# Patient Record
Sex: FEMALE | Race: WHITE | NOT HISPANIC OR LATINO | Employment: OTHER | URBAN - METROPOLITAN AREA
[De-identification: names, ages, dates, MRNs, and addresses within clinical notes are randomized per-mention and may not be internally consistent; named-entity substitution may affect disease eponyms.]

---

## 2022-05-19 ENCOUNTER — OFFICE VISIT (OUTPATIENT)
Dept: ONCOLOGY | Facility: CLINIC | Age: 82
End: 2022-05-19
Payer: MEDICARE

## 2022-05-19 ENCOUNTER — OFFICE VISIT (OUTPATIENT)
Dept: ONCOLOGY | Facility: CLINIC | Age: 82
End: 2022-05-19

## 2022-05-19 VITALS
HEIGHT: 66 IN | DIASTOLIC BLOOD PRESSURE: 63 MMHG | HEART RATE: 80 BPM | HEART RATE: 80 BPM | TEMPERATURE: 99 F | TEMPERATURE: 99 F | SYSTOLIC BLOOD PRESSURE: 135 MMHG | RESPIRATION RATE: 18 BRPM | TEMPERATURE: 99 F | DIASTOLIC BLOOD PRESSURE: 63 MMHG | DIASTOLIC BLOOD PRESSURE: 63 MMHG | BODY MASS INDEX: 27.8 KG/M2 | HEIGHT: 66 IN | SYSTOLIC BLOOD PRESSURE: 135 MMHG | WEIGHT: 173 LBS | BODY MASS INDEX: 27.8 KG/M2 | SYSTOLIC BLOOD PRESSURE: 135 MMHG | WEIGHT: 173 LBS | HEART RATE: 80 BPM | RESPIRATION RATE: 18 BRPM | RESPIRATION RATE: 18 BRPM | BODY MASS INDEX: 27.8 KG/M2 | HEIGHT: 66 IN | WEIGHT: 173 LBS

## 2022-05-19 DIAGNOSIS — M79.621 PAIN OF RIGHT UPPER ARM: ICD-10-CM

## 2022-05-19 DIAGNOSIS — Z17.1 MALIGNANT NEOPLASM OF CENTRAL PORTION OF LEFT BREAST IN FEMALE, ESTROGEN RECEPTOR NEGATIVE: Primary | ICD-10-CM

## 2022-05-19 DIAGNOSIS — Z17.1 MALIGNANT NEOPLASM OF OVERLAPPING SITES OF LEFT BREAST IN FEMALE, ESTROGEN RECEPTOR NEGATIVE: ICD-10-CM

## 2022-05-19 DIAGNOSIS — C50.112 MALIGNANT NEOPLASM OF CENTRAL PORTION OF LEFT BREAST IN FEMALE, ESTROGEN RECEPTOR NEGATIVE: Primary | ICD-10-CM

## 2022-05-19 DIAGNOSIS — C50.812 MALIGNANT NEOPLASM OF OVERLAPPING SITES OF LEFT BREAST IN FEMALE, ESTROGEN RECEPTOR NEGATIVE: ICD-10-CM

## 2022-05-19 DIAGNOSIS — M25.511 CHRONIC RIGHT SHOULDER PAIN: Primary | ICD-10-CM

## 2022-05-19 DIAGNOSIS — M25.611 DECREASED RANGE OF MOTION OF RIGHT SHOULDER: ICD-10-CM

## 2022-05-19 DIAGNOSIS — G89.29 CHRONIC RIGHT SHOULDER PAIN: Primary | ICD-10-CM

## 2022-05-19 PROCEDURE — 99205 OFFICE O/P NEW HI 60 MIN: CPT | Mod: S$GLB,,, | Performed by: RADIOLOGY

## 2022-05-19 PROCEDURE — 3078F DIAST BP <80 MM HG: CPT | Mod: CPTII,S$GLB,, | Performed by: RADIOLOGY

## 2022-05-19 PROCEDURE — 1160F RVW MEDS BY RX/DR IN RCRD: CPT | Mod: CPTII,,, | Performed by: SURGERY

## 2022-05-19 PROCEDURE — 3075F PR MOST RECENT SYSTOLIC BLOOD PRESS GE 130-139MM HG: ICD-10-PCS | Mod: CPTII,,, | Performed by: SURGERY

## 2022-05-19 PROCEDURE — 3288F FALL RISK ASSESSMENT DOCD: CPT | Mod: CPTII,S$GLB,, | Performed by: INTERNAL MEDICINE

## 2022-05-19 PROCEDURE — 99205 PR OFFICE/OUTPT VISIT, NEW, LEVL V, 60-74 MIN: ICD-10-PCS | Mod: S$GLB,,, | Performed by: RADIOLOGY

## 2022-05-19 PROCEDURE — 3288F PR FALLS RISK ASSESSMENT DOCUMENTED: ICD-10-PCS | Mod: CPTII,,, | Performed by: SURGERY

## 2022-05-19 PROCEDURE — 1159F PR MEDICATION LIST DOCUMENTED IN MEDICAL RECORD: ICD-10-PCS | Mod: CPTII,,, | Performed by: SURGERY

## 2022-05-19 PROCEDURE — 1126F AMNT PAIN NOTED NONE PRSNT: CPT | Mod: CPTII,S$GLB,, | Performed by: INTERNAL MEDICINE

## 2022-05-19 PROCEDURE — 3288F FALL RISK ASSESSMENT DOCD: CPT | Mod: CPTII,,, | Performed by: SURGERY

## 2022-05-19 PROCEDURE — 1126F PR PAIN SEVERITY QUANTIFIED, NO PAIN PRESENT: ICD-10-PCS | Mod: CPTII,S$GLB,, | Performed by: RADIOLOGY

## 2022-05-19 PROCEDURE — 3075F PR MOST RECENT SYSTOLIC BLOOD PRESS GE 130-139MM HG: ICD-10-PCS | Mod: CPTII,S$GLB,, | Performed by: INTERNAL MEDICINE

## 2022-05-19 PROCEDURE — 3078F PR MOST RECENT DIASTOLIC BLOOD PRESSURE < 80 MM HG: ICD-10-PCS | Mod: CPTII,S$GLB,, | Performed by: INTERNAL MEDICINE

## 2022-05-19 PROCEDURE — 1101F PR PT FALLS ASSESS DOC 0-1 FALLS W/OUT INJ PAST YR: ICD-10-PCS | Mod: CPTII,S$GLB,, | Performed by: RADIOLOGY

## 2022-05-19 PROCEDURE — 1126F AMNT PAIN NOTED NONE PRSNT: CPT | Mod: CPTII,,, | Performed by: SURGERY

## 2022-05-19 PROCEDURE — 1101F PR PT FALLS ASSESS DOC 0-1 FALLS W/OUT INJ PAST YR: ICD-10-PCS | Mod: CPTII,S$GLB,, | Performed by: INTERNAL MEDICINE

## 2022-05-19 PROCEDURE — 1126F PR PAIN SEVERITY QUANTIFIED, NO PAIN PRESENT: ICD-10-PCS | Mod: CPTII,,, | Performed by: SURGERY

## 2022-05-19 PROCEDURE — 99204 OFFICE O/P NEW MOD 45 MIN: CPT | Mod: ,,, | Performed by: SURGERY

## 2022-05-19 PROCEDURE — 1101F PT FALLS ASSESS-DOCD LE1/YR: CPT | Mod: CPTII,S$GLB,, | Performed by: RADIOLOGY

## 2022-05-19 PROCEDURE — 3078F PR MOST RECENT DIASTOLIC BLOOD PRESSURE < 80 MM HG: ICD-10-PCS | Mod: CPTII,,, | Performed by: SURGERY

## 2022-05-19 PROCEDURE — 99204 PR OFFICE/OUTPT VISIT, NEW, LEVL IV, 45-59 MIN: ICD-10-PCS | Mod: ,,, | Performed by: SURGERY

## 2022-05-19 PROCEDURE — 3075F SYST BP GE 130 - 139MM HG: CPT | Mod: CPTII,S$GLB,, | Performed by: RADIOLOGY

## 2022-05-19 PROCEDURE — 1160F PR REVIEW ALL MEDS BY PRESCRIBER/CLIN PHARMACIST DOCUMENTED: ICD-10-PCS | Mod: CPTII,,, | Performed by: SURGERY

## 2022-05-19 PROCEDURE — 1101F PT FALLS ASSESS-DOCD LE1/YR: CPT | Mod: CPTII,S$GLB,, | Performed by: INTERNAL MEDICINE

## 2022-05-19 PROCEDURE — 3288F PR FALLS RISK ASSESSMENT DOCUMENTED: ICD-10-PCS | Mod: CPTII,S$GLB,, | Performed by: INTERNAL MEDICINE

## 2022-05-19 PROCEDURE — 3078F PR MOST RECENT DIASTOLIC BLOOD PRESSURE < 80 MM HG: ICD-10-PCS | Mod: CPTII,S$GLB,, | Performed by: RADIOLOGY

## 2022-05-19 PROCEDURE — 3075F SYST BP GE 130 - 139MM HG: CPT | Mod: CPTII,,, | Performed by: SURGERY

## 2022-05-19 PROCEDURE — 1126F AMNT PAIN NOTED NONE PRSNT: CPT | Mod: CPTII,S$GLB,, | Performed by: RADIOLOGY

## 2022-05-19 PROCEDURE — 3288F FALL RISK ASSESSMENT DOCD: CPT | Mod: CPTII,S$GLB,, | Performed by: RADIOLOGY

## 2022-05-19 PROCEDURE — 1101F PR PT FALLS ASSESS DOC 0-1 FALLS W/OUT INJ PAST YR: ICD-10-PCS | Mod: CPTII,,, | Performed by: SURGERY

## 2022-05-19 PROCEDURE — 1159F MED LIST DOCD IN RCRD: CPT | Mod: CPTII,,, | Performed by: SURGERY

## 2022-05-19 PROCEDURE — 3078F DIAST BP <80 MM HG: CPT | Mod: CPTII,,, | Performed by: SURGERY

## 2022-05-19 PROCEDURE — 3075F PR MOST RECENT SYSTOLIC BLOOD PRESS GE 130-139MM HG: ICD-10-PCS | Mod: CPTII,S$GLB,, | Performed by: RADIOLOGY

## 2022-05-19 PROCEDURE — 99204 OFFICE O/P NEW MOD 45 MIN: CPT | Mod: S$GLB,,, | Performed by: INTERNAL MEDICINE

## 2022-05-19 PROCEDURE — 3078F DIAST BP <80 MM HG: CPT | Mod: CPTII,S$GLB,, | Performed by: INTERNAL MEDICINE

## 2022-05-19 PROCEDURE — 3288F PR FALLS RISK ASSESSMENT DOCUMENTED: ICD-10-PCS | Mod: CPTII,S$GLB,, | Performed by: RADIOLOGY

## 2022-05-19 PROCEDURE — 99204 PR OFFICE/OUTPT VISIT, NEW, LEVL IV, 45-59 MIN: ICD-10-PCS | Mod: S$GLB,,, | Performed by: INTERNAL MEDICINE

## 2022-05-19 PROCEDURE — 1101F PT FALLS ASSESS-DOCD LE1/YR: CPT | Mod: CPTII,,, | Performed by: SURGERY

## 2022-05-19 PROCEDURE — 3075F SYST BP GE 130 - 139MM HG: CPT | Mod: CPTII,S$GLB,, | Performed by: INTERNAL MEDICINE

## 2022-05-19 PROCEDURE — 1126F PR PAIN SEVERITY QUANTIFIED, NO PAIN PRESENT: ICD-10-PCS | Mod: CPTII,S$GLB,, | Performed by: INTERNAL MEDICINE

## 2022-05-19 RX ORDER — FUROSEMIDE 40 MG/1
40 TABLET ORAL DAILY
COMMUNITY
End: 2024-02-03

## 2022-05-19 RX ORDER — TRAMADOL HYDROCHLORIDE 50 MG/1
50 TABLET ORAL DAILY
COMMUNITY
End: 2024-02-03

## 2022-05-19 RX ORDER — OLMESARTAN MEDOXOMIL, AMLODIPINE AND HYDROCHLOROTHIAZIDE TABLET 40/5/25 MG 40; 5; 25 MG/1; MG/1; MG/1
1 TABLET ORAL DAILY
COMMUNITY
End: 2023-10-24

## 2022-05-19 RX ORDER — ONDANSETRON 4 MG/1
4 TABLET, FILM COATED ORAL ONCE
COMMUNITY
End: 2024-02-03

## 2022-05-19 NOTE — PROGRESS NOTES
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   She reports today to [facility] comprehensive breast multidisciplinary clinc for eval and discussion/planning w/ surgery, MedOnc, and RadOnc. She endorses an uncomplicated w/u thus far, and denies any history of breast erythema, tenderness, swelling, or nipple/skin changes or bleeding/drainage.      Screening MMG (4/12/22):          L central breast CNBx (5/3/22):            REVIEW OF SYSTEMS:  A complete ROS was performed and the patient denies any acute changes/concerns other than per HPI.    No past medical history on file.  No past surgical history on file.  Social History     Socioeconomic History    Marital status: Single     No family history on file.    PRIOR HISTORY OF CHEMOTHERAPY OR RADIOTHERAPY: Please see HPI for patients prior oncologic history.      Review of patient's allergies indicates:  Not on File    QUALITY OF LIFE: 90%- Able to Carry on Normal Activity: Minor Symptoms of Disease    There were no vitals filed for this visit.  There is no height or weight on file to calculate BMI.    PHYSICAL EXAM:  GENERAL: alert; in no apparent distress.   HEAD: normocephalic, atraumatic.  EYES: pupils are equal, round, reactive to light and accommodation. Sclera anicteric. Conjunctiva not injected.   NOSE/THROAT: no nasal erythema or rhinorrhea. Oropharynx pink, without erythema, ulcerations or thrush.   NECK: no cervical motion rigidity; supple with no masses.  CHEST: clear to auscultation bilaterally; no wheezes, crackles or rubs. Patient is speaking  comfortably on room air with normal work of breathing without using accessory muscles of respiration.  CARDIOVASCULAR: regular rate and rhythm; no murmurs, rubs or gallops.  ABDOMEN: soft, nontender, nondistended. Bowel sounds present.   MUSCULOSKELETAL: no tenderness to palpation along the spine or scapulae. Normal range of motion.  NEUROLOGIC: cranial nerves II-XII intact bilaterally. Strength 5/5 in bilateral upper and lower extremities. No sensory deficits appreciated. Reflexes globally intact. No cerebellar signs. Normal gait.  LYMPHATIC: no cervical, supraclavicular or axillary adenopathy appreciated bilaterally.   EXTREMITIES: no clubbing, cyanosis, edema.  SKIN: no erythema, rashes, ulcerations noted.   BREAST:  The right breast has been removed surgically and is well healed. The left breast bx site appeared well healed w/o inflammation or significant seroma.  Palpation revealed soft, pliable tissue of bilateral chest without any discrete lesions or masses. The right and left axillae did not exhibit any evidence of lymphadenopathy. No nipple retraction/inversion or discharge appreciated.    REVIEW OF IMAGING/PATHOLOGY/LABS: Please see HPI. All images reviewed personally by dictating physician.       ASSESSMENT: Rosa Aceves is a 81 y.o. 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    PLAN:  After review of the patient's hx/records, I spent over one hour in consultation with the patient and her daughter discussing the rationales, risks, benefits, and alternatives to WBRT/APBI/PMRT in several possible settings, as well as the potential toxicities, including but not limited to fatigue, skin irritation/erythema/desquamation, late breast/skin scarring and increased density w/ possible telangiectasias and breast contracture, pain, lymphedema, increased lifetime risk of CAD and cardiac events, pneumonitis and pulmonary fibrosis causing cough, SOB/FIGUEREDO, and/or  chronic decline in pulmonary function, increased risk of rib fxr, and secondary malignancy.  I carefully explained the process of simulation and treatment delivery with weekly physician visits.     We then discussed in detail several potential options and outcomes for her care upon completed w/u, including rationales and considerations for the use or not of RT to optimize outcomes in the settings of BCS and mastectomy surgical approaches; wherein adj RT would be standardly recommended as part of BCT, but adj RT indications s/p mastectomy would depend on her surgical pathology findings, including but not limited to absolute indications of high-risk, such as pT3 or N(+) disease and/or (+)SM, as well as relative/cumulative indications, such as premenopausal status, close SM (<1-2mm), inner/medial quadrant tumor location, (+)LVSI, and residual malignancy after neoadj chemotherapy.     Despite our discussion today re: substantial morbidity/mortality risks at her age w/ major surgery, anesthesia, and healing, the patient is opposed to BCT and demands L-TM; and [facility] over neoadj chemo. Thus I explained that she will schedule this soon with Dr. Jim, and I will review her pathology to determine if PMRT is indicated.  She understands that [facility], re: her potential timing and indications, or lack thereof, for adjuvant chemotherapy, all as part of her multidisciplinary careplan.     The patient verbalized understanding of the above discussion, and her questions were answered fully and appropriately to her liking.  We will plan for her to RTC as indicated or requested for further discussion and planning of her care in coordination with her surgical and MedOnc teams. Contact information was exchanged, and the patient was advised to contact the clinic with any questions or concerns.    DISPOSITION: RTC prn    I have personally seen and evaluated this patient.  Greater than 50% of this time was spent discussing coordination of care and/or counseling.    PHYSICIAN: Caio Zarate III, MD    Thank you for the opportunity to meet and consult with Rosa Aceves.   Please feel free to contact me to discuss the above recommendation further.

## 2022-05-20 DIAGNOSIS — Z17.1 MALIGNANT NEOPLASM OF CENTRAL PORTION OF LEFT BREAST IN FEMALE, ESTROGEN RECEPTOR NEGATIVE: Primary | ICD-10-CM

## 2022-05-20 DIAGNOSIS — C50.112 MALIGNANT NEOPLASM OF CENTRAL PORTION OF LEFT BREAST, ESTROGEN RECEPTOR NEGATIVE: ICD-10-CM

## 2022-05-20 DIAGNOSIS — Z17.1 MALIGNANT NEOPLASM OF CENTRAL PORTION OF LEFT BREAST, ESTROGEN RECEPTOR NEGATIVE: ICD-10-CM

## 2022-05-20 DIAGNOSIS — C50.112 MALIGNANT NEOPLASM OF CENTRAL PORTION OF LEFT BREAST IN FEMALE, ESTROGEN RECEPTOR NEGATIVE: Primary | ICD-10-CM

## 2022-05-20 RX ORDER — SODIUM CHLORIDE 9 MG/ML
INJECTION, SOLUTION INTRAVENOUS CONTINUOUS
Status: CANCELLED | OUTPATIENT
Start: 2022-05-26

## 2022-05-20 NOTE — PROGRESS NOTES
[facility] In Office Hematology Oncology Initial Encounter Note    5/19/22    Subjective:      Patient ID:   Rosa Aceves  81 y.o. female  1940  [place]      Chief Complaint:   L breast cancer    HPI:  81 y.o. female had R breast cancer in 1996, treated with R MRM at [facility] in [place], [place].  She had adjuvant chemotherapy, no adjuvant Rad Rx.  Not clear, if she had adjuvant Tamoxifen.    Now with abnormal L mammogram.  1.5 cm mass 12 o'clock posteriorly at L breast.  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  Triple negative for ERP, PRP, H2N.    C/O decreased ROM and pain at R shoulder and arm.  X rays NL.  Discussed MRI of R shoulder or Bone Scan or PET scan to check bone, shoulder sx.      ROS:   GEN: normal without any fever, night sweats or weight loss  HEENT: normal with no HA's, sore throat, stiff neck, changes in vision  CV: normal with no CP, SOB, PND, FIGUEREDO or orthopnea  PULM: normal with no SOB, cough, hemoptysis, sputum or pleuritic pain  GI: normal with no abdominal pain, nausea, vomiting, constipation, diarrhea, melanotic stools, BRBPR, or hematemesis  : normal with no hematuria, dysuria  BREAST: See HPI  SKIN: normal with no rash, erythema, bruising, or swelling     Past Medical History:   Diagnosis Date    Aortic valve regurgitation     Benign paroxysmal vertigo, bilateral     Chronic kidney disease, unspecified     Coronary artery disease     Essential (primary) hypertension     Malignant neoplasm of right breast     Mitral valve regurgitation     Obesity, unspecified     Osteopenia     Positive colorectal cancer screening using Cologuard test     Rheumatoid arthritis, unspecified     Ruptured lumbar disc     Unspecified cataract      Past Surgical History:   Procedure Laterality Date    BREAST BIOPSY Left     BREAST SURGERY Left     CHOLECYSTECTOMY      EYE SURGERY      MASTECTOMY, RADICAL Right     TUBAL LIGATION    "      Review of patient's allergies indicates:  No Known Allergies  Social History     Socioeconomic History    Marital status:     Number of children: 5   Tobacco Use    Smoking status: Never Smoker    Smokeless tobacco: Never Used   Substance and Sexual Activity    Alcohol use: Never    Drug use: Never    Sexual activity: Not Currently     Partners: Male         Current Outpatient Medications:     furosemide (LASIX) 40 MG tablet, Take 40 mg by mouth once daily., Disp: , Rfl:     olmesartan-amLODIPin-hcthiazid 40-5-25 mg Tab, Take 1 tablet by mouth Daily., Disp: , Rfl:     ondansetron (ZOFRAN) 4 MG tablet, Take 4 mg by mouth once., Disp: , Rfl:     traMADoL (ULTRAM) 50 mg tablet, Take 50 mg by mouth once daily., Disp: , Rfl:           Objective:   Vitals:  Blood pressure 135/63, pulse 80, temperature 98.7 °F (37.1 °C), resp. rate 18, height 5' 5.5" (1.664 m), weight 78.5 kg (173 lb).    Physical Examination:   GEN: no apparent distress, comfortable  HEAD: atraumatic and normocephalic  EYES: no pallor, no icterus  ENT:  no pharyngeal erythema, external ears WNL; no nasal discharge  NECK: no masses, thyroid normal, trachea midline, no LAD/LN's, supple  CV: RRR with no murmur; normal pulse; normal S1 and S2; no pedal edema  CHEST: Normal respiratory effort; CTAB; normal breath sounds; no wheeze or crackles  ABDOM: nontender and nondistended; soft;  no rebound/guarding, L/S NP  MUSC/Skeletal: ROM normal; no crepitus; joints normal  EXTREM: no clubbing, cyanosis, inflammation or swelling  SKIN: no rashes, lesions, ulcers, petechiae   : no cvat  NEURO: grossly intact; motor/sensory WNL;  no tremors  PSYCH: normal mood, affect and behavior  LYMPH: normal cervical, supraclavicular, axillary and LN's  BREAST: L breast NT, no D/C and no palpable mass  R chest wall NT, postop changes, no palpable mass  She has decreased ROM at R shoulder and arm.    CBC, CMP, TSH unremarkable    Assessment:   (1) 81 y.o. " female with diagnosis of invasive ductal Ca with lobular features at 12 o'clock at L breast.  1.5 cm, Triple negative for ERP, PRP, H2N.    (2)Hx of R breast cancer 25 years ago.  R MRM, adjuvant chemotherapy,adjuvant tamoxifen?    (3)Discussed neoadjuvant chemotherapy with Carbo, taxotere or CTX, taxotere, or AC q 3 weeks x's 4-6 cycles.  Followed by L mastectomy and SNBx and adjuvant Rad Rx.  Or L mastectomy, SNBx and no Rad Rx necessary.    (3)Because of age, above medical problems, frail appearance; I am hesitant to go with neoadjuvant chemo initially,  She may not have the stamina for later surgery.    (4)I would go with surgery initially,and then after she recovers, will reconsider her later for adjuvant Rx.?    (5)She wants mastectomy and SNBx, with out Rad Rx.    Discussed with Shree Zarate and Aleah.    Proceed with L Mastectomy, SNBx.    (6)Eval of decreased ROM and pain at R shoulder.  Check MRI of shoulder at .    RTC 4 weeks.

## 2022-05-20 NOTE — PROGRESS NOTES
Subjective:       Patient ID: Rosa Aceves is a 81 y.o. female.    Chief Complaint: No chief complaint on file.      HPI 11-year-old female with recently diagnosed invasive ductal carcinoma of the left left breast in the upper outer quadrant.  This was identified on screening mammogram.  Patient has a history of right breast cancer for which she underwent a modified radical mastectomy 25-30 years ago.  She has done well since that time.  She does have some breast tenderness but no other complaints.  There is no nipple discharge.  Family history is significant for a grandmother mother and granddaughter positive for breast cancer.  The granddaughter has had genetic testing and is BRCA negative.  She is seen today in the Comprehensive multidisciplinary breast clinic in conjunction with Oncology and Radiation Oncology.    Past Medical History:   Diagnosis Date    Aortic valve regurgitation     Benign paroxysmal vertigo, bilateral     Chronic kidney disease, unspecified     Coronary artery disease     Essential (primary) hypertension     Malignant neoplasm of right breast     Mitral valve regurgitation     Obesity, unspecified     Osteopenia     Positive colorectal cancer screening using Cologuard test     Rheumatoid arthritis, unspecified     Ruptured lumbar disc     Unspecified cataract      Past Surgical History:   Procedure Laterality Date    BREAST BIOPSY Left     BREAST SURGERY Left     CHOLECYSTECTOMY      EYE SURGERY      MASTECTOMY, RADICAL Right     TUBAL LIGATION           Current Outpatient Medications:     furosemide (LASIX) 40 MG tablet, Take 40 mg by mouth once daily., Disp: , Rfl:     olmesartan-amLODIPin-hcthiazid 40-5-25 mg Tab, Take 1 tablet by mouth Daily., Disp: , Rfl:     ondansetron (ZOFRAN) 4 MG tablet, Take 4 mg by mouth once., Disp: , Rfl:     traMADoL (ULTRAM) 50 mg tablet, Take 50 mg by mouth once daily., Disp: , Rfl:     Review of patient's allergies indicates:  No  Known Allergies    Family History   Problem Relation Age of Onset    Cancer Mother     Heart failure Father     Cancer Sister     Cancer Grandchild      Social History     Socioeconomic History    Marital status:     Number of children: 5   Tobacco Use    Smoking status: Never Smoker    Smokeless tobacco: Never Used   Substance and Sexual Activity    Alcohol use: Never    Drug use: Never    Sexual activity: Not Currently     Partners: Male       Review of Systems   HENT: Negative.    Respiratory: Negative.    Cardiovascular: Negative.    Gastrointestinal: Negative.      Objective:     Physical Exam  Constitutional:       General: She is not in acute distress.     Appearance: She is well-developed.   HENT:      Head: Normocephalic and atraumatic.   Eyes:      General: No scleral icterus.     Conjunctiva/sclera: Conjunctivae normal.      Pupils: Pupils are equal, round, and reactive to light.   Neck:      Thyroid: No thyromegaly.   Cardiovascular:      Rate and Rhythm: Normal rate and regular rhythm.      Heart sounds: No murmur heard.  Pulmonary:      Effort: Pulmonary effort is normal.      Breath sounds: Normal breath sounds. No wheezing or rales.      Comments: Left Breast Exam:  There are no palpable dominant masses.  There are no overlying skin changes.  There is no contour change of the breast.  There is no nipple discharge.  There is upper breast tenderness.  There is no palpable axillary or supraclavicular adenopathy.    She is post right modified radical mastectomy.  There were no complications from that procedure.  She has no lymphedema of the right arm.  Incision is well healed.  Abdominal:      General: Bowel sounds are normal. There is no distension.      Palpations: Abdomen is soft. There is no mass.      Tenderness: There is no abdominal tenderness.      Hernia: No hernia is present.   Musculoskeletal:         General: Normal range of motion.      Cervical back: Normal range of  motion.   Lymphadenopathy:      Cervical: No cervical adenopathy.   Skin:     General: Skin is warm and dry.      Findings: No erythema or rash.   Neurological:      Mental Status: She is alert and oriented to person, place, and time.   Psychiatric:         Behavior: Behavior normal.       Assessment:     Encounter Diagnosis   Name Primary?    Malignant neoplasm of central portion of left breast in female, estrogen receptor negative Yes       Plan:      1. Discussed at length with the patient and several family members her surgical options.  We discussed lumpectomy with axillary sentinel node biopsy versus mastectomy.  The patient has had a right mastectomy in the past and would like to proceed with a left mastectomy at this point for management.  All of her questions were answered.  All of her family members questions were answered.  2. Patient also visited with Oncology and Radiation Oncology.  Her questions were answered here as well.  She still wants to proceed with mastectomy.  3. Risks and benefits of the planned procedure were discussed at length with the patient.  Risks and benefits of not proceeding with the procedure were discussed as well. All questions were answered. The patient expressed clear understanding and would like to proceed with the procedure as discussed.

## 2022-05-24 ENCOUNTER — HOSPITAL ENCOUNTER (OUTPATIENT)
Dept: PREADMISSION TESTING | Facility: HOSPITAL | Age: 82
Discharge: HOME | End: 2022-05-24
Payer: MEDICARE

## 2022-05-24 RX ORDER — IBUPROFEN 200 MG
400 TABLET ORAL
Status: ON HOLD | COMMUNITY
End: 2022-05-27 | Stop reason: HOSPADM

## 2022-05-25 ENCOUNTER — ANESTHESIA EVENT (OUTPATIENT)
Dept: SURGERY | Facility: HOSPITAL | Age: 82
End: 2022-05-25
Payer: MEDICARE

## 2022-05-26 ENCOUNTER — ANESTHESIA (OUTPATIENT)
Dept: SURGERY | Facility: HOSPITAL | Age: 82
End: 2022-05-26
Payer: MEDICARE

## 2022-05-26 ENCOUNTER — HOSPITAL ENCOUNTER (OUTPATIENT)
Facility: HOSPITAL | Age: 82
Discharge: HOME | End: 2022-05-27
Attending: SURGERY | Admitting: SURGERY
Payer: MEDICARE

## 2022-05-26 ENCOUNTER — HOSPITAL ENCOUNTER (OUTPATIENT)
Dept: RADIOLOGY | Facility: HOSPITAL | Age: 82
Discharge: HOME | End: 2022-05-26
Attending: SURGERY
Payer: MEDICARE

## 2022-05-26 DIAGNOSIS — C50.112 MALIGNANT NEOPLASM OF CENTRAL PORTION OF LEFT BREAST IN FEMALE, ESTROGEN RECEPTOR NEGATIVE: ICD-10-CM

## 2022-05-26 DIAGNOSIS — Z01.818 PRE-OP TESTING: ICD-10-CM

## 2022-05-26 DIAGNOSIS — Z17.1 MALIGNANT NEOPLASM OF CENTRAL PORTION OF LEFT BREAST IN FEMALE, ESTROGEN RECEPTOR NEGATIVE: ICD-10-CM

## 2022-05-26 DIAGNOSIS — C50.112 MALIGNANT NEOPLASM OF CENTRAL PORTION OF LEFT BREAST, ESTROGEN RECEPTOR NEGATIVE: Primary | ICD-10-CM

## 2022-05-26 DIAGNOSIS — Z17.1 MALIGNANT NEOPLASM OF CENTRAL PORTION OF LEFT BREAST, ESTROGEN RECEPTOR NEGATIVE: Primary | ICD-10-CM

## 2022-05-26 PROCEDURE — 88342 IMHCHEM/IMCYTCHM 1ST ANTB: CPT | Mod: 26,59,, | Performed by: PATHOLOGY

## 2022-05-26 PROCEDURE — 99900103 DSU ONLY-NO CHARGE-INITIAL HR (STAT)

## 2022-05-26 PROCEDURE — 38792 RA TRACER ID OF SENTINL NODE: CPT | Mod: TC,LT

## 2022-05-26 PROCEDURE — 25000003 PHARM REV CODE 250: Performed by: SURGERY

## 2022-05-26 PROCEDURE — 71000033 HC RECOVERY, INTIAL HOUR: Performed by: SURGERY

## 2022-05-26 PROCEDURE — 88341 IMHCHEM/IMCYTCHM EA ADD ANTB: CPT | Mod: 59 | Performed by: PATHOLOGY

## 2022-05-26 PROCEDURE — 88342 CHG IMMUNOCYTOCHEMISTRY: ICD-10-PCS | Mod: 26,59,, | Performed by: PATHOLOGY

## 2022-05-26 PROCEDURE — 88331 PATH CONSLTJ SURG 1 BLK 1SPC: CPT | Performed by: PATHOLOGY

## 2022-05-26 PROCEDURE — 25000003 PHARM REV CODE 250: Performed by: ANESTHESIOLOGY

## 2022-05-26 PROCEDURE — 88360 TUMOR IMMUNOHISTOCHEM/MANUAL: CPT | Mod: 26,,, | Performed by: PATHOLOGY

## 2022-05-26 PROCEDURE — 37000009 HC ANESTHESIA EA ADD 15 MINS: Performed by: SURGERY

## 2022-05-26 PROCEDURE — 38792 NM SENTINEL LYMPH NODE INJECTION ONLY_RAD PERFORMED: ICD-10-PCS | Mod: LT,,, | Performed by: RADIOLOGY

## 2022-05-26 PROCEDURE — 38525 PR BIOPSY/REM LYMPH NODES, AXILLARY: ICD-10-PCS | Mod: 51,LT,, | Performed by: SURGERY

## 2022-05-26 PROCEDURE — 19303 MAST SIMPLE COMPLETE: CPT | Mod: LT,,, | Performed by: SURGERY

## 2022-05-26 PROCEDURE — 37000008 HC ANESTHESIA 1ST 15 MINUTES: Performed by: SURGERY

## 2022-05-26 PROCEDURE — D9220A PRA ANESTHESIA: ICD-10-PCS | Mod: ANES,,, | Performed by: ANESTHESIOLOGY

## 2022-05-26 PROCEDURE — 88307 TISSUE EXAM BY PATHOLOGIST: CPT | Mod: 59 | Performed by: PATHOLOGY

## 2022-05-26 PROCEDURE — 88307 TISSUE EXAM BY PATHOLOGIST: CPT | Mod: 26,,, | Performed by: PATHOLOGY

## 2022-05-26 PROCEDURE — D9220A PRA ANESTHESIA: Mod: CRNA,,, | Performed by: NURSE ANESTHETIST, CERTIFIED REGISTERED

## 2022-05-26 PROCEDURE — 38792 RA TRACER ID OF SENTINL NODE: CPT | Mod: LT,,, | Performed by: RADIOLOGY

## 2022-05-26 PROCEDURE — 63600175 PHARM REV CODE 636 W HCPCS: Performed by: NURSE ANESTHETIST, CERTIFIED REGISTERED

## 2022-05-26 PROCEDURE — 63600175 PHARM REV CODE 636 W HCPCS: Performed by: ANESTHESIOLOGY

## 2022-05-26 PROCEDURE — 27200651 HC AIRWAY, LMA: Performed by: ANESTHESIOLOGY

## 2022-05-26 PROCEDURE — 99900104 DSU ONLY-NO CHARGE-EA ADD'L HR (STAT): Performed by: SURGERY

## 2022-05-26 PROCEDURE — 93005 ELECTROCARDIOGRAM TRACING: CPT

## 2022-05-26 PROCEDURE — 88360 TUMOR IMMUNOHISTOCHEM/MANUAL: CPT | Performed by: PATHOLOGY

## 2022-05-26 PROCEDURE — 88360 PR  TUMOR IMMUNOHISTOCHEM/MANUAL: ICD-10-PCS | Mod: 26,,, | Performed by: PATHOLOGY

## 2022-05-26 PROCEDURE — 88331 PR  PATH CONSULT IN SURG,W FRZ SEC: ICD-10-PCS | Mod: 26,,, | Performed by: PATHOLOGY

## 2022-05-26 PROCEDURE — 99900035 HC TECH TIME PER 15 MIN (STAT)

## 2022-05-26 PROCEDURE — C1729 CATH, DRAINAGE: HCPCS | Performed by: SURGERY

## 2022-05-26 PROCEDURE — 36000706: Performed by: SURGERY

## 2022-05-26 PROCEDURE — 63600175 PHARM REV CODE 636 W HCPCS: Performed by: SURGERY

## 2022-05-26 PROCEDURE — 36000707: Performed by: SURGERY

## 2022-05-26 PROCEDURE — 99900103 DSU ONLY-NO CHARGE-INITIAL HR (STAT): Performed by: SURGERY

## 2022-05-26 PROCEDURE — 71000039 HC RECOVERY, EACH ADD'L HOUR: Performed by: SURGERY

## 2022-05-26 PROCEDURE — D9220A PRA ANESTHESIA: ICD-10-PCS | Mod: CRNA,,, | Performed by: NURSE ANESTHETIST, CERTIFIED REGISTERED

## 2022-05-26 PROCEDURE — 27201423 OPTIME MED/SURG SUP & DEVICES STERILE SUPPLY: Performed by: SURGERY

## 2022-05-26 PROCEDURE — 93010 EKG 12-LEAD: ICD-10-PCS | Mod: ,,, | Performed by: SPECIALIST

## 2022-05-26 PROCEDURE — 88307 PR  SURG PATH,LEVEL V: ICD-10-PCS | Mod: 26,,, | Performed by: PATHOLOGY

## 2022-05-26 PROCEDURE — 88342 IMHCHEM/IMCYTCHM 1ST ANTB: CPT | Performed by: PATHOLOGY

## 2022-05-26 PROCEDURE — 88341 PR IHC OR ICC EACH ADD'L SINGLE ANTIBODY  STAINPR: ICD-10-PCS | Mod: 26,59,, | Performed by: PATHOLOGY

## 2022-05-26 PROCEDURE — D9220A PRA ANESTHESIA: Mod: ANES,,, | Performed by: ANESTHESIOLOGY

## 2022-05-26 PROCEDURE — 38525 BIOPSY/REMOVAL LYMPH NODES: CPT | Mod: 51,LT,, | Performed by: SURGERY

## 2022-05-26 PROCEDURE — 88341 IMHCHEM/IMCYTCHM EA ADD ANTB: CPT | Mod: 26,59,, | Performed by: PATHOLOGY

## 2022-05-26 PROCEDURE — 88331 PATH CONSLTJ SURG 1 BLK 1SPC: CPT | Mod: 26,,, | Performed by: PATHOLOGY

## 2022-05-26 PROCEDURE — 19303 PR MASTECTOMY, SIMPLE, COMPLETE: ICD-10-PCS | Mod: LT,,, | Performed by: SURGERY

## 2022-05-26 PROCEDURE — 93010 ELECTROCARDIOGRAM REPORT: CPT | Mod: ,,, | Performed by: SPECIALIST

## 2022-05-26 RX ORDER — METHYLENE BLUE 10 MG/ML
INJECTION INTRAVENOUS
Status: DISCONTINUED | OUTPATIENT
Start: 2022-05-26 | End: 2022-05-26 | Stop reason: HOSPADM

## 2022-05-26 RX ORDER — FENTANYL CITRATE 50 UG/ML
25 INJECTION, SOLUTION INTRAMUSCULAR; INTRAVENOUS EVERY 5 MIN PRN
Status: COMPLETED | OUTPATIENT
Start: 2022-05-26 | End: 2022-05-26

## 2022-05-26 RX ORDER — FENTANYL CITRATE 50 UG/ML
INJECTION, SOLUTION INTRAMUSCULAR; INTRAVENOUS
Status: DISCONTINUED | OUTPATIENT
Start: 2022-05-26 | End: 2022-05-26

## 2022-05-26 RX ORDER — SODIUM CHLORIDE 0.9 % (FLUSH) 0.9 %
10 SYRINGE (ML) INJECTION
Status: DISCONTINUED | OUTPATIENT
Start: 2022-05-26 | End: 2022-05-27 | Stop reason: HOSPADM

## 2022-05-26 RX ORDER — SODIUM CHLORIDE 9 MG/ML
INJECTION, SOLUTION INTRAVENOUS CONTINUOUS
Status: DISCONTINUED | OUTPATIENT
Start: 2022-05-26 | End: 2022-05-27 | Stop reason: HOSPADM

## 2022-05-26 RX ORDER — BUPIVACAINE HYDROCHLORIDE AND EPINEPHRINE 5; 5 MG/ML; UG/ML
INJECTION, SOLUTION EPIDURAL; INTRACAUDAL; PERINEURAL
Status: DISCONTINUED | OUTPATIENT
Start: 2022-05-26 | End: 2022-05-26 | Stop reason: HOSPADM

## 2022-05-26 RX ORDER — OXYCODONE HYDROCHLORIDE 10 MG/1
10 TABLET ORAL EVERY 4 HOURS PRN
Status: DISCONTINUED | OUTPATIENT
Start: 2022-05-26 | End: 2022-05-27 | Stop reason: HOSPADM

## 2022-05-26 RX ORDER — METOCLOPRAMIDE HYDROCHLORIDE 5 MG/ML
10 INJECTION INTRAMUSCULAR; INTRAVENOUS EVERY 10 MIN PRN
Status: DISCONTINUED | OUTPATIENT
Start: 2022-05-26 | End: 2022-05-26

## 2022-05-26 RX ORDER — OXYCODONE HYDROCHLORIDE 5 MG/1
5 TABLET ORAL
Status: DISCONTINUED | OUTPATIENT
Start: 2022-05-26 | End: 2022-05-26

## 2022-05-26 RX ORDER — FUROSEMIDE 40 MG/1
40 TABLET ORAL DAILY
Status: DISCONTINUED | OUTPATIENT
Start: 2022-05-27 | End: 2022-05-27 | Stop reason: HOSPADM

## 2022-05-26 RX ORDER — LIDOCAINE HYDROCHLORIDE 10 MG/ML
1 INJECTION, SOLUTION EPIDURAL; INFILTRATION; INTRACAUDAL; PERINEURAL ONCE
Status: COMPLETED | OUTPATIENT
Start: 2022-05-26 | End: 2022-05-26

## 2022-05-26 RX ORDER — OLMESARTAN MEDOXOMIL, AMLODIPINE AND HYDROCHLOROTHIAZIDE TABLET 40/5/25 MG 40; 5; 25 MG/1; MG/1; MG/1
1 TABLET ORAL DAILY
Status: DISCONTINUED | OUTPATIENT
Start: 2022-05-26 | End: 2022-05-27 | Stop reason: HOSPADM

## 2022-05-26 RX ORDER — HYDROCODONE BITARTRATE AND ACETAMINOPHEN 5; 325 MG/1; MG/1
1 TABLET ORAL EVERY 4 HOURS PRN
Status: DISCONTINUED | OUTPATIENT
Start: 2022-05-26 | End: 2022-05-27 | Stop reason: HOSPADM

## 2022-05-26 RX ORDER — SODIUM CHLORIDE 9 MG/ML
INJECTION, SOLUTION INTRAVENOUS CONTINUOUS
Status: DISCONTINUED | OUTPATIENT
Start: 2022-05-26 | End: 2022-05-26

## 2022-05-26 RX ORDER — CEFAZOLIN SODIUM 2 G/50ML
2 SOLUTION INTRAVENOUS
Status: COMPLETED | OUTPATIENT
Start: 2022-05-26 | End: 2022-05-26

## 2022-05-26 RX ORDER — PROPOFOL 10 MG/ML
VIAL (ML) INTRAVENOUS
Status: DISCONTINUED | OUTPATIENT
Start: 2022-05-26 | End: 2022-05-26

## 2022-05-26 RX ORDER — ACETAMINOPHEN 10 MG/ML
INJECTION, SOLUTION INTRAVENOUS
Status: DISCONTINUED | OUTPATIENT
Start: 2022-05-26 | End: 2022-05-26

## 2022-05-26 RX ORDER — ONDANSETRON 2 MG/ML
4 INJECTION INTRAMUSCULAR; INTRAVENOUS EVERY 12 HOURS PRN
Status: DISCONTINUED | OUTPATIENT
Start: 2022-05-26 | End: 2022-05-27 | Stop reason: HOSPADM

## 2022-05-26 RX ORDER — ONDANSETRON HYDROCHLORIDE 2 MG/ML
INJECTION, SOLUTION INTRAMUSCULAR; INTRAVENOUS
Status: DISCONTINUED | OUTPATIENT
Start: 2022-05-26 | End: 2022-05-26

## 2022-05-26 RX ORDER — ENOXAPARIN SODIUM 100 MG/ML
40 INJECTION SUBCUTANEOUS EVERY 24 HOURS
Status: DISCONTINUED | OUTPATIENT
Start: 2022-05-27 | End: 2022-05-27 | Stop reason: HOSPADM

## 2022-05-26 RX ADMIN — SODIUM CHLORIDE, SODIUM GLUCONATE, SODIUM ACETATE, POTASSIUM CHLORIDE, MAGNESIUM CHLORIDE, SODIUM PHOSPHATE, DIBASIC, AND POTASSIUM PHOSPHATE: .53; .5; .37; .037; .03; .012; .00082 INJECTION, SOLUTION INTRAVENOUS at 03:05

## 2022-05-26 RX ADMIN — FENTANYL CITRATE 50 MCG: 50 INJECTION, SOLUTION INTRAMUSCULAR; INTRAVENOUS at 03:05

## 2022-05-26 RX ADMIN — FENTANYL CITRATE 25 MCG: 50 INJECTION INTRAMUSCULAR; INTRAVENOUS at 04:05

## 2022-05-26 RX ADMIN — FENTANYL CITRATE 50 MCG: 50 INJECTION, SOLUTION INTRAMUSCULAR; INTRAVENOUS at 01:05

## 2022-05-26 RX ADMIN — ONDANSETRON 4 MG: 2 INJECTION, SOLUTION INTRAMUSCULAR; INTRAVENOUS at 02:05

## 2022-05-26 RX ADMIN — PROPOFOL 140 MG: 10 INJECTION, EMULSION INTRAVENOUS at 01:05

## 2022-05-26 RX ADMIN — PROPOFOL 20 MG: 10 INJECTION, EMULSION INTRAVENOUS at 01:05

## 2022-05-26 RX ADMIN — HYDROCODONE BITARTRATE AND ACETAMINOPHEN 1 TABLET: 5; 325 TABLET ORAL at 10:05

## 2022-05-26 RX ADMIN — OXYCODONE 5 MG: 5 TABLET ORAL at 03:05

## 2022-05-26 RX ADMIN — FENTANYL CITRATE 50 MCG: 50 INJECTION, SOLUTION INTRAMUSCULAR; INTRAVENOUS at 02:05

## 2022-05-26 RX ADMIN — SODIUM CHLORIDE: 0.9 INJECTION, SOLUTION INTRAVENOUS at 04:05

## 2022-05-26 RX ADMIN — SODIUM CHLORIDE, SODIUM GLUCONATE, SODIUM ACETATE, POTASSIUM CHLORIDE, MAGNESIUM CHLORIDE, SODIUM PHOSPHATE, DIBASIC, AND POTASSIUM PHOSPHATE: .53; .5; .37; .037; .03; .012; .00082 INJECTION, SOLUTION INTRAVENOUS at 12:05

## 2022-05-26 RX ADMIN — ACETAMINOPHEN 1000 MG: 10 INJECTION, SOLUTION INTRAVENOUS at 02:05

## 2022-05-26 RX ADMIN — LIDOCAINE HYDROCHLORIDE 10 MG: 10 INJECTION, SOLUTION EPIDURAL; INFILTRATION; INTRACAUDAL; PERINEURAL at 12:05

## 2022-05-26 RX ADMIN — CEFAZOLIN SODIUM 2 G: 2 SOLUTION INTRAVENOUS at 01:05

## 2022-05-26 NOTE — TRANSFER OF CARE
"Anesthesia Transfer of Care Note    Patient: Rosa Aceves    Procedure(s) Performed: Procedure(s) (LRB):  MASTECTOMY (Left)  BIOPSY, LYMPH NODE, SENTINEL (Left)    Patient location: PACU    Anesthesia Type: general    Transport from OR: Transported from OR on 2-3 L/min O2 by NC with adequate spontaneous ventilation    Post pain: adequate analgesia    Post assessment: no apparent anesthetic complications    Post vital signs: stable    Level of consciousness: awake    Nausea/Vomiting: no nausea/vomiting    Complications: none    Transfer of care protocol was followed      Last vitals:   Visit Vitals  BP (!) 168/72 (BP Location: Left arm, Patient Position: Lying)   Pulse 83   Temp 36.8 °C (98.2 °F) (Skin)   Resp 20   Ht 5' 5.5" (1.664 m)   Wt 78.3 kg (172 lb 8.2 oz)   SpO2 98%   Breastfeeding No   BMI 28.27 kg/m²     "

## 2022-05-26 NOTE — ANESTHESIA POSTPROCEDURE EVALUATION
Anesthesia Post Evaluation    Patient: Rosa Aceves    Procedure(s) Performed: Procedure(s) (LRB):  MASTECTOMY (Left)  BIOPSY, LYMPH NODE, SENTINEL (Left)    Final Anesthesia Type: general      Patient location during evaluation: PACU  Patient participation: Yes- Able to Participate  Level of consciousness: awake and alert and oriented  Post-procedure vital signs: reviewed and stable  Pain management: adequate  Airway patency: patent    PONV status at discharge: No PONV  Anesthetic complications: no      Cardiovascular status: blood pressure returned to baseline and stable  Respiratory status: unassisted and spontaneous ventilation  Hydration status: euvolemic  Follow-up not needed.          Vitals Value Taken Time   /62 05/26/22 1550   Temp   05/26/22 1555   Pulse 88 05/26/22 1555   Resp 24 05/26/22 1555   SpO2 100 % 05/26/22 1555   Vitals shown include unvalidated device data.      No case tracking events are documented in the log.      Pain/Yaneth Score: No data recorded

## 2022-05-26 NOTE — PLAN OF CARE
Released from Pacu when criteria met pain controlled skin w+d No nausea No emesis dsg dry intact aaox4 encouraged deep breaths Pt has all belongings  With family including top dentures and rollator with family and her clothes joann drain x 2 to br #1 put out 25 cc emptied #2 scant amt pure wic in place instr pt not to get oob alarm on family at bedside bed low and locked  dsg dry to r br  Dr Elizabeth made aware of pt location

## 2022-05-26 NOTE — PROGRESS NOTES
Patient received to this nurse room 420 no S/S of distress breathing unlabored. vs's no verbalization of pain or facial grimacing. Pt came up with pure wick void before arrival. Is at bedside. Limb alert to right and left arms. BP on left leg. ScD's in place. Family at bedside. Call light in place and  fall precautions in place

## 2022-05-26 NOTE — OP NOTE
Patient: Rosa Aceves     Date of Procedure: 5/26/2022    Procedure: 1. Left Mastectomy.  2. [place] node injection.  3. Excision of deep axillary sentinel node.    Surgeon: Renzo Bullard MD    Assistant: None    Pre-op Diagnosis: Malignant neoplasm of central portion of left breast in female, estrogen receptor negative [C50.112, Z17.1]     Post-op Diagnosis: Malignant neoplasm of central portion of left breast in female, estrogen receptor negative [C50.112, Z17.1]    Procedure in Detail:  After informed consent was obtained, consent form signed, and questions answered, the left breast was identified and marked.  The patient had radio labeled colloid injected into the subareolar tissue of the breast in Radiology prior to the procedure. The patient was then taken to the operating room where general anesthesia was induced. Prophylactic intravenous antibiotics were administered.  3 cc of methylene blue were injected into the subareolar tissue of the breast to aid in identification of the sentinel node in conjunction with the radio labeled colloid.  The patient was positioned and the surgical field was prepped and draped in the usual sterile fashion. A thorough time-out procedure was performed with the surgical team.    Lines of incision were drawn on the breast to allow for closure of the flaps.  The skin incision was then made and dissection was performed with electrocautery up to a level just inferior to the clavicle superiorly, to the sternum medially, to the inframammary ridge inferiorly, and to the latissimus dorsi muscle laterally. Medium-sized vessels were controlled with clips as necessary.    When the dissection reached the area of the axilla, the Neoprobe and the presence of blue dye were used to identify the sentinel node.  The node was then excised and sent to pathology for frozen section.  According to the pathologist's verbal report, no metastatic disease was identified within the sentinel  node.    The breast was then excised from medial to lateral including the pectoralis fascia. The axillary tail was marked with a silk suture and the specimen was sent to pathology. The area was thoroughly irrigated and suctioned dry. Adequate hemostasis was ensured. 2 PRAVEENA drains  were brought out through an inferior stab incision and secured to the skin with 2 0 nylon suture. The subcutaneous tissues and dermis were reapproximated with interrupted 3 0 Vicryl suture. The skin was then closed with 4 O Monocryl subcuticular stitches. Steri-Strips and dressings were applied and then an Ace wrap was placed.    The patient was awakened, extubated, and transferred to the recovery room in stable condition. There were no immediate complications.    Specimen: 1. Left breast.  2. Left axillary sentinel node.    EBL: 175 cc    Complications: None

## 2022-05-26 NOTE — ANESTHESIA PREPROCEDURE EVALUATION
05/26/2022  Rosa Aceves is a 81 y.o., female.      Pre-op Assessment    I have reviewed the Patient Summary Reports.     I have reviewed the Nursing Notes. I have reviewed the NPO Status.   I have reviewed the Medications.     Review of Systems  Anesthesia Hx:  No problems with previous Anesthesia    Social:  Non-Smoker    Hematology/Oncology:        Current/Recent Cancer. (left breast ) -- Cancer in past history (right breast s/p resection w/nodes):    Cardiovascular:   Hypertension, well controlled Valvular problems/Murmurs (AR, MR) CAD asymptomatic     Pulmonary:  Pulmonary Normal    Renal/:   Chronic Renal Disease    Musculoskeletal:   Arthritis     Neurological:  Neurology Normal    Endocrine:  Endocrine Normal  Obesity / BMI > 30      Physical Exam  General: Well nourished, Cooperative, Alert and Oriented    Airway:  Mallampati: I   Mouth Opening: Normal  Neck ROM: Normal ROM        Anesthesia Plan  Type of Anesthesia, risks & benefits discussed:    Anesthesia Type: Gen ETT, Gen Supraglottic Airway, Gen Natural Airway, MAC  Intra-op Monitoring Plan: Standard ASA Monitors  Post Op Pain Control Plan: multimodal analgesia  Induction:  IV  Airway Plan: Direct, Video and Fiberoptic, Post-Induction  Informed Consent: Informed consent signed with the Patient and all parties understand the risks and agree with anesthesia plan.  All questions answered.   ASA Score: 3  Anesthesia Plan Notes: No IV/BP R arm.  Plan IV to left arm today and will switch if we do nodes intra-op.    Ready For Surgery From Anesthesia Perspective.     .

## 2022-05-26 NOTE — PLAN OF CARE
Problem: Adult Inpatient Plan of Care  Goal: Patient-Specific Goal (Individualized)  Outcome: Ongoing, Progressing   Pt lying in bed no s/s of distress  breathing unlabored vss. Pt verbalized no concerns at this time. Family at bedside. Limb alert bilateral upper extremities. Call light in reach fall precautions in place.

## 2022-05-26 NOTE — ANESTHESIA PROCEDURE NOTES
Intubation    Date/Time: 5/26/2022 3:17 PM  Performed by: Filiberto Pryor CRNA  Authorized by: Timoteo Latham MD     Intubation:     Induction:  Intravenous    Intubated:  Postinduction    Mask Ventilation:  N/a    Attempts:  1    Attempted By:  CRNA    Difficult Airway Encountered?: No      Complications:  None    Airway Device:  Supraglottic airway/LMA    Airway Device Size:  4.0 (could not get a 3 to seal, 4 sealed perfectly)    Style/Cuff Inflation:  Cuffed    Secured at:  The lips    Placement Verified By:  Capnometry    Complicating Factors:  None    Findings Post-Intubation:  BS equal bilateral

## 2022-05-27 ENCOUNTER — TELEPHONE (OUTPATIENT)
Dept: SURGERY | Facility: CLINIC | Age: 82
End: 2022-05-27
Payer: MEDICARE

## 2022-05-27 VITALS
DIASTOLIC BLOOD PRESSURE: 65 MMHG | BODY MASS INDEX: 27.72 KG/M2 | HEART RATE: 71 BPM | WEIGHT: 172.5 LBS | RESPIRATION RATE: 16 BRPM | TEMPERATURE: 98 F | OXYGEN SATURATION: 95 % | SYSTOLIC BLOOD PRESSURE: 139 MMHG | HEIGHT: 66 IN

## 2022-05-27 LAB
ANION GAP SERPL CALC-SCNC: 7 MMOL/L (ref 8–16)
BUN SERPL-MCNC: 28 MG/DL (ref 8–23)
CALCIUM SERPL-MCNC: 8.4 MG/DL (ref 8.7–10.5)
CHLORIDE SERPL-SCNC: 112 MMOL/L (ref 95–110)
CO2 SERPL-SCNC: 23 MMOL/L (ref 23–29)
CREAT SERPL-MCNC: 1.2 MG/DL (ref 0.5–1.4)
ERYTHROCYTE [DISTWIDTH] IN BLOOD BY AUTOMATED COUNT: 12.6 % (ref 11.5–14.5)
EST. GFR  (AFRICAN AMERICAN): 50 ML/MIN/1.73 M^2
EST. GFR  (NON AFRICAN AMERICAN): 43 ML/MIN/1.73 M^2
GLUCOSE SERPL-MCNC: 110 MG/DL (ref 70–110)
HCT VFR BLD AUTO: 31.4 % (ref 37–48.5)
HGB BLD-MCNC: 10 G/DL (ref 12–16)
MCH RBC QN AUTO: 31.6 PG (ref 27–31)
MCHC RBC AUTO-ENTMCNC: 31.8 G/DL (ref 32–36)
MCV RBC AUTO: 99 FL (ref 82–98)
PLATELET # BLD AUTO: 182 K/UL (ref 150–450)
PMV BLD AUTO: 10.8 FL (ref 9.2–12.9)
POTASSIUM SERPL-SCNC: 4.5 MMOL/L (ref 3.5–5.1)
RBC # BLD AUTO: 3.16 M/UL (ref 4–5.4)
SODIUM SERPL-SCNC: 142 MMOL/L (ref 136–145)
WBC # BLD AUTO: 9.1 K/UL (ref 3.9–12.7)

## 2022-05-27 PROCEDURE — 25000003 PHARM REV CODE 250: Performed by: SURGERY

## 2022-05-27 PROCEDURE — 97530 THERAPEUTIC ACTIVITIES: CPT

## 2022-05-27 PROCEDURE — 94799 UNLISTED PULMONARY SVC/PX: CPT

## 2022-05-27 PROCEDURE — 97162 PT EVAL MOD COMPLEX 30 MIN: CPT

## 2022-05-27 PROCEDURE — 80048 BASIC METABOLIC PNL TOTAL CA: CPT | Performed by: SURGERY

## 2022-05-27 PROCEDURE — 85027 COMPLETE CBC AUTOMATED: CPT | Performed by: SURGERY

## 2022-05-27 PROCEDURE — 97116 GAIT TRAINING THERAPY: CPT

## 2022-05-27 PROCEDURE — 36415 COLL VENOUS BLD VENIPUNCTURE: CPT | Performed by: SURGERY

## 2022-05-27 PROCEDURE — 94761 N-INVAS EAR/PLS OXIMETRY MLT: CPT

## 2022-05-27 RX ORDER — HYDROCODONE BITARTRATE AND ACETAMINOPHEN 5; 325 MG/1; MG/1
1 TABLET ORAL EVERY 6 HOURS PRN
Qty: 15 TABLET | Refills: 0 | Status: SHIPPED | OUTPATIENT
Start: 2022-05-27 | End: 2022-06-22

## 2022-05-27 RX ADMIN — HYDROCODONE BITARTRATE AND ACETAMINOPHEN 1 TABLET: 5; 325 TABLET ORAL at 08:05

## 2022-05-27 RX ADMIN — HYDROCODONE BITARTRATE AND ACETAMINOPHEN 1 TABLET: 5; 325 TABLET ORAL at 03:05

## 2022-05-27 RX ADMIN — SODIUM CHLORIDE: 0.9 INJECTION, SOLUTION INTRAVENOUS at 08:05

## 2022-05-27 NOTE — TELEPHONE ENCOUNTER
I called patient and she stated that Dr. Jim wants to see her in the office in 10-14 days.  I'll check with Dr. Jim to ask when he wants to see her back in the office and then call her back.  Darrius

## 2022-05-27 NOTE — PLAN OF CARE
CM received a call from [facility], per Jami, they can accept patient.       05/27/22 1620   Post-Acute Status   Post-Acute Authorization Home Health   Home Health Status Set-up Complete/Auth obtained

## 2022-05-27 NOTE — DISCHARGE SUMMARY
Discharge Summary  General Surgery      Admit Date: 5/26/2022    Discharge Date :5/26/2022    Attending Physician: Renzo Jim     Discharge Physician: Renzo Jim    Discharged Condition: good    Discharge Diagnosis: Malignant neoplasm of central portion of left breast in female, estrogen receptor negative [C50.112, Z17.1]    Treatments/Procedures: Procedure(s) (LRB):  MASTECTOMY (Left)  BIOPSY, LYMPH NODE, SENTINEL (Left)  INJECTION, FOR SENTINEL NODE IDENTIFICATION (Left)    Hospital Course: Uneventful; Discharged home.    Significant Diagnostic Studies: none    Disposition: Home or Self Care    Diet: Regular    Follow up: Office 10-14 days    Activity: No heavy lifting till seen in office.    Patient Instructions:   Current Discharge Medication List      START taking these medications    Details   HYDROcodone-acetaminophen (NORCO) 5-325 mg per tablet Take 1 tablet by mouth every 6 (six) hours as needed for Pain.  Qty: 15 tablet, Refills: 0    Comments: n/a          CONTINUE these medications which have NOT CHANGED    Details   olmesartan-amLODIPin-hcthiazid 40-5-25 mg Tab Take 1 tablet by mouth Daily.      ondansetron (ZOFRAN) 4 MG tablet Take 4 mg by mouth once.      traMADoL (ULTRAM) 50 mg tablet Take 50 mg by mouth once daily.      furosemide (LASIX) 40 MG tablet Take 40 mg by mouth once daily.         STOP taking these medications       ibuprofen (ADVIL,MOTRIN) 200 MG tablet Comments:   Reason for Stopping:               Discharge Procedure Orders   Remove dressing in 48 hours

## 2022-05-27 NOTE — TELEPHONE ENCOUNTER
----- Message from Angelina Le sent at 5/27/2022  2:13 PM CDT -----  Contact: pt  Type: Needs Medical Advice    Who Called: Chas Jimenes case manager  Best Call Back Number: [phone]    Inquiry/Question: pt is instructed to be seen 10-14 days post op. First avail was 6/13 (17days) Rn would like the office to call pt to schedule appt. Instead since it's past instructed number of days. Thank you~

## 2022-05-27 NOTE — CARE UPDATE
05/27/22 0842   Patient Assessment/Suction   Level of Consciousness (AVPU) alert   Respiratory Effort Unlabored   PRE-TX-O2   O2 Device (Oxygen Therapy) room air   SpO2 95 %   Pulse Oximetry Type Intermittent   $ Pulse Oximetry - Multiple Charge Pulse Oximetry - Multiple   Pulse 86   Resp 16   Incentive Spirometer   $ Incentive Spirometer Charges done with encouragement   Incentive Spirometer Predicted Level (mL) 1500   Administration (IS) proper technique demonstrated   Number of Repetitions (IS) 5   Level Incentive Spirometer (mL) 750   Patient Tolerance (IS) good;no adverse signs/symptoms present

## 2022-05-27 NOTE — PLAN OF CARE
Problem: Physical Therapy  Goal: Physical Therapy Goal  Description: Goals to be met by: 2022     Patient will increase functional independence with mobility by performin. Supine to sit with Contact Guard Assistance  2. Sit to stand transfer with Contact Guard Assistance  3. Bed to chair transfer with Contact Guard Assistance using Rolling Walker  4. Gait  x 250x2 feet with Contact Guard Assistance using Rolling Walker.   5. Lower extremity exercise program x20 reps   Outcome: Ongoing, Progressing   PT eval and treat- gait with own rollator 250ft with min assist. Bathroom use to void and OOB chair. Instructed on LE's thera ex. HHPT

## 2022-05-27 NOTE — PT/OT/SLP EVAL
Physical Therapy Evaluation    Patient Name:  Rosa Aceves   MRN:  [id number]    Recommendations:     Discharge Recommendations:  home health PT   Discharge Equipment Recommendations: none   Barriers to discharge: None    Assessment:     Rosa Aceves is a 81 y.o. female admitted with a medical diagnosis of <principal problem not specified>.  She presents with the following impairments/functional limitations:  weakness, impaired endurance, impaired functional mobilty, gait instability, decreased lower extremity function, impaired cardiopulmonary response to activity, impaired skin .    Pt seen supine in bed with daughter in attendance. Pt with 2 PRAVEENA L side breast. Pt has own rollator. Pt requiring min/mod assist to sit EOB. Ambulated with rollator at hallways 250ft with min assist and another person following with chair. Pt requesting bathroom use to void upon return to room-voided with set up assist for hygiene. OOB to chair and educated on LE's exercises and ankle pumping exercises.  Pt to benefit from continued therapies- HHPT.    Rehab Prognosis: Fair; patient would benefit from acute skilled PT services to address these deficits and reach maximum level of function.    Recent Surgery: Procedure(s) (LRB):  MASTECTOMY (Left)  BIOPSY, LYMPH NODE, SENTINEL (Left)  INJECTION, FOR SENTINEL NODE IDENTIFICATION (Left) 1 Day Post-Op    Plan:     During this hospitalization, patient to be seen 6 x/week to address the identified rehab impairments via gait training, therapeutic activities, therapeutic exercises and progress toward the following goals:    · Plan of Care Expires:       Subjective   Daughter at bedside who stated pt lives with son In the same property and is not as active  Pt stated a little sore   Pt agreeable to ambulate  Chief Complaint: a little sore L breast  Patient/Family Comments/goals: get well to go home  Pain/Comfort:  · Pain Rating 1: 0/10    Patients cultural, spiritual, Cheondoism conflicts  given the current situation:      Living Environment:  Home with son but will go home with daughter    Prior to admission, patients level of function was ambulatory with rollator.  Equipment used at home: rollator.  DME owned (not currently used): none.  Upon discharge, patient will have assistance from family.    Objective:     Communicated with nurse Calvo prior to session.  Patient found HOB elevated with peripheral IV, PRAVEENA drain, PureWick, bed alarm, telemetry  upon PT entry to room.    General Precautions: Standard, fall   Orthopedic Precautions:N/A   Braces: N/A  Respiratory Status: Room air    Exams:  · Postural Exam:  Patient presented with the following abnormalities:    · -       Rounded shoulders  · -       Forward head  · -       BMI 28  · RLE ROM: WFL  · RLE Strength: WFL  · LLE ROM: WFL  · LLE Strength: WFL    Functional Mobility:  · Bed Mobility:     · Rolling Right: minimum assistance  · Scooting: minimum assistance  · Supine to Sit: moderate assistance  · Transfers:     · Sit to Stand:  minimum assistance with rolling walker  · Bed to Chair: minimum assistance with  rolling walker  using  Stand Pivot  · Toilet Transfer: minimum assistance with  rolling walker  using  Stand Pivot  · Gait: 250ft with own rollator with min asist and another person following with chair    Therapeutic Activities and Exercises:   Patient was educated on the importance of OOB activity and functional mobility to negate negative effects of prolonged bed rest during hospitalization, safe transfers and ambulation, and D/C planning   Bathroom use to void with min assist  OOB chair with all needs within reach  Pt educated on thera ex that she could complete including ankle pumping exercises    AM-PAC 6 CLICK MOBILITY  Total Score:16     Patient left up in chair with all lines intact, call button in reach, chair alarm on, nurse Calvo notified and daughter present.    GOALS:   Multidisciplinary Problems     Physical Therapy Goals         Problem: Physical Therapy    Goal Priority Disciplines Outcome Goal Variances Interventions   Physical Therapy Goal     PT, PT/OT Ongoing, Progressing     Description: Goals to be met by: 2022     Patient will increase functional independence with mobility by performin. Supine to sit with Contact Guard Assistance  2. Sit to stand transfer with Contact Guard Assistance  3. Bed to chair transfer with Contact Guard Assistance using Rolling Walker  4. Gait  x 250x2 feet with Contact Guard Assistance using Rolling Walker.   5. Lower extremity exercise program x20 reps                    History:     Past Medical History:   Diagnosis Date    Aortic valve regurgitation     Benign paroxysmal vertigo, bilateral     Chronic kidney disease, unspecified     Coronary artery disease     Essential (primary) hypertension     Malignant neoplasm of right breast     Mitral valve regurgitation     Obesity, unspecified     Osteopenia     Positive colorectal cancer screening using Cologuard test     Rheumatoid arthritis, unspecified     Ruptured lumbar disc     Unspecified cataract        Past Surgical History:   Procedure Laterality Date    BREAST BIOPSY Left     BREAST SURGERY Left     CHOLECYSTECTOMY      EYE SURGERY      INJECTION FOR SENTINEL NODE IDENTIFICATION Left 2022    Procedure: INJECTION, FOR SENTINEL NODE IDENTIFICATION;  Surgeon: Renzo Jim MD;  Location: [facility] OR;  Service: General;  Laterality: Left;    MASTECTOMY Left 2022    Procedure: MASTECTOMY;  Surgeon: Renzo Jim MD;  Location: [facility] OR;  Service: General;  Laterality: Left;    MASTECTOMY, RADICAL Right     SENTINEL LYMPH NODE BIOPSY Left 2022    Procedure: BIOPSY, LYMPH NODE, SENTINEL;  Surgeon: Renzo Jim MD;  Location: [facility] OR;  Service: General;  Laterality: Left;    TUBAL LIGATION         Time Tracking:     PT Received On: 22  PT Start Time: 1153     PT Stop Time: 1225  PT  Total Time (min): 32 min     Billable Minutes: Evaluation 8, Gait Training 14 and Therapeutic Activity 10      05/27/2022

## 2022-05-27 NOTE — PLAN OF CARE
[facility]  Initial Discharge Assessment       Primary Care Provider: NIKOLAI Funes        completed the assessment with pt and daughter at bedside. Pt's son lves with her, but will discharge to daughter's house for a few weeks or until she gets stronger.  Demographic and PCP is current on face sheet. Pt verified [employer].  Denies POA/LW.  Denies diabetes, dialysis and coumadin. Disposition:  Pt will discharge to home with daughter. Daughter will provide transportation on discharge. OT/PT to evaluate. No other needs at this time.      Admission Diagnosis: Malignant neoplasm of central portion of left breast in female, estrogen receptor negative [C50.112, Z17.1]  Malignant neoplasm of central portion of left breast, estrogen receptor negative [C50.112, Z17.1]    Admission Date: 5/26/2022  Expected Discharge Date:     Discharge Barriers Identified: None    Payor: [employer] MANAGED MEDICARE / Plan: [employer] MEDICARE PPO / Product Type: Medicare Advantage /     Extended Emergency Contact Information  Primary Emergency Contact: MONA STARR  Mobile Phone: [phone]  Relation: Daughter  Secondary Emergency Contact: BRENDA BALES  Mobile Phone: [phone]  Relation: Son    Discharge Plan A: Home with family, Home Health  Discharge Plan B: Home with family      [id number] - [place], [place] - [address] AT [facility] [address]  [address]  [place] [address]  Phone: [phone] Fax: [phone]      Initial Assessment (most recent)     Adult Discharge Assessment - 05/27/22 0915        Discharge Assessment    Assessment Type Discharge Planning Assessment     Confirmed/corrected address, phone number and insurance Yes     Confirmed Demographics Correct on Facesheet     Source of Information patient     Lives With child(bhavana), adult     Facility Arrived From: home     Do you expect to return to your current living situation? No   pt will discharge to  daughter's home for a few weeks    Do you have help at home or someone to help you manage your care at home? Yes     Who are your caregiver(s) and their phone number(s)? daughter- Ana Montalvo [phone]/son Dilshad Montalvo [phone]     Prior to hospitilization cognitive status: Alert/Oriented     Current cognitive status: Alert/Oriented     Walking or Climbing Stairs Difficulty ambulation difficulty, requires equipment     Dressing/Bathing Difficulty none     Equipment Currently Used at Home rollator     Readmission within 30 days? No     Patient currently being followed by outpatient case management? Yes     If yes, name of outpatient case management following: insurance company assigned oupatient case management     Do you currently have service(s) that help you manage your care at home? No     Do you take prescription medications? Yes     Do you have prescription coverage? Yes     Do you have any problems affording any of your prescribed medications? No     Is the patient taking medications as prescribed? yes     Who is going to help you get home at discharge? daughter     How do you get to doctors appointments? family or friend will provide     Are you on dialysis? No     Do you take coumadin? No     Discharge Plan A Home with family;Home Health     Discharge Plan B Home with family     DME Needed Upon Discharge  none     Discharge Plan discussed with: Patient;Adult children     Discharge Barriers Identified None

## 2022-05-27 NOTE — PROGRESS NOTES
Discharge instructions were given to the patient and daughter on bedside. Patient and daughter stated understanding & teaching provided on caring for the drains. Daughter verbally stated understanding.

## 2022-05-27 NOTE — PLAN OF CARE
Pt is cleared from  for discharge.       05/27/22 8018   Final Note   Assessment Type Final Discharge Note   Anticipated Discharge Disposition Home-Health   Hospital Resources/Appts/Education Provided Appointments scheduled by Navigator/Coordinator

## 2022-05-28 PROCEDURE — G0180 PR HOME HEALTH MD CERTIFICATION: ICD-10-PCS | Mod: ,,, | Performed by: SURGERY

## 2022-05-28 PROCEDURE — G0180 MD CERTIFICATION HHA PATIENT: HCPCS | Mod: ,,, | Performed by: SURGERY

## 2022-05-30 NOTE — TELEPHONE ENCOUNTER
I called patient to inform her that ROXANA Charles will call her on Thursday morning to inform her of what time to arrive to see Dr. Jim.  Patient understood.  Darrius

## 2022-05-30 NOTE — TELEPHONE ENCOUNTER
I called patient to inform her that Dr. Jim wants to see her tomorrow in clinic in [place].  I'll call her back with the time.  MD Darrius Reza MA  Yes. Need to arrange for a time for me to see her in the office this week in [place].

## 2022-06-02 ENCOUNTER — OFFICE VISIT (OUTPATIENT)
Dept: SURGERY | Facility: CLINIC | Age: 82
End: 2022-06-02
Payer: MEDICARE

## 2022-06-02 ENCOUNTER — TELEPHONE (OUTPATIENT)
Dept: SURGERY | Facility: CLINIC | Age: 82
End: 2022-06-02
Payer: MEDICARE

## 2022-06-02 VITALS
SYSTOLIC BLOOD PRESSURE: 178 MMHG | WEIGHT: 172 LBS | TEMPERATURE: 78 F | HEART RATE: 84 BPM | DIASTOLIC BLOOD PRESSURE: 81 MMHG | BODY MASS INDEX: 28.19 KG/M2

## 2022-06-02 DIAGNOSIS — Z98.890 POST-OPERATIVE STATE: Primary | ICD-10-CM

## 2022-06-02 PROCEDURE — 1101F PT FALLS ASSESS-DOCD LE1/YR: CPT | Mod: CPTII,S$GLB,, | Performed by: SURGERY

## 2022-06-02 PROCEDURE — 3288F PR FALLS RISK ASSESSMENT DOCUMENTED: ICD-10-PCS | Mod: CPTII,S$GLB,, | Performed by: SURGERY

## 2022-06-02 PROCEDURE — 3288F FALL RISK ASSESSMENT DOCD: CPT | Mod: CPTII,S$GLB,, | Performed by: SURGERY

## 2022-06-02 PROCEDURE — 99999 PR PBB SHADOW E&M-EST. PATIENT-LVL III: ICD-10-PCS | Mod: PBBFAC,,, | Performed by: SURGERY

## 2022-06-02 PROCEDURE — 1101F PR PT FALLS ASSESS DOC 0-1 FALLS W/OUT INJ PAST YR: ICD-10-PCS | Mod: CPTII,S$GLB,, | Performed by: SURGERY

## 2022-06-02 PROCEDURE — 3077F PR MOST RECENT SYSTOLIC BLOOD PRESSURE >= 140 MM HG: ICD-10-PCS | Mod: CPTII,S$GLB,, | Performed by: SURGERY

## 2022-06-02 PROCEDURE — 1159F PR MEDICATION LIST DOCUMENTED IN MEDICAL RECORD: ICD-10-PCS | Mod: CPTII,S$GLB,, | Performed by: SURGERY

## 2022-06-02 PROCEDURE — 99024 PR POST-OP FOLLOW-UP VISIT: ICD-10-PCS | Mod: S$GLB,,, | Performed by: SURGERY

## 2022-06-02 PROCEDURE — 3077F SYST BP >= 140 MM HG: CPT | Mod: CPTII,S$GLB,, | Performed by: SURGERY

## 2022-06-02 PROCEDURE — 1159F MED LIST DOCD IN RCRD: CPT | Mod: CPTII,S$GLB,, | Performed by: SURGERY

## 2022-06-02 PROCEDURE — 1126F PR PAIN SEVERITY QUANTIFIED, NO PAIN PRESENT: ICD-10-PCS | Mod: CPTII,S$GLB,, | Performed by: SURGERY

## 2022-06-02 PROCEDURE — 3079F DIAST BP 80-89 MM HG: CPT | Mod: CPTII,S$GLB,, | Performed by: SURGERY

## 2022-06-02 PROCEDURE — 99999 PR PBB SHADOW E&M-EST. PATIENT-LVL III: CPT | Mod: PBBFAC,,, | Performed by: SURGERY

## 2022-06-02 PROCEDURE — 1126F AMNT PAIN NOTED NONE PRSNT: CPT | Mod: CPTII,S$GLB,, | Performed by: SURGERY

## 2022-06-02 PROCEDURE — 3079F PR MOST RECENT DIASTOLIC BLOOD PRESSURE 80-89 MM HG: ICD-10-PCS | Mod: CPTII,S$GLB,, | Performed by: SURGERY

## 2022-06-02 PROCEDURE — 1160F PR REVIEW ALL MEDS BY PRESCRIBER/CLIN PHARMACIST DOCUMENTED: ICD-10-PCS | Mod: CPTII,S$GLB,, | Performed by: SURGERY

## 2022-06-02 PROCEDURE — 1160F RVW MEDS BY RX/DR IN RCRD: CPT | Mod: CPTII,S$GLB,, | Performed by: SURGERY

## 2022-06-02 PROCEDURE — 99024 POSTOP FOLLOW-UP VISIT: CPT | Mod: S$GLB,,, | Performed by: SURGERY

## 2022-06-02 NOTE — TELEPHONE ENCOUNTER
Spoke with patient, advised Dr Jim can see her between 1130-100 or between 2-3 pm.  Will schedule her at 230 pm.  Dr Jim notified.

## 2022-06-02 NOTE — PROGRESS NOTES
POST-OP NOTE    PROCEDURE: Left mastectomy with sentinel node biopsy    COMPLAINTS: None.    EXAM: Incision well approximated. No drainage. No infection.  PRAVEENA: serosanguinous. Minimal drainage.      IMPRESSION: Doing well.  Path pending.    PLAN: Follow up in one week for drain removal.  One drain removed today.

## 2022-06-02 NOTE — TELEPHONE ENCOUNTER
----- Message from Marcelino Grijalva sent at 6/2/2022 10:12 AM CDT -----  Contact: JAYY BALES [[id number]]  Type: Patient Call Back    Who called:JAYY BALES [[id number]]    What is the request in detail: The patient states that she was contacted to come in today and would like to know what time she can be seen     Can the clinic reply by [facility]?    Would the patient rather a call back or a response via [facility]?     Best call back number: [phone] (mobile)    Additional Information:

## 2022-06-03 ENCOUNTER — TELEPHONE (OUTPATIENT)
Dept: SURGERY | Facility: CLINIC | Age: 82
End: 2022-06-03
Payer: MEDICARE

## 2022-06-03 NOTE — TELEPHONE ENCOUNTER
----- Message from Renzo Jim MD sent at 6/2/2022  4:18 PM CDT -----  Araceli,  Can we get her in next week for possible drain removal? She could see Katharine or one of the other guys.    I removed one drain today.    Thanks.

## 2022-06-06 ENCOUNTER — TELEPHONE (OUTPATIENT)
Dept: SURGERY | Facility: CLINIC | Age: 82
End: 2022-06-06
Payer: MEDICARE

## 2022-06-06 LAB
FINAL PATHOLOGIC DIAGNOSIS: NORMAL
FROZEN SECTION DIAGNOSIS: NORMAL
FROZEN SECTION FOOTNOTE: NORMAL
GROSS: NORMAL
Lab: NORMAL
MICROSCOPIC EXAM: NORMAL

## 2022-06-06 NOTE — TELEPHONE ENCOUNTER
Spoke with patient and daughter Ana, appointment scheduled tomorrow, June 7th with Katharine JENKINS at [facility] office.  Directions and address given

## 2022-06-06 NOTE — TELEPHONE ENCOUNTER
----- Message from Deepika Kumar sent at 6/6/2022  9:20 AM CDT -----  Contact: pt  Type: Needs Appt and advise    Who Called: pt  Best Call Back Number: [phone]    Inquiry/Question: pt states Dr Rocha wants her to see someone this week because she still has a drain tube in       Thank you~

## 2022-06-07 ENCOUNTER — OFFICE VISIT (OUTPATIENT)
Dept: SURGERY | Facility: CLINIC | Age: 82
End: 2022-06-07
Payer: MEDICARE

## 2022-06-07 VITALS
DIASTOLIC BLOOD PRESSURE: 93 MMHG | TEMPERATURE: 99 F | BODY MASS INDEX: 26.52 KG/M2 | WEIGHT: 165 LBS | RESPIRATION RATE: 16 BRPM | HEIGHT: 66 IN | HEART RATE: 72 BPM | SYSTOLIC BLOOD PRESSURE: 175 MMHG

## 2022-06-07 DIAGNOSIS — Z98.890 POST-OPERATIVE STATE: Primary | ICD-10-CM

## 2022-06-07 PROCEDURE — 99024 POSTOP FOLLOW-UP VISIT: CPT | Mod: S$GLB,,, | Performed by: PHYSICIAN ASSISTANT

## 2022-06-07 PROCEDURE — 99024 PR POST-OP FOLLOW-UP VISIT: ICD-10-PCS | Mod: S$GLB,,, | Performed by: PHYSICIAN ASSISTANT

## 2022-06-07 PROCEDURE — 1159F MED LIST DOCD IN RCRD: CPT | Mod: CPTII,S$GLB,, | Performed by: PHYSICIAN ASSISTANT

## 2022-06-07 PROCEDURE — 3288F PR FALLS RISK ASSESSMENT DOCUMENTED: ICD-10-PCS | Mod: CPTII,S$GLB,, | Performed by: PHYSICIAN ASSISTANT

## 2022-06-07 PROCEDURE — 1125F PR PAIN SEVERITY QUANTIFIED, PAIN PRESENT: ICD-10-PCS | Mod: CPTII,S$GLB,, | Performed by: PHYSICIAN ASSISTANT

## 2022-06-07 PROCEDURE — 3080F DIAST BP >= 90 MM HG: CPT | Mod: CPTII,S$GLB,, | Performed by: PHYSICIAN ASSISTANT

## 2022-06-07 PROCEDURE — 1125F AMNT PAIN NOTED PAIN PRSNT: CPT | Mod: CPTII,S$GLB,, | Performed by: PHYSICIAN ASSISTANT

## 2022-06-07 PROCEDURE — 1159F PR MEDICATION LIST DOCUMENTED IN MEDICAL RECORD: ICD-10-PCS | Mod: CPTII,S$GLB,, | Performed by: PHYSICIAN ASSISTANT

## 2022-06-07 PROCEDURE — 3080F PR MOST RECENT DIASTOLIC BLOOD PRESSURE >= 90 MM HG: ICD-10-PCS | Mod: CPTII,S$GLB,, | Performed by: PHYSICIAN ASSISTANT

## 2022-06-07 PROCEDURE — 3077F PR MOST RECENT SYSTOLIC BLOOD PRESSURE >= 140 MM HG: ICD-10-PCS | Mod: CPTII,S$GLB,, | Performed by: PHYSICIAN ASSISTANT

## 2022-06-07 PROCEDURE — 3077F SYST BP >= 140 MM HG: CPT | Mod: CPTII,S$GLB,, | Performed by: PHYSICIAN ASSISTANT

## 2022-06-07 PROCEDURE — 1101F PT FALLS ASSESS-DOCD LE1/YR: CPT | Mod: CPTII,S$GLB,, | Performed by: PHYSICIAN ASSISTANT

## 2022-06-07 PROCEDURE — 3288F FALL RISK ASSESSMENT DOCD: CPT | Mod: CPTII,S$GLB,, | Performed by: PHYSICIAN ASSISTANT

## 2022-06-07 PROCEDURE — 1101F PR PT FALLS ASSESS DOC 0-1 FALLS W/OUT INJ PAST YR: ICD-10-PCS | Mod: CPTII,S$GLB,, | Performed by: PHYSICIAN ASSISTANT

## 2022-06-07 NOTE — PROGRESS NOTES
Rosa Aceves is status post left mastectomy and presents today for follow-up care.  She has the following systemic symptoms or complaints: no complaints. Scant drainage from remaining PRAVEENA drain.     PE:  Incisions clean, dry, and intact. No signs of infection. Drain removed. No swelling. No left upper extremity edema.     Pathology: reviewed     A/P:  Normal post-operative course.    The patient is instructed to avoid heavy lifting until 4 weeks after the surgery date.  Return to clinic as needed

## 2022-06-22 ENCOUNTER — OFFICE VISIT (OUTPATIENT)
Dept: HEMATOLOGY/ONCOLOGY | Facility: CLINIC | Age: 82
End: 2022-06-22
Payer: MEDICARE

## 2022-06-22 VITALS
BODY MASS INDEX: 29.09 KG/M2 | RESPIRATION RATE: 20 BRPM | HEIGHT: 66 IN | WEIGHT: 181 LBS | DIASTOLIC BLOOD PRESSURE: 68 MMHG | HEART RATE: 76 BPM | SYSTOLIC BLOOD PRESSURE: 157 MMHG

## 2022-06-22 DIAGNOSIS — C50.919 TRIPLE NEGATIVE MALIGNANT NEOPLASM OF BREAST: ICD-10-CM

## 2022-06-22 DIAGNOSIS — Z17.1 MALIGNANT NEOPLASM OF CENTRAL PORTION OF LEFT BREAST IN FEMALE, ESTROGEN RECEPTOR NEGATIVE: Primary | ICD-10-CM

## 2022-06-22 DIAGNOSIS — C50.112 MALIGNANT NEOPLASM OF CENTRAL PORTION OF LEFT BREAST IN FEMALE, ESTROGEN RECEPTOR NEGATIVE: Primary | ICD-10-CM

## 2022-06-22 PROCEDURE — 99214 OFFICE O/P EST MOD 30 MIN: CPT | Mod: S$GLB,,, | Performed by: INTERNAL MEDICINE

## 2022-06-22 PROCEDURE — 3077F PR MOST RECENT SYSTOLIC BLOOD PRESSURE >= 140 MM HG: ICD-10-PCS | Mod: CPTII,S$GLB,, | Performed by: INTERNAL MEDICINE

## 2022-06-22 PROCEDURE — 3078F DIAST BP <80 MM HG: CPT | Mod: CPTII,S$GLB,, | Performed by: INTERNAL MEDICINE

## 2022-06-22 PROCEDURE — 3077F SYST BP >= 140 MM HG: CPT | Mod: CPTII,S$GLB,, | Performed by: INTERNAL MEDICINE

## 2022-06-22 PROCEDURE — 1159F MED LIST DOCD IN RCRD: CPT | Mod: CPTII,S$GLB,, | Performed by: INTERNAL MEDICINE

## 2022-06-22 PROCEDURE — 3078F PR MOST RECENT DIASTOLIC BLOOD PRESSURE < 80 MM HG: ICD-10-PCS | Mod: CPTII,S$GLB,, | Performed by: INTERNAL MEDICINE

## 2022-06-22 PROCEDURE — 1126F AMNT PAIN NOTED NONE PRSNT: CPT | Mod: CPTII,S$GLB,, | Performed by: INTERNAL MEDICINE

## 2022-06-22 PROCEDURE — 1159F PR MEDICATION LIST DOCUMENTED IN MEDICAL RECORD: ICD-10-PCS | Mod: CPTII,S$GLB,, | Performed by: INTERNAL MEDICINE

## 2022-06-22 PROCEDURE — 99214 PR OFFICE/OUTPT VISIT, EST, LEVL IV, 30-39 MIN: ICD-10-PCS | Mod: S$GLB,,, | Performed by: INTERNAL MEDICINE

## 2022-06-22 PROCEDURE — 1126F PR PAIN SEVERITY QUANTIFIED, NO PAIN PRESENT: ICD-10-PCS | Mod: CPTII,S$GLB,, | Performed by: INTERNAL MEDICINE

## 2022-06-26 NOTE — PROGRESS NOTES
[facility] In Office Hematology Oncology Subsequent Encounter Note    6/22/22      Subjective:      Patient ID:   Rosa Aceves  81 y.o. female  1940  [place]      Chief Complaint:   L breast cancer    HPI:  81 y.o. female had R breast cancer in 1996, treated with R MRM at [facility] in [place], [place].  She had adjuvant chemotherapy, no adjuvant Rad Rx.  Not clear, if she had adjuvant Tamoxifen.    Now with abnormal L mammogram.  1.5 cm mass 12 o'clock posteriorly at L breast.  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  Triple negative for ERP, PRP, H2N.    She had L mastectomy, Sentinal node bx 3 weeks ago.  LN negative.  She is triple negative.  Check PET.  Adjuvant chemotherapy?    C/O decreased ROM and pain at R shoulder and arm.  X rays NL.  Discussed MRI of R shoulder or Bone Scan or PET scan to check bone, shoulder sx.    Hgb 12.7 to 10, creatinine 1.4.  GFR 43%.      ROS:   GEN: normal without any fever, night sweats or weight loss  HEENT: normal with no HA's, sore throat, stiff neck, changes in vision  CV: normal with no CP, SOB, PND, FIGUEREDO or orthopnea  PULM: normal with no SOB, cough, hemoptysis, sputum or pleuritic pain  GI: normal with no abdominal pain, nausea, vomiting, constipation, diarrhea, melanotic stools, BRBPR, or hematemesis  : normal with no hematuria, dysuria  BREAST: See HPI  SKIN: normal with no rash, erythema, bruising, or swelling     Past Medical History:   Diagnosis Date    Aortic valve regurgitation     Benign paroxysmal vertigo, bilateral     Chronic kidney disease, unspecified     Coronary artery disease     Essential (primary) hypertension     Malignant neoplasm of right breast     Mitral valve regurgitation     Obesity, unspecified     Osteopenia     Positive colorectal cancer screening using Cologuard test     Rheumatoid arthritis, unspecified     Ruptured lumbar disc     Unspecified cataract      Past Surgical  "History:   Procedure Laterality Date    BREAST BIOPSY Left     BREAST SURGERY Left     CHOLECYSTECTOMY      EYE SURGERY      INJECTION FOR SENTINEL NODE IDENTIFICATION Left 5/26/2022    Procedure: INJECTION, FOR SENTINEL NODE IDENTIFICATION;  Surgeon: Renzo Jim MD;  Location: [facility] OR;  Service: General;  Laterality: Left;    MASTECTOMY Left 5/26/2022    Procedure: MASTECTOMY;  Surgeon: eRnzo Jim MD;  Location: [facility] OR;  Service: General;  Laterality: Left;    MASTECTOMY, RADICAL Right     SENTINEL LYMPH NODE BIOPSY Left 5/26/2022    Procedure: BIOPSY, LYMPH NODE, SENTINEL;  Surgeon: Renzo Jim MD;  Location: [facility] OR;  Service: General;  Laterality: Left;    TUBAL LIGATION         Review of patient's allergies indicates:  No Known Allergies  Social History     Socioeconomic History    Marital status:     Number of children: 5   Tobacco Use    Smoking status: Never Smoker    Smokeless tobacco: Never Used   Substance and Sexual Activity    Alcohol use: Never    Drug use: Never    Sexual activity: Not Currently     Partners: Male         Current Outpatient Medications:     furosemide (LASIX) 40 MG tablet, Take 40 mg by mouth once daily., Disp: , Rfl:     olmesartan-amLODIPin-hcthiazid 40-5-25 mg Tab, Take 1 tablet by mouth Daily., Disp: , Rfl:     ondansetron (ZOFRAN) 4 MG tablet, Take 4 mg by mouth once., Disp: , Rfl:     traMADoL (ULTRAM) 50 mg tablet, Take 50 mg by mouth once daily., Disp: , Rfl:           Objective:   Vitals:  Blood pressure (!) 157/68, pulse 76, resp. rate 20, height 5' 5.5" (1.664 m), weight 82.1 kg (181 lb).    Physical Examination:   GEN: no apparent distress, comfortable  HEAD: atraumatic and normocephalic  EYES: no pallor, no icterus  ENT:  no pharyngeal erythema, external ears WNL; no nasal discharge  NECK: no masses, thyroid normal, trachea midline, no LAD/LN's, supple  CV: RRR with no murmur; normal pulse; normal S1 and S2; no pedal " edema  CHEST: Normal respiratory effort; CTAB; normal breath sounds; no wheeze or crackles  ABDOM: nontender and nondistended; soft;  no rebound/guarding, L/S NP  MUSC/Skeletal: ROM normal; no crepitus; joints normal  EXTREM: no clubbing, cyanosis, inflammation or swelling  SKIN: no rashes, lesions, ulcers, petechiae   : no cvat  NEURO: grossly intact; motor/sensory WNL;  no tremors  PSYCH: normal mood, affect and behavior  LYMPH: normal cervical, supraclavicular, axillary and LN's  BREAST: L breast NT, no D/C and no palpable mass  R chest wall NT, postop changes, no palpable mass  She has decreased ROM at R shoulder and arm.    CBC, CMP, TSH unremarkable    Assessment:   (1) 81 y.o. female with diagnosis of invasive ductal Ca with lobular features at 12 o'clock at L breast.  1.5 cm, Triple negative for ERP, PRP, H2N.  S/P L mastectomy Primary 1.5 cm, LN negative.    (2)Hx of R breast cancer 25 years ago.  R MRM, adjuvant chemotherapy,adjuvant tamoxifen?    (3)Discussed neoadjuvant chemotherapy with Carbo, taxotere or CTX, taxotere, or AC q 3 weeks x's 4-6 cycles.  Followed by L mastectomy and SNBx and adjuvant Rad Rx.  Or L mastectomy, SNBx and no Rad Rx necessary.    (3)Because of age, above medical problems, frail appearance; I am hesitant to go with neoadjuvant chemo initially,  She may not have the stamina for later surgery.    (4)I would go with surgery initially,and then after she recovers, will reconsider her later for adjuvant Rx.?    (5)Check PET for staging, given triple negative dx, R shoulder sx.    (6)Eval of decreased ROM and pain at R shoulder.  Check MRI of shoulder at .    RTC 2 weeks.

## 2022-06-30 ENCOUNTER — EXTERNAL HOME HEALTH (OUTPATIENT)
Dept: HOME HEALTH SERVICES | Facility: HOSPITAL | Age: 82
End: 2022-06-30
Payer: MEDICARE

## 2022-07-18 ENCOUNTER — HOSPITAL ENCOUNTER (OUTPATIENT)
Dept: RADIOLOGY | Facility: HOSPITAL | Age: 82
Discharge: HOME | End: 2022-07-18
Attending: INTERNAL MEDICINE
Payer: MEDICARE

## 2022-07-18 VITALS — HEIGHT: 63 IN | BODY MASS INDEX: 30.65 KG/M2 | WEIGHT: 173 LBS

## 2022-07-18 DIAGNOSIS — C50.919 TRIPLE NEGATIVE MALIGNANT NEOPLASM OF BREAST: ICD-10-CM

## 2022-07-18 DIAGNOSIS — C50.112 MALIGNANT NEOPLASM OF CENTRAL PORTION OF LEFT BREAST IN FEMALE, ESTROGEN RECEPTOR NEGATIVE: ICD-10-CM

## 2022-07-18 DIAGNOSIS — Z17.1 MALIGNANT NEOPLASM OF CENTRAL PORTION OF LEFT BREAST IN FEMALE, ESTROGEN RECEPTOR NEGATIVE: ICD-10-CM

## 2022-07-18 LAB — GLUCOSE SERPL-MCNC: 100 MG/DL (ref 70–110)

## 2022-07-18 PROCEDURE — 78815 PET IMAGE W/CT SKULL-THIGH: CPT | Mod: TC,PO

## 2022-07-21 ENCOUNTER — OFFICE VISIT (OUTPATIENT)
Dept: HEMATOLOGY/ONCOLOGY | Facility: CLINIC | Age: 82
End: 2022-07-21
Payer: MEDICARE

## 2022-07-21 VITALS
DIASTOLIC BLOOD PRESSURE: 66 MMHG | TEMPERATURE: 98 F | SYSTOLIC BLOOD PRESSURE: 144 MMHG | BODY MASS INDEX: 30.45 KG/M2 | RESPIRATION RATE: 18 BRPM | HEART RATE: 66 BPM | HEIGHT: 63 IN | WEIGHT: 171.88 LBS

## 2022-07-21 DIAGNOSIS — D70.1 CHEMOTHERAPY INDUCED NEUTROPENIA: ICD-10-CM

## 2022-07-21 DIAGNOSIS — C50.112 MALIGNANT NEOPLASM OF CENTRAL PORTION OF LEFT BREAST IN FEMALE, ESTROGEN RECEPTOR NEGATIVE: Primary | ICD-10-CM

## 2022-07-21 DIAGNOSIS — T45.1X5A CHEMOTHERAPY INDUCED NEUTROPENIA: ICD-10-CM

## 2022-07-21 DIAGNOSIS — Z17.1 MALIGNANT NEOPLASM OF CENTRAL PORTION OF LEFT BREAST IN FEMALE, ESTROGEN RECEPTOR NEGATIVE: Primary | ICD-10-CM

## 2022-07-21 DIAGNOSIS — C50.919 TRIPLE NEGATIVE MALIGNANT NEOPLASM OF BREAST: ICD-10-CM

## 2022-07-21 PROCEDURE — 3288F PR FALLS RISK ASSESSMENT DOCUMENTED: ICD-10-PCS | Mod: CPTII,S$GLB,, | Performed by: INTERNAL MEDICINE

## 2022-07-21 PROCEDURE — 1159F MED LIST DOCD IN RCRD: CPT | Mod: CPTII,S$GLB,, | Performed by: INTERNAL MEDICINE

## 2022-07-21 PROCEDURE — 3077F PR MOST RECENT SYSTOLIC BLOOD PRESSURE >= 140 MM HG: ICD-10-PCS | Mod: CPTII,S$GLB,, | Performed by: INTERNAL MEDICINE

## 2022-07-21 PROCEDURE — 1101F PT FALLS ASSESS-DOCD LE1/YR: CPT | Mod: CPTII,S$GLB,, | Performed by: INTERNAL MEDICINE

## 2022-07-21 PROCEDURE — 99214 OFFICE O/P EST MOD 30 MIN: CPT | Mod: S$GLB,,, | Performed by: INTERNAL MEDICINE

## 2022-07-21 PROCEDURE — 1126F AMNT PAIN NOTED NONE PRSNT: CPT | Mod: CPTII,S$GLB,, | Performed by: INTERNAL MEDICINE

## 2022-07-21 PROCEDURE — 3077F SYST BP >= 140 MM HG: CPT | Mod: CPTII,S$GLB,, | Performed by: INTERNAL MEDICINE

## 2022-07-21 PROCEDURE — 3078F DIAST BP <80 MM HG: CPT | Mod: CPTII,S$GLB,, | Performed by: INTERNAL MEDICINE

## 2022-07-21 PROCEDURE — 3078F PR MOST RECENT DIASTOLIC BLOOD PRESSURE < 80 MM HG: ICD-10-PCS | Mod: CPTII,S$GLB,, | Performed by: INTERNAL MEDICINE

## 2022-07-21 PROCEDURE — 3288F FALL RISK ASSESSMENT DOCD: CPT | Mod: CPTII,S$GLB,, | Performed by: INTERNAL MEDICINE

## 2022-07-21 PROCEDURE — 1101F PR PT FALLS ASSESS DOC 0-1 FALLS W/OUT INJ PAST YR: ICD-10-PCS | Mod: CPTII,S$GLB,, | Performed by: INTERNAL MEDICINE

## 2022-07-21 PROCEDURE — 1159F PR MEDICATION LIST DOCUMENTED IN MEDICAL RECORD: ICD-10-PCS | Mod: CPTII,S$GLB,, | Performed by: INTERNAL MEDICINE

## 2022-07-21 PROCEDURE — 1126F PR PAIN SEVERITY QUANTIFIED, NO PAIN PRESENT: ICD-10-PCS | Mod: CPTII,S$GLB,, | Performed by: INTERNAL MEDICINE

## 2022-07-21 PROCEDURE — 99214 PR OFFICE/OUTPT VISIT, EST, LEVL IV, 30-39 MIN: ICD-10-PCS | Mod: S$GLB,,, | Performed by: INTERNAL MEDICINE

## 2022-07-22 ENCOUNTER — TELEPHONE (OUTPATIENT)
Dept: HEMATOLOGY/ONCOLOGY | Facility: HOSPITAL | Age: 82
End: 2022-07-22

## 2022-07-22 DIAGNOSIS — Z17.1 MALIGNANT NEOPLASM OF CENTRAL PORTION OF LEFT BREAST IN FEMALE, ESTROGEN RECEPTOR NEGATIVE: Primary | ICD-10-CM

## 2022-07-22 DIAGNOSIS — C50.112 MALIGNANT NEOPLASM OF CENTRAL PORTION OF LEFT BREAST IN FEMALE, ESTROGEN RECEPTOR NEGATIVE: Primary | ICD-10-CM

## 2022-07-22 DIAGNOSIS — T45.1X5A CHEMOTHERAPY INDUCED NEUTROPENIA: ICD-10-CM

## 2022-07-22 DIAGNOSIS — C50.112 MALIGNANT NEOPLASM OF CENTRAL PORTION OF LEFT BREAST, ESTROGEN RECEPTOR NEGATIVE: ICD-10-CM

## 2022-07-22 DIAGNOSIS — Z17.1 MALIGNANT NEOPLASM OF CENTRAL PORTION OF LEFT BREAST, ESTROGEN RECEPTOR NEGATIVE: ICD-10-CM

## 2022-07-22 DIAGNOSIS — D70.1 CHEMOTHERAPY INDUCED NEUTROPENIA: ICD-10-CM

## 2022-07-22 RX ORDER — FAMOTIDINE 10 MG/ML
20 INJECTION INTRAVENOUS ONCE
Status: CANCELLED
Start: 2022-08-15

## 2022-07-22 RX ORDER — DIPHENHYDRAMINE HYDROCHLORIDE 50 MG/ML
50 INJECTION INTRAMUSCULAR; INTRAVENOUS ONCE AS NEEDED
Status: CANCELLED | OUTPATIENT
Start: 2022-08-15

## 2022-07-22 RX ORDER — HEPARIN 100 UNIT/ML
500 SYRINGE INTRAVENOUS
Status: CANCELLED | OUTPATIENT
Start: 2022-08-15

## 2022-07-22 RX ORDER — EPINEPHRINE 0.3 MG/.3ML
0.3 INJECTION SUBCUTANEOUS ONCE AS NEEDED
Status: CANCELLED | OUTPATIENT
Start: 2022-08-15

## 2022-07-22 RX ORDER — SODIUM CHLORIDE 0.9 % (FLUSH) 0.9 %
10 SYRINGE (ML) INJECTION
Status: CANCELLED | OUTPATIENT
Start: 2022-08-15

## 2022-07-22 RX ORDER — SODIUM CHLORIDE 9 MG/ML
INJECTION, SOLUTION INTRAVENOUS CONTINUOUS
Status: CANCELLED | OUTPATIENT
Start: 2022-08-01

## 2022-07-23 NOTE — TELEPHONE ENCOUNTER
Orders placed in Epic for chemo to start 8/3 or 8/4  q 3 weeks x's 6 cycles, udenyca support.    CBC cmp HH M or T weekly.    Port placement Dr. Renzo Bullard 7/25/22 week.    Get date and time    Get auth    Chemo school    Co pay assistance    I will see her D1C1 in infusion.

## 2022-07-23 NOTE — PROGRESS NOTES
[facility] In Office Hematology Oncology Subsequent Encounter Note    7/21/22      Subjective:      Patient ID:   Rosa Aceves  81 y.o. female  1940  [place]      Chief Complaint:   L breast cancer    HPI:  81 y.o. female had R breast cancer in 1996, treated with R MRM at [facility] in [place], [place].  She had adjuvant chemotherapy, no adjuvant Rad Rx.  Not clear, if she had adjuvant Tamoxifen.    Now with abnormal L mammogram.  1.5 cm mass 12 o'clock posteriorly at L breast.  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  Triple negative for ERP, PRP, H2N.    She had L mastectomy, Sentinal node bx 7 weeks ago.  LN negative.  0/1 positive.  She is triple negative.  Check PET.  7/18/22 [facility]    RR estimated 25- 30% at 4-5 years out with observation.  Adjuvant chemo with carboplatin, taxotere q 3 weeks x's 4-6 cycles,  udenyca support, gives RR 15% expected.  Can defer adriamycin administration.    Discussed treatment schema, potential benefit in reducing recurrence risk,  Potential side effects including hair loss, N/V/D, peripheral neuropathy, pancytopenia.  And other side effects.  Other regimens include adriamycin, but she had this 25 years ago and tolerated it with difficulty.    Peripheral veins poor, port placement per Dr. Renzo Bullard.    Tamoxifen, Arimidex not helpful here given negative ERP and PRP status.  Herceptin not helpful here given negative H@N status.  Triple negative dx.    Hgb 12.7 to 10, creatinine 1.4.  GFR 43%.      ROS:   GEN: normal without any fever, night sweats or weight loss  HEENT: normal with no HA's, sore throat, stiff neck, changes in vision  CV: normal with no CP, SOB, PND, FIGUEREDO or orthopnea  PULM: normal with no SOB, cough, hemoptysis, sputum or pleuritic pain  GI: normal with no abdominal pain, nausea, vomiting, constipation, diarrhea, melanotic stools, BRBPR, or hematemesis  : normal with no hematuria, dysuria  BREAST: See  HPI  SKIN: normal with no rash, erythema, bruising, or swelling     Past Medical History:   Diagnosis Date    Aortic valve regurgitation     Benign paroxysmal vertigo, bilateral     Chronic kidney disease, unspecified     Coronary artery disease     Essential (primary) hypertension     Malignant neoplasm of right breast     Mitral valve regurgitation     Obesity, unspecified     Osteopenia     Positive colorectal cancer screening using Cologuard test     Rheumatoid arthritis, unspecified     Ruptured lumbar disc     Unspecified cataract      Past Surgical History:   Procedure Laterality Date    BREAST BIOPSY Left     BREAST SURGERY Left     CHOLECYSTECTOMY      EYE SURGERY      INJECTION FOR SENTINEL NODE IDENTIFICATION Left 5/26/2022    Procedure: INJECTION, FOR SENTINEL NODE IDENTIFICATION;  Surgeon: Renzo Jmi MD;  Location: [facility] OR;  Service: General;  Laterality: Left;    MASTECTOMY Left 5/26/2022    Procedure: MASTECTOMY;  Surgeon: Renzo Jim MD;  Location: [facility] OR;  Service: General;  Laterality: Left;    MASTECTOMY, RADICAL Right     SENTINEL LYMPH NODE BIOPSY Left 5/26/2022    Procedure: BIOPSY, LYMPH NODE, SENTINEL;  Surgeon: Renzo Jim MD;  Location: [facility] OR;  Service: General;  Laterality: Left;    TUBAL LIGATION         Review of patient's allergies indicates:  No Known Allergies  Social History     Socioeconomic History    Marital status:     Number of children: 5   Tobacco Use    Smoking status: Never Smoker    Smokeless tobacco: Never Used   Substance and Sexual Activity    Alcohol use: Never    Drug use: Never    Sexual activity: Not Currently     Partners: Male         Current Outpatient Medications:     furosemide (LASIX) 40 MG tablet, Take 40 mg by mouth once daily., Disp: , Rfl:     olmesartan-amLODIPin-hcthiazid 40-5-25 mg Tab, Take 1 tablet by mouth Daily., Disp: , Rfl:     ondansetron (ZOFRAN) 4 MG tablet, Take 4 mg by mouth  "once., Disp: , Rfl:     traMADoL (ULTRAM) 50 mg tablet, Take 50 mg by mouth once daily., Disp: , Rfl:           Objective:   Vitals:  Blood pressure (!) 144/66, pulse 66, temperature 97.6 °F (36.4 °C), resp. rate 18, height 5' 3" (1.6 m), weight 78 kg (171 lb 14.4 oz).    Physical Examination:   GEN: no apparent distress, comfortable  HEAD: atraumatic and normocephalic  EYES: no pallor, no icterus  ENT:  no pharyngeal erythema, external ears WNL; no nasal discharge  NECK: no masses, thyroid normal, trachea midline, no LAD/LN's, supple  CV: RRR with no murmur; normal pulse; normal S1 and S2; no pedal edema  CHEST: Normal respiratory effort; CTAB; normal breath sounds; no wheeze or crackles  ABDOM: nontender and nondistended; soft;  no rebound/guarding, L/S NP  MUSC/Skeletal: ROM normal; no crepitus; joints normal  EXTREM: no clubbing, cyanosis, inflammation or swelling  SKIN: no rashes, lesions, ulcers, petechiae   : no cvat  NEURO: grossly intact; motor/sensory WNL;  no tremors  PSYCH: normal mood, affect and behavior  LYMPH: normal cervical, supraclavicular, axillary and LN's  BREAST: L breast NT, no D/C and no palpable mass  R chest wall NT, postop changes, no palpable mass, incision healed.  She has decreased ROM at R shoulder and arm.    CBC, CMP, TSH unremarkable    Assessment:   (1) 81 y.o. female with diagnosis of invasive ductal Ca with lobular features at 12 o'clock at L breast.  1.5 cm, Triple negative for ERP, PRP, H2N.  S/P L mastectomy Primary 1.5 cm, LN negative.    (2)Hx of R breast cancer 25 years ago.  R MRM, adjuvant chemotherapy,adjuvant tamoxifen?    (3)Discussed neoadjuvant chemotherapy with Carbo, taxotere or CTX, taxotere, or AC q 3 weeks x's 4-6 cycles.  Followed by L mastectomy and SNBx and adjuvant Rad Rx.  Or L mastectomy, SNBx and no Rad Rx necessary.    (3)Because of age, above medical problems, frail appearance; I am hesitant to go with neoadjuvant chemo initially,  She may not have " "the stamina for later surgery.    (4)I would go with surgery initially,and then after she recovers, will reconsider her later for adjuvant Rx.?    (5)Check PET for staging, given triple negative dx, R shoulder sx.  PET [facility].    (6)S/P L mastectomy, SNBx. 5/26/22    Port placement 7 25 week    Adjuvant Carbo, Taxotere q 3 weeks x's 4-6 cycles.  Udenyca support.  D1C1 8/3 or 8/4.    5'6"   177#    See me 8/3 or 8/4.    RTC 2 weeks.                "

## 2022-07-25 NOTE — PLAN OF CARE
-- DO NOT REPLY / DO NOT REPLY ALL --  -- Message is from the [facility]--    General Patient Message      Reason for Call: Patient is going to school and needs a certificate of health filled out for the patient asap. Please call to assist Mother stated she will come drop off forms, let her know whats the next step    Caller Information       Type Contact Phone/Fax    07/25/2022 01:22 PM CDT Phone (Incoming) AUBREY HERNANDEZMEGAN (Mother) [phone]          Alternative phone number: none    Turnaround time given to caller:   \"This message will be sent to [state Provider's name]. The clinical team will fulfill your request as soon as they review your message.\"     Cm spoke with daughter via phone. Daughter has accepted 1st available.  [facility] [place] [facility]  Verbal consent signed for hh-PT       05/27/22 2579   Post-Acute Status   Post-Acute Authorization Home Health   Home Health Status Referrals Sent

## 2022-07-27 PROCEDURE — G0179 MD RECERTIFICATION HHA PT: HCPCS | Mod: LT,,, | Performed by: SURGERY

## 2022-07-27 PROCEDURE — G0179 PR HOME HEALTH MD RECERTIFICATION: ICD-10-PCS | Mod: LT,,, | Performed by: SURGERY

## 2022-07-28 ENCOUNTER — PATIENT MESSAGE (OUTPATIENT)
Dept: SURGERY | Facility: CLINIC | Age: 82
End: 2022-07-28
Payer: MEDICARE

## 2022-07-28 ENCOUNTER — TELEPHONE (OUTPATIENT)
Dept: SURGERY | Facility: CLINIC | Age: 82
End: 2022-07-28
Payer: MEDICARE

## 2022-07-28 ENCOUNTER — TELEPHONE (OUTPATIENT)
Dept: SURGERY | Facility: HOSPITAL | Age: 82
End: 2022-07-28
Payer: MEDICARE

## 2022-07-28 DIAGNOSIS — Z01.818 PRE-OP TESTING: ICD-10-CM

## 2022-07-28 NOTE — TELEPHONE ENCOUNTER
Good morning!  Patient only has one dose of COVID vaccine. Please reach out to patient to schedule COVID testing prior to procedure on Monday, 8/1.   Thank you!

## 2022-07-28 NOTE — TELEPHONE ENCOUNTER
Left message, advised she needs a covid test prior to surgery so if she can go to [facility] and have that done tomorrow anytime between 8 am and 12 noon

## 2022-07-28 NOTE — TELEPHONE ENCOUNTER
Spoke with patient, advised she needs a covid test prior to surgery on Monday.  She would like to have it done in [place].  Advised that it is ok as long as we can get the results for her surgery.  States she will go to her daughters doctor in [place] and see if she can get it done there tomorrow.

## 2022-07-29 ENCOUNTER — ANESTHESIA EVENT (OUTPATIENT)
Dept: SURGERY | Facility: HOSPITAL | Age: 82
End: 2022-07-29
Payer: MEDICARE

## 2022-07-29 ENCOUNTER — LAB VISIT (OUTPATIENT)
Dept: PRIMARY CARE CLINIC | Facility: CLINIC | Age: 82
End: 2022-07-29
Payer: MEDICARE

## 2022-07-29 DIAGNOSIS — Z01.818 PRE-OP TESTING: ICD-10-CM

## 2022-07-29 LAB
SARS-COV-2 RNA RESP QL NAA+PROBE: NOT DETECTED
SARS-COV-2- CYCLE NUMBER: NORMAL

## 2022-07-29 PROCEDURE — U0005 INFEC AGEN DETEC AMPLI PROBE: HCPCS | Performed by: SURGERY

## 2022-07-29 PROCEDURE — U0003 INFECTIOUS AGENT DETECTION BY NUCLEIC ACID (DNA OR RNA); SEVERE ACUTE RESPIRATORY SYNDROME CORONAVIRUS 2 (SARS-COV-2) (CORONAVIRUS DISEASE [COVID-19]), AMPLIFIED PROBE TECHNIQUE, MAKING USE OF HIGH THROUGHPUT TECHNOLOGIES AS DESCRIBED BY CMS-2020-01-R: HCPCS | Performed by: SURGERY

## 2022-08-01 ENCOUNTER — ANESTHESIA (OUTPATIENT)
Dept: SURGERY | Facility: HOSPITAL | Age: 82
End: 2022-08-01
Payer: MEDICARE

## 2022-08-01 ENCOUNTER — HOSPITAL ENCOUNTER (OUTPATIENT)
Facility: HOSPITAL | Age: 82
Discharge: HOME | End: 2022-08-01
Attending: SURGERY | Admitting: SURGERY
Payer: MEDICARE

## 2022-08-01 ENCOUNTER — HOSPITAL ENCOUNTER (OUTPATIENT)
Dept: RADIOLOGY | Facility: HOSPITAL | Age: 82
Discharge: HOME | End: 2022-08-01
Attending: SURGERY
Payer: MEDICARE

## 2022-08-01 VITALS
BODY MASS INDEX: 29.49 KG/M2 | SYSTOLIC BLOOD PRESSURE: 147 MMHG | RESPIRATION RATE: 18 BRPM | DIASTOLIC BLOOD PRESSURE: 74 MMHG | HEART RATE: 85 BPM | WEIGHT: 177 LBS | OXYGEN SATURATION: 100 % | TEMPERATURE: 98 F | HEIGHT: 65 IN

## 2022-08-01 DIAGNOSIS — C50.112 MALIGNANT NEOPLASM OF CENTRAL PORTION OF LEFT BREAST IN FEMALE, ESTROGEN RECEPTOR NEGATIVE: ICD-10-CM

## 2022-08-01 DIAGNOSIS — Z17.1 MALIGNANT NEOPLASM OF CENTRAL PORTION OF LEFT BREAST IN FEMALE, ESTROGEN RECEPTOR NEGATIVE: ICD-10-CM

## 2022-08-01 DIAGNOSIS — Z17.1 MALIGNANT NEOPLASM OF CENTRAL PORTION OF LEFT BREAST, ESTROGEN RECEPTOR NEGATIVE: Primary | ICD-10-CM

## 2022-08-01 DIAGNOSIS — C50.112 MALIGNANT NEOPLASM OF CENTRAL PORTION OF LEFT BREAST, ESTROGEN RECEPTOR NEGATIVE: Primary | ICD-10-CM

## 2022-08-01 DIAGNOSIS — Z95.828 PORT-A-CATH IN PLACE: ICD-10-CM

## 2022-08-01 PROCEDURE — 36000706: Mod: PO | Performed by: SURGERY

## 2022-08-01 PROCEDURE — 27200651 HC AIRWAY, LMA: Mod: PO | Performed by: ANESTHESIOLOGY

## 2022-08-01 PROCEDURE — 71000015 HC POSTOP RECOV 1ST HR: Mod: PO | Performed by: SURGERY

## 2022-08-01 PROCEDURE — 25000003 PHARM REV CODE 250: Mod: PO | Performed by: NURSE ANESTHETIST, CERTIFIED REGISTERED

## 2022-08-01 PROCEDURE — 71045 XR CHEST 1 VIEW: ICD-10-PCS | Mod: 26,,, | Performed by: RADIOLOGY

## 2022-08-01 PROCEDURE — 71000033 HC RECOVERY, INTIAL HOUR: Mod: PO | Performed by: SURGERY

## 2022-08-01 PROCEDURE — 63600175 PHARM REV CODE 636 W HCPCS: Mod: PO | Performed by: ANESTHESIOLOGY

## 2022-08-01 PROCEDURE — 77001 CHG FLUOROGUIDE CNTRL VEN ACCESS,PLACE,REPLACE,REMOVE: ICD-10-PCS | Mod: 26,,, | Performed by: SURGERY

## 2022-08-01 PROCEDURE — D9220A PRA ANESTHESIA: Mod: ANES,,, | Performed by: ANESTHESIOLOGY

## 2022-08-01 PROCEDURE — 37000008 HC ANESTHESIA 1ST 15 MINUTES: Mod: PO | Performed by: SURGERY

## 2022-08-01 PROCEDURE — 63600175 PHARM REV CODE 636 W HCPCS: Mod: PO | Performed by: SURGERY

## 2022-08-01 PROCEDURE — D9220A PRA ANESTHESIA: ICD-10-PCS | Mod: ANES,,, | Performed by: ANESTHESIOLOGY

## 2022-08-01 PROCEDURE — 36000707: Mod: PO | Performed by: SURGERY

## 2022-08-01 PROCEDURE — 37000009 HC ANESTHESIA EA ADD 15 MINS: Mod: PO | Performed by: SURGERY

## 2022-08-01 PROCEDURE — C1788 PORT, INDWELLING, IMP: HCPCS | Mod: PO | Performed by: SURGERY

## 2022-08-01 PROCEDURE — D9220A PRA ANESTHESIA: Mod: CRNA,,, | Performed by: NURSE ANESTHETIST, CERTIFIED REGISTERED

## 2022-08-01 PROCEDURE — 36561 INSERT TUNNELED CV CATH: CPT | Mod: 79,RT,, | Performed by: SURGERY

## 2022-08-01 PROCEDURE — 77001 FLUOROGUIDE FOR VEIN DEVICE: CPT | Mod: 26,,, | Performed by: SURGERY

## 2022-08-01 PROCEDURE — 71045 X-RAY EXAM CHEST 1 VIEW: CPT | Mod: 26,,, | Performed by: RADIOLOGY

## 2022-08-01 PROCEDURE — 25000003 PHARM REV CODE 250: Mod: PO | Performed by: SURGERY

## 2022-08-01 PROCEDURE — 63600175 PHARM REV CODE 636 W HCPCS: Mod: PO | Performed by: NURSE ANESTHETIST, CERTIFIED REGISTERED

## 2022-08-01 PROCEDURE — 36561 PR INSERT TUNNELED CV CATH WITH PORT: ICD-10-PCS | Mod: 79,RT,, | Performed by: SURGERY

## 2022-08-01 PROCEDURE — 71045 X-RAY EXAM CHEST 1 VIEW: CPT | Mod: TC,FY,PO

## 2022-08-01 PROCEDURE — 76000 FLUOROSCOPY <1 HR PHYS/QHP: CPT | Mod: TC,PO

## 2022-08-01 PROCEDURE — D9220A PRA ANESTHESIA: ICD-10-PCS | Mod: CRNA,,, | Performed by: NURSE ANESTHETIST, CERTIFIED REGISTERED

## 2022-08-01 DEVICE — KIT POWERPORT SINGLE 8FR: Type: IMPLANTABLE DEVICE | Site: SUBCLAVIAN | Status: FUNCTIONAL

## 2022-08-01 RX ORDER — LIDOCAINE HYDROCHLORIDE 10 MG/ML
1 INJECTION, SOLUTION EPIDURAL; INFILTRATION; INTRACAUDAL; PERINEURAL ONCE
Status: ACTIVE | OUTPATIENT
Start: 2022-08-01

## 2022-08-01 RX ORDER — FENTANYL CITRATE 50 UG/ML
25 INJECTION, SOLUTION INTRAMUSCULAR; INTRAVENOUS EVERY 5 MIN PRN
Status: ACTIVE | OUTPATIENT
Start: 2022-08-01

## 2022-08-01 RX ORDER — FENTANYL CITRATE 50 UG/ML
INJECTION, SOLUTION INTRAMUSCULAR; INTRAVENOUS
Status: DISCONTINUED | OUTPATIENT
Start: 2022-08-01 | End: 2022-08-01

## 2022-08-01 RX ORDER — HYDROCODONE BITARTRATE AND ACETAMINOPHEN 5; 325 MG/1; MG/1
1 TABLET ORAL EVERY 6 HOURS PRN
Qty: 10 TABLET | Refills: 0 | Status: SHIPPED | OUTPATIENT
Start: 2022-08-01 | End: 2024-02-03

## 2022-08-01 RX ORDER — BUPIVACAINE HCL/EPINEPHRINE 0.25-.0005
VIAL (ML) INJECTION
Status: DISCONTINUED | OUTPATIENT
Start: 2022-08-01 | End: 2022-08-01 | Stop reason: HOSPADM

## 2022-08-01 RX ORDER — ACETAMINOPHEN 10 MG/ML
INJECTION, SOLUTION INTRAVENOUS
Status: DISCONTINUED | OUTPATIENT
Start: 2022-08-01 | End: 2022-08-01

## 2022-08-01 RX ORDER — SODIUM CHLORIDE 9 MG/ML
INJECTION, SOLUTION INTRAVENOUS CONTINUOUS
Status: DISCONTINUED | OUTPATIENT
Start: 2022-08-01 | End: 2022-08-01 | Stop reason: HOSPADM

## 2022-08-01 RX ORDER — CEFAZOLIN SODIUM 2 G/50ML
2 SOLUTION INTRAVENOUS
Status: COMPLETED | OUTPATIENT
Start: 2022-08-01 | End: 2022-08-01

## 2022-08-01 RX ORDER — DEXAMETHASONE SODIUM PHOSPHATE 4 MG/ML
INJECTION, SOLUTION INTRA-ARTICULAR; INTRALESIONAL; INTRAMUSCULAR; INTRAVENOUS; SOFT TISSUE
Status: DISCONTINUED | OUTPATIENT
Start: 2022-08-01 | End: 2022-08-01

## 2022-08-01 RX ORDER — HEPARIN 100 UNIT/ML
SYRINGE INTRAVENOUS
Status: DISCONTINUED | OUTPATIENT
Start: 2022-08-01 | End: 2022-08-01 | Stop reason: HOSPADM

## 2022-08-01 RX ORDER — HYDROMORPHONE HYDROCHLORIDE 2 MG/ML
0.2 INJECTION, SOLUTION INTRAMUSCULAR; INTRAVENOUS; SUBCUTANEOUS EVERY 5 MIN PRN
Status: ACTIVE | OUTPATIENT
Start: 2022-08-01

## 2022-08-01 RX ORDER — OXYCODONE HYDROCHLORIDE 5 MG/1
5 TABLET ORAL
Status: ACTIVE | OUTPATIENT
Start: 2022-08-01

## 2022-08-01 RX ORDER — MIDAZOLAM HYDROCHLORIDE 1 MG/ML
INJECTION, SOLUTION INTRAMUSCULAR; INTRAVENOUS
Status: DISCONTINUED | OUTPATIENT
Start: 2022-08-01 | End: 2022-08-01

## 2022-08-01 RX ORDER — LIDOCAINE HYDROCHLORIDE 20 MG/ML
INJECTION INTRAVENOUS
Status: DISCONTINUED | OUTPATIENT
Start: 2022-08-01 | End: 2022-08-01

## 2022-08-01 RX ORDER — SODIUM CHLORIDE, SODIUM LACTATE, POTASSIUM CHLORIDE, CALCIUM CHLORIDE 600; 310; 30; 20 MG/100ML; MG/100ML; MG/100ML; MG/100ML
INJECTION, SOLUTION INTRAVENOUS CONTINUOUS
Status: DISPENSED | OUTPATIENT
Start: 2022-08-01

## 2022-08-01 RX ORDER — PROPOFOL 10 MG/ML
VIAL (ML) INTRAVENOUS
Status: DISCONTINUED | OUTPATIENT
Start: 2022-08-01 | End: 2022-08-01

## 2022-08-01 RX ORDER — ONDANSETRON 2 MG/ML
INJECTION INTRAMUSCULAR; INTRAVENOUS
Status: DISCONTINUED | OUTPATIENT
Start: 2022-08-01 | End: 2022-08-01

## 2022-08-01 RX ADMIN — CEFAZOLIN SODIUM 2 G: 1 INJECTION, POWDER, FOR SOLUTION INTRAMUSCULAR; INTRAVENOUS at 12:08

## 2022-08-01 RX ADMIN — PROPOFOL 150 MG: 10 INJECTION, EMULSION INTRAVENOUS at 12:08

## 2022-08-01 RX ADMIN — MIDAZOLAM HYDROCHLORIDE 1 MG: 1 INJECTION, SOLUTION INTRAMUSCULAR; INTRAVENOUS at 12:08

## 2022-08-01 RX ADMIN — ACETAMINOPHEN 1000 MG: 10 INJECTION, SOLUTION INTRAVENOUS at 12:08

## 2022-08-01 RX ADMIN — DEXAMETHASONE SODIUM PHOSPHATE 4 MG: 4 INJECTION, SOLUTION INTRAMUSCULAR; INTRAVENOUS at 12:08

## 2022-08-01 RX ADMIN — FENTANYL CITRATE 50 MCG: 50 INJECTION, SOLUTION INTRAMUSCULAR; INTRAVENOUS at 12:08

## 2022-08-01 RX ADMIN — SODIUM CHLORIDE, SODIUM LACTATE, POTASSIUM CHLORIDE, AND CALCIUM CHLORIDE: .6; .31; .03; .02 INJECTION, SOLUTION INTRAVENOUS at 11:08

## 2022-08-01 RX ADMIN — LIDOCAINE HYDROCHLORIDE 80 MG: 20 INJECTION, SOLUTION INTRAVENOUS at 12:08

## 2022-08-01 RX ADMIN — ONDANSETRON 4 MG: 2 INJECTION, SOLUTION INTRAMUSCULAR; INTRAVENOUS at 12:08

## 2022-08-01 RX ADMIN — GLYCOPYRROLATE 0.2 MG: 0.2 INJECTION, SOLUTION INTRAMUSCULAR; INTRAVITREAL at 12:08

## 2022-08-01 NOTE — DISCHARGE INSTRUCTIONS
WOUND CARE    After Surgery    DO:  May shower in 48 hours.  Minimal activity for 48 hours.  May remove outer dressing in 48 hours. If steri-strips are present, leave them in place. If they get wet, pat them dry. Steri-strips will fall off on their own. Do not pull them off.  Advance diet as tolerated.  Resume home medications as prescribed.    DO NOT:  Do not soak in a tub or pool until directed by your physician.  Do not drive for 24 hours or while taking narcotic pain medication.  Do not take additional tylenol/acetaminophen while taking narcotic pain medication that contains tylenol/acetaminophen.     CALL PHYSICIAN FOR:  Redness, swelling or bleeding.  Fever greater than 101.  Drainage from the incision site that appears infected.  Persistent pain not relieved by pain medication.    RETURN APPOINTMENT: Call to schedule appointment in 10-14 days.    FOR EMERGENCIES: Contact your doctor at [phone].

## 2022-08-01 NOTE — H&P
Patient ID: Rosa Aceves is a 81 y.o. female.     Chief Complaint: No chief complaint on file.        HPI  81-year-old female with recently diagnosed invasive ductal carcinoma of the left left breast in the upper outer quadrant.  This was identified on screening mammogram.  Patient has a history of right breast cancer for which she underwent a modified radical mastectomy 25-30 years ago.  She has done well since that time.  She does have some breast tenderness but no other complaints.  There is no nipple discharge.  Family history is significant for a grandmother mother and granddaughter positive for breast cancer.  The granddaughter has had genetic testing and is BRCA negative.  She is seen today in the Comprehensive multidisciplinary breast clinic in conjunction with Oncology and Radiation Oncology.    She is now S/P left mastectomy. She was referred back for portacath placement by Oncology.          Past Medical History:   Diagnosis Date    Aortic valve regurgitation      Benign paroxysmal vertigo, bilateral      Chronic kidney disease, unspecified      Coronary artery disease      Essential (primary) hypertension      Malignant neoplasm of right breast      Mitral valve regurgitation      Obesity, unspecified      Osteopenia      Positive colorectal cancer screening using Cologuard test      Rheumatoid arthritis, unspecified      Ruptured lumbar disc      Unspecified cataract              Past Surgical History:   Procedure Laterality Date    BREAST BIOPSY Left      BREAST SURGERY Left      CHOLECYSTECTOMY        EYE SURGERY        MASTECTOMY, RADICAL Right      TUBAL LIGATION                Current Outpatient Medications:     furosemide (LASIX) 40 MG tablet, Take 40 mg by mouth once daily., Disp: , Rfl:     olmesartan-amLODIPin-hcthiazid 40-5-25 mg Tab, Take 1 tablet by mouth Daily., Disp: , Rfl:     ondansetron (ZOFRAN) 4 MG tablet, Take 4 mg by mouth once., Disp: , Rfl:     traMADoL  (ULTRAM) 50 mg tablet, Take 50 mg by mouth once daily., Disp: , Rfl:      Review of patient's allergies indicates:  No Known Allergies           Family History   Problem Relation Age of Onset    Cancer Mother      Heart failure Father      Cancer Sister      Cancer Grandchild        Social History               Socioeconomic History    Marital status:     Number of children: 5   Tobacco Use    Smoking status: Never Smoker    Smokeless tobacco: Never Used   Substance and Sexual Activity    Alcohol use: Never    Drug use: Never    Sexual activity: Not Currently       Partners: Male            Review of Systems   HENT: Negative.    Respiratory: Negative.    Cardiovascular: Negative.    Gastrointestinal: Negative.       Objective:      Physical Exam  Constitutional:       General: She is not in acute distress.     Appearance: She is well-developed.   HENT:      Head: Normocephalic and atraumatic.   Eyes:      General: No scleral icterus.     Conjunctiva/sclera: Conjunctivae normal.      Pupils: Pupils are equal, round, and reactive to light.   Neck:      Thyroid: No thyromegaly.   Cardiovascular:      Rate and Rhythm: Normal rate and regular rhythm.      Heart sounds: No murmur heard.  Pulmonary:      Effort: Pulmonary effort is normal.      Breath sounds: Normal breath sounds. No wheezing or rales.      Comments: Left Breast Exam:  Well healed mastectomy incision.    She is post right modified radical mastectomy.  There were no complications from that procedure.  She has no lymphedema of the right arm.  Incision is well healed.  Abdominal:      General: Bowel sounds are normal. There is no distension.      Palpations: Abdomen is soft. There is no mass.      Tenderness: There is no abdominal tenderness.      Hernia: No hernia is present.   Musculoskeletal:         General: Normal range of motion.      Cervical back: Normal range of motion.   Lymphadenopathy:      Cervical: No cervical adenopathy.    Skin:     General: Skin is warm and dry.      Findings: No erythema or rash.   Neurological:      Mental Status: She is alert and oriented to person, place, and time.   Psychiatric:         Behavior: Behavior normal.         Assessment:           Encounter Diagnosis   Name Primary?    Malignant neoplasm of central portion of left breast in female, estrogen receptor negative Yes         Plan:      1. Plan portacath placment.  2. Risks and benefits of the planned procedure were discussed at length with the patient.  Risks and benefits of not proceeding with the procedure were discussed as well. All questions were answered. The patient expressed clear understanding and would like to proceed with the procedure as discussed.

## 2022-08-01 NOTE — OP NOTE
PATIENT NAME:  Rosa Aceves     DATE OF PROCEDURE: 8/1/2022    PROCEDURE:  Port-A-Cath placement with Fluoroscopy.    PREOPERATIVE DIAGNOSIS:  Breast cancer    POSTOPERATIVE DIAGNOSIS:  Same    SURGEON:  Renzo Jim M.D.  ASSISTANT: none    DESCRIPTION OF PROCEDURE:  After appropriate consent was obtained, consent forms   signed and questions answered, the patient was taken to the operating room and   placed on the operating room table where general anesthesia was applied.    Preoperative antibiotics were administered.  Timeout procedure was performed.    SCDs were in place.  The chest and neck were prepped and draped in the usual   sterile fashion.  The patient was placed in Trendelenburg and the right   subclavian vein was cannulated via the Seldinger technique.  The wire was   confirmed in position under fluoroscopy.  A skin incision was then made and a   subcutaneous pocket was created.  The port was assembled and then secured within   the pocket with 2-0 Vicryl suture.  The catheter was then tunneled to the   insertion site and cut to the appropriate length.  The dilator and introducer   were then inserted over the wire under fluoroscopy and the catheter was inserted   through the introducer sheath, which was peeled away.  The port flushed and   aspirated easily after placement.  It was in good position under fluoroscopy.    Subcutaneous tissues were reapproximated with a running 3-0 Vicryl suture.    Then, 0.25% Marcaine was injected for local anesthetic.  Skin was then closed   with 4-0 Monocryl subcuticular stitches.  Steri-Strips were applied. The patient was awakened and   transferred to the recovery room in a stable condition.  She tolerated the   procedure without complication. Post procedure chest xray was ordered in the recovery room.  Counts were correct at the end of the procedure.    EBL: 5 cc  Specimen: none  Complications: None

## 2022-08-01 NOTE — ANESTHESIA PROCEDURE NOTES
LMA insertion    Date/Time: 8/1/2022 12:12 PM  Performed by: Chasity Michel CRNA  Authorized by: Irwin Russo MD     Intubation:     Induction:  Intravenous    Intubated:  Postinduction    Mask Ventilation:  N/a    Attempts:  1    Attempted By:  CRNA    Difficult Airway Encountered?: No      Complications:  None    Airway Device:  Supraglottic airway/LMA    Airway Device Size:  4.0    Style/Cuff Inflation:  Cuffed (inflated to minimal occlusive pressure)    Secured at:  The lips    Placement Verified By:  Capnometry    Complicating Factors:  None    Findings Post-Intubation:  BS equal bilateral

## 2022-08-01 NOTE — ANESTHESIA POSTPROCEDURE EVALUATION
Anesthesia Post Evaluation    Patient: Rosa Aceves    Procedure(s) Performed: Procedure(s) (LRB):  [id number] (N/A)    Final Anesthesia Type: general      Patient location during evaluation: PACU  Patient participation: Yes- Able to Participate  Level of consciousness: sedated and awake  Post-procedure vital signs: reviewed and stable  Pain management: adequate  Airway patency: patent    PONV status at discharge: No PONV  Anesthetic complications: no      Cardiovascular status: hypertensive and blood pressure returned to baseline  Respiratory status: spontaneous ventilation  Hydration status: euvolemic  Follow-up not needed.          Vitals Value Taken Time   /74 08/01/22 1305   Temp 36.7 °C (98 °F) 08/01/22 1255   Pulse 85 08/01/22 1305   Resp 18 08/01/22 1305   SpO2 100 % 08/01/22 1305         No case tracking events are documented in the log.      Pain/Yaneth Score: Yaneth Score: 10 (8/1/2022 12:55 PM)

## 2022-08-01 NOTE — ANESTHESIA PREPROCEDURE EVALUATION
08/01/2022  Rosa Aceves is a 81 y.o., female.      Pre-op Assessment    I have reviewed the Patient Summary Reports.     I have reviewed the Nursing Notes. I have reviewed the NPO Status.   I have reviewed the Medications.     Review of Systems  Anesthesia Hx:  No problems with previous Anesthesia    Social:  Non-Smoker    Hematology/Oncology:         -- Anemia: Hematology Comments: H/H 10/31 Current/Recent Cancer. (left breast )  --  Cancer in past history (right breast s/p resection w/nodes):  Breast surgery and chemotherapy    Cardiovascular:   Hypertension, well controlled Valvular problems/Murmurs (AR, MR) CAD asymptomatic  ECG has been reviewed.    Pulmonary:  Pulmonary Normal    Renal/:   Chronic Renal Disease, CRI    Musculoskeletal:   Arthritis   Spine Disorders: lumbar    Neurological:  Neurology Normal    Endocrine:  Endocrine Normal  Obesity / BMI > 30      Physical Exam  General: Well nourished, Cooperative, Alert and Oriented    Airway:  Mallampati: I   Mouth Opening: Normal  Neck ROM: Normal ROM    Dental:  Intact    Chest/Lungs:  Clear to auscultation, Normal Respiratory Rate    Heart:  Rate: Normal  Rhythm: Regular Rhythm        Anesthesia Plan  Type of Anesthesia, risks & benefits discussed:    Anesthesia Type: Gen Supraglottic Airway, MAC  Intra-op Monitoring Plan: Standard ASA Monitors  Post Op Pain Control Plan: multimodal analgesia and IV/PO Opioids PRN  Induction:  IV  Airway Plan: Direct, Video and Fiberoptic, Post-Induction  Informed Consent: Informed consent signed with the Patient and all parties understand the risks and agree with anesthesia plan.  All questions answered.   ASA Score: 3    Ready For Surgery From Anesthesia Perspective.     .

## 2022-08-01 NOTE — PLAN OF CARE
Per verbal from Dr. lazaro x-ray of the chest wall and placement of radha cath is in place, no abnormalities noted.

## 2022-08-01 NOTE — DISCHARGE SUMMARY
Discharge Summary  General Surgery      Admit Date: 8/1/2022    Discharge Date :8/1/2022    Attending Physician: Renzo Jim     Discharge Physician: Renzo Jim    Discharged Condition: good    Discharge Diagnosis: Malignant neoplasm of central portion of left breast in female, estrogen receptor negative [C50.112, Z17.1]    Treatments/Procedures: Procedure(s) (LRB):  [id number] (N/A)    Hospital Course: Uneventful; Discharged home from Recovery    Significant Diagnostic Studies: none    Disposition: Home or Self Care    Diet: Regular    Follow up: Office 10-14 days    Activity: No heavy lifting till seen in office.    Patient Instructions:   Current Discharge Medication List      START taking these medications    Details   HYDROcodone-acetaminophen (NORCO) 5-325 mg per tablet Take 1 tablet by mouth every 6 (six) hours as needed for Pain.  Qty: 10 tablet, Refills: 0    Comments: n/a          CONTINUE these medications which have NOT CHANGED    Details   furosemide (LASIX) 40 MG tablet Take 40 mg by mouth once daily.      olmesartan-amLODIPin-hcthiazid 40-5-25 mg Tab Take 1 tablet by mouth Daily.      ondansetron (ZOFRAN) 4 MG tablet Take 4 mg by mouth once.      traMADoL (ULTRAM) 50 mg tablet Take 50 mg by mouth once daily.             Discharge Procedure Orders   Diet general

## 2022-08-01 NOTE — TRANSFER OF CARE
"Anesthesia Transfer of Care Note    Patient: Rosa Aceves    Procedure(s) Performed: Procedure(s) (LRB):  [id number]-[id number] (N/A)    Patient location: PACU    Anesthesia Type: general    Transport from OR: Transported from OR on 2-3 L/min O2 by NC with adequate spontaneous ventilation    Post pain: adequate analgesia    Post assessment: tolerated procedure well and no apparent anesthetic complications    Post vital signs: stable    Level of consciousness: responds to stimulation    Nausea/Vomiting: no nausea/vomiting    Complications: none    Transfer of care protocol was followed      Last vitals:   Visit Vitals  BP (!) 158/70 (BP Location: Right arm, Patient Position: Lying)   Pulse 72   Temp 36.7 °C (98 °F) (Skin)   Resp 18   Ht 5' 5" (1.651 m)   Wt 80.3 kg (177 lb)   SpO2 97%   Breastfeeding No   BMI 29.45 kg/m²     "

## 2022-08-02 ENCOUNTER — TELEPHONE (OUTPATIENT)
Dept: HEMATOLOGY/ONCOLOGY | Facility: CLINIC | Age: 82
End: 2022-08-02

## 2022-08-02 ENCOUNTER — OFFICE VISIT (OUTPATIENT)
Dept: HEMATOLOGY/ONCOLOGY | Facility: CLINIC | Age: 82
End: 2022-08-02
Payer: MEDICARE

## 2022-08-02 VITALS
TEMPERATURE: 98 F | SYSTOLIC BLOOD PRESSURE: 150 MMHG | DIASTOLIC BLOOD PRESSURE: 67 MMHG | WEIGHT: 179 LBS | HEART RATE: 75 BPM | HEIGHT: 63 IN | RESPIRATION RATE: 18 BRPM | BODY MASS INDEX: 31.71 KG/M2

## 2022-08-02 DIAGNOSIS — T45.1X5A CHEMOTHERAPY INDUCED NEUTROPENIA: Primary | ICD-10-CM

## 2022-08-02 DIAGNOSIS — D70.1 CHEMOTHERAPY INDUCED NEUTROPENIA: Primary | ICD-10-CM

## 2022-08-02 DIAGNOSIS — C50.919 TRIPLE NEGATIVE MALIGNANT NEOPLASM OF BREAST: Primary | ICD-10-CM

## 2022-08-02 DIAGNOSIS — C50.919 TRIPLE NEGATIVE MALIGNANT NEOPLASM OF BREAST: ICD-10-CM

## 2022-08-02 PROCEDURE — 1159F PR MEDICATION LIST DOCUMENTED IN MEDICAL RECORD: ICD-10-PCS | Mod: CPTII,S$GLB,, | Performed by: NURSE PRACTITIONER

## 2022-08-02 PROCEDURE — 1159F MED LIST DOCD IN RCRD: CPT | Mod: CPTII,S$GLB,, | Performed by: NURSE PRACTITIONER

## 2022-08-02 PROCEDURE — 3288F FALL RISK ASSESSMENT DOCD: CPT | Mod: CPTII,S$GLB,, | Performed by: NURSE PRACTITIONER

## 2022-08-02 PROCEDURE — 3078F PR MOST RECENT DIASTOLIC BLOOD PRESSURE < 80 MM HG: ICD-10-PCS | Mod: CPTII,S$GLB,, | Performed by: NURSE PRACTITIONER

## 2022-08-02 PROCEDURE — 99215 OFFICE O/P EST HI 40 MIN: CPT | Mod: S$GLB,,, | Performed by: NURSE PRACTITIONER

## 2022-08-02 PROCEDURE — 1101F PR PT FALLS ASSESS DOC 0-1 FALLS W/OUT INJ PAST YR: ICD-10-PCS | Mod: CPTII,S$GLB,, | Performed by: NURSE PRACTITIONER

## 2022-08-02 PROCEDURE — 3077F SYST BP >= 140 MM HG: CPT | Mod: CPTII,S$GLB,, | Performed by: NURSE PRACTITIONER

## 2022-08-02 PROCEDURE — 1101F PT FALLS ASSESS-DOCD LE1/YR: CPT | Mod: CPTII,S$GLB,, | Performed by: NURSE PRACTITIONER

## 2022-08-02 PROCEDURE — 99215 PR OFFICE/OUTPT VISIT, EST, LEVL V, 40-54 MIN: ICD-10-PCS | Mod: S$GLB,,, | Performed by: NURSE PRACTITIONER

## 2022-08-02 PROCEDURE — 1126F PR PAIN SEVERITY QUANTIFIED, NO PAIN PRESENT: ICD-10-PCS | Mod: CPTII,S$GLB,, | Performed by: NURSE PRACTITIONER

## 2022-08-02 PROCEDURE — 1126F AMNT PAIN NOTED NONE PRSNT: CPT | Mod: CPTII,S$GLB,, | Performed by: NURSE PRACTITIONER

## 2022-08-02 PROCEDURE — 3077F PR MOST RECENT SYSTOLIC BLOOD PRESSURE >= 140 MM HG: ICD-10-PCS | Mod: CPTII,S$GLB,, | Performed by: NURSE PRACTITIONER

## 2022-08-02 PROCEDURE — 3078F DIAST BP <80 MM HG: CPT | Mod: CPTII,S$GLB,, | Performed by: NURSE PRACTITIONER

## 2022-08-02 PROCEDURE — 3288F PR FALLS RISK ASSESSMENT DOCUMENTED: ICD-10-PCS | Mod: CPTII,S$GLB,, | Performed by: NURSE PRACTITIONER

## 2022-08-02 RX ORDER — ONDANSETRON HYDROCHLORIDE 8 MG/1
8 TABLET, FILM COATED ORAL EVERY 8 HOURS PRN
Qty: 30 TABLET | Refills: 2 | Status: SHIPPED | OUTPATIENT
Start: 2022-08-02 | End: 2023-08-02

## 2022-08-02 RX ORDER — HYDROCODONE BITARTRATE AND ACETAMINOPHEN 10; 325 MG/15ML; MG/15ML
SOLUTION ORAL
COMMUNITY
Start: 2022-05-27 | End: 2024-02-03

## 2022-08-02 RX ORDER — LIDOCAINE AND PRILOCAINE 25; 25 MG/G; MG/G
CREAM TOPICAL
Qty: 5 G | Refills: 2 | Status: SHIPPED | OUTPATIENT
Start: 2022-08-02 | End: 2024-02-03

## 2022-08-02 RX ORDER — DEXAMETHASONE 4 MG/1
TABLET ORAL
Qty: 120 TABLET | Refills: 1 | Status: SHIPPED | OUTPATIENT
Start: 2022-08-02 | End: 2024-02-01

## 2022-08-02 NOTE — PROGRESS NOTES
[facility] HEMATOLGY ONCOLOGY     Subjective:       Patient ID: Rosa Aceves is a 81 y.o. female presents today for chemotherapy education.      Chief Complaint:   Breast Cancer - triple negative     HPI  The patient is here today with her daughter for chemotherapy education on Carboplatin and Taxotere.     She just had a PAC placed on her right chest wall.   Family history of Breast Cancer will consider BRCA testing.   Patient has had a right and left mastectomy, left is the most recent.    Oncology History   Malignant neoplasm of central portion of left breast in female, estrogen receptor negative   5/19/2022 Initial Diagnosis    Malignant neoplasm of central portion of left breast in female, estrogen receptor negative     8/4/2022 -  Chemotherapy    Treatment Summary   Plan Name: OP BREAST DOCETAXEL CARBOPLATIN TRASTUZUMAB (TCH) Q3W  Treatment Goal: Curative  Status: Active  Start Date: 8/4/2022 (Planned)  End Date: 11/17/2022 (Planned)  Provider: YOUNG Jim MD  Chemotherapy: dexAMETHasone (DECADRON) 4 MG Tab, 8 mg, Oral, 2 times daily, 0 of 1 cycle, Start date: --, End date: --  CARBOplatin (PARAPLATIN) 430 mg in sodium chloride 0.9% 250 mL chemo infusion, 430 mg (100 % of original dose 430.8 mg), Intravenous, Clinic/HOD 1 time, 0 of 6 cycles  Dose modification:   (original dose 430.8 mg, Cycle 1, Reason: MD Discretion)  DOCEtaxeL (TAXOTERE) 75 mg/m2 = 140 mg in sodium chloride 0.9% 250 mL chemo infusion, 75 mg/m2 = 140 mg, Intravenous, Clinic/HOD 1 time, 0 of 6 cycles         Past Medical History:   Diagnosis Date    Aortic valve regurgitation     Benign paroxysmal vertigo, bilateral     Chronic kidney disease, unspecified     Coronary artery disease     Essential (primary) hypertension     Malignant neoplasm of right breast     Mitral valve regurgitation     Obesity, unspecified     Osteopenia     Positive colorectal cancer screening using Cologuard test     Rheumatoid arthritis, unspecified      Ruptured lumbar disc     Unspecified cataract        Past Surgical History:   Procedure Laterality Date    BREAST BIOPSY Left     BREAST SURGERY Left     CHOLECYSTECTOMY      EYE SURGERY      INJECTION FOR SENTINEL NODE IDENTIFICATION Left 5/26/2022    Procedure: INJECTION, FOR SENTINEL NODE IDENTIFICATION;  Surgeon: Renzo Jim MD;  Location: [facility] OR;  Service: General;  Laterality: Left;    INSERTION OF TUNNELED CENTRAL VENOUS CATHETER (CVC) WITH SUBCUTANEOUS PORT N/A 8/1/2022    Procedure: [id number];  Surgeon: Renzo Jim MD;  Location: [facility] OR;  Service: General;  Laterality: N/A;    MASTECTOMY Left 5/26/2022    Procedure: MASTECTOMY;  Surgeon: Renzo Jim MD;  Location: [facility] OR;  Service: General;  Laterality: Left;    MASTECTOMY, RADICAL Right     SENTINEL LYMPH NODE BIOPSY Left 5/26/2022    Procedure: BIOPSY, LYMPH NODE, SENTINEL;  Surgeon: Renzo Jim MD;  Location: [facility] OR;  Service: General;  Laterality: Left;    TUBAL LIGATION         Social History     Socioeconomic History    Marital status:     Number of children: 5   Tobacco Use    Smoking status: Never Smoker    Smokeless tobacco: Never Used   Substance and Sexual Activity    Alcohol use: Never    Drug use: Never    Sexual activity: Not Currently     Partners: Male       Family History   Problem Relation Age of Onset    Cancer Mother     Heart failure Father     Cancer Sister     Cancer Grandchild        Review of patient's allergies indicates:  No Known Allergies      Current Outpatient Medications:     furosemide (LASIX) 40 MG tablet, Take 40 mg by mouth once daily., Disp: , Rfl:     HYDROcodone-acetaminophen (NORCO) 5-325 mg per tablet, Take 1 tablet by mouth every 6 (six) hours as needed for Pain., Disp: 10 tablet, Rfl: 0    HYDROcodone-acetaminophen  mg/15 mL(15 mL) Soln, 1 tablet EVERY 6 HOURS (route: oral), Disp: , Rfl:     olmesartan-amLODIPin-hcthiazid 40-5-25  mg Tab, Take 1 tablet by mouth Daily., Disp: , Rfl:     ondansetron (ZOFRAN) 4 MG tablet, Take 4 mg by mouth once., Disp: , Rfl:     traMADoL (ULTRAM) 50 mg tablet, Take 50 mg by mouth once daily., Disp: , Rfl:     dexAMETHasone (DECADRON) 4 MG Tab, Take 8mg the AM and PM before chemotherapy and the AM and PM the day after chemotherapy. Repeat every 21 days., Disp: 120 tablet, Rfl: 1    LIDOcaine-prilocaine (EMLA) cream, Apply topically as needed (once before port a cath is accessed)., Disp: 5 g, Rfl: 2    ondansetron (ZOFRAN) 8 MG tablet, Take 1 tablet (8 mg total) by mouth every 8 (eight) hours as needed for Nausea., Disp: 30 tablet, Rfl: 2  No current facility-administered medications for this visit.    Facility-Administered Medications Ordered in Other Visits:     electrolyte-S (ISOLYTE), , Intravenous, Continuous, Irwin Russo MD    fentaNYL 50 mcg/mL injection 25 mcg, 25 mcg, Intravenous, Q5 Min PRN, Iwrin Russo MD    HYDROmorphone (PF) injection 0.2 mg, 0.2 mg, Intravenous, Q5 Min PRN, Irwin Russo MD    lactated ringers infusion, , Intravenous, Continuous, Irwin Russo MD, Last Rate: 10 mL/hr at 08/01/22 1120, Restarted at 08/01/22 1234    LIDOcaine (PF) 10 mg/ml (1%) injection 10 mg, 1 mL, Intradermal, Once, Irwin Russo MD    oxyCODONE immediate release tablet 5 mg, 5 mg, Oral, Q3H PRN, Irwin Russo MD    All medications and past history have been reviewed.    Review of Systems   Constitutional: Negative for activity change, appetite change, fatigue and fever.   HENT: Negative for congestion, mouth sores, postnasal drip, rhinorrhea, sinus pressure, sore throat and trouble swallowing.    Eyes: Negative for photophobia and visual disturbance.   Respiratory: Negative for cough, chest tightness, shortness of breath and wheezing.    Cardiovascular: Negative for chest pain and leg swelling.   Gastrointestinal: Negative for abdominal distention, abdominal pain, constipation,  "diarrhea, nausea and vomiting.   Endocrine: Negative for cold intolerance.   Genitourinary: Negative for decreased urine volume, dysuria and frequency.   Musculoskeletal: Positive for arthralgias and myalgias.   Skin: Negative for pallor and rash.        Post right chest wall pac insertion      Allergic/Immunologic: Negative for immunocompromised state.   Neurological: Negative for dizziness, syncope, weakness, light-headedness, numbness and headaches.   Hematological: Negative for adenopathy. Does not bruise/bleed easily.   Psychiatric/Behavioral: Negative for sleep disturbance. The patient is not nervous/anxious.        Objective:        Physcial Examination  VITAL SIGNS:    Body surface area is 1.9 meters squared.   Pain Assessment  Vitals:    08/02/22 1447   BP: (!) 150/67   Pulse: 75   Resp: 18   Temp: 98.1 °F (36.7 °C)   Weight: 81.2 kg (179 lb)   Height: 5' 3" (1.6 m)   PainSc: 0-No pain          Wt Readings from Last 5 Encounters:   08/02/22 81.2 kg (179 lb)   07/27/22 80.3 kg (177 lb)   07/21/22 78 kg (171 lb 14.4 oz)   07/18/22 78.5 kg (173 lb)   06/22/22 82.1 kg (181 lb)       Physical Exam  Constitutional:       Appearance: Normal appearance.   HENT:      Head: Normocephalic and atraumatic.      Mouth/Throat:      Mouth: Mucous membranes are moist.   Cardiovascular:      Rate and Rhythm: Normal rate and regular rhythm.      Pulses: Normal pulses.      Heart sounds: Normal heart sounds.   Pulmonary:      Effort: Pulmonary effort is normal. No respiratory distress.      Breath sounds: Normal breath sounds. No wheezing.   Chest:   Breasts:      Right: Absent.      Left: Absent.        Comments: Double mastectomy    Abdominal:      General: There is no distension.      Palpations: Abdomen is soft. There is no mass.      Tenderness: There is no abdominal tenderness.   Musculoskeletal:         General: No swelling. Normal range of motion.      Right lower leg: No edema.      Left lower leg: No edema.      " Comments: Uses a walker to ambulate     Skin:     General: Skin is warm and dry.      Capillary Refill: Capillary refill takes 2 to 3 seconds.      Findings: No bruising or rash.      Comments: Incision from PAC insertion  Right chest wall   Neurological:      Mental Status: She is alert and oriented to person, place, and time. Mental status is at baseline.      Motor: No weakness.   Psychiatric:         Mood and Affect: Mood normal.         Behavior: Behavior normal.              Laboratory and Radiology   Lab Results   Component Value Date    WBC 9.10 05/27/2022    RBC 3.16 (L) 05/27/2022    HGB 10.0 (L) 05/27/2022    HCT 31.4 (L) 05/27/2022    MCV 99 (H) 05/27/2022    MCH 31.6 (H) 05/27/2022    MCHC 31.8 (L) 05/27/2022    RDW 12.6 05/27/2022     05/27/2022    MPV 10.8 05/27/2022     BMP  Lab Results   Component Value Date     05/27/2022    K 4.5 05/27/2022     (H) 05/27/2022    CO2 23 05/27/2022    BUN 28 (H) 05/27/2022    CREATININE 1.2 05/27/2022    CALCIUM 8.4 (L) 05/27/2022    ANIONGAP 7 (L) 05/27/2022    ESTGFRAFRICA 50 (A) 05/27/2022    EGFRNONAA 43 (A) 05/27/2022     No results found for: ALT, AST, GGT, ALKPHOS, BILITOT  No results found for this or any previous visit (from the past 2160 hour(s)).  No results found for this or any previous visit (from the past 2160 hour(s)).  No results found for this or any previous visit (from the past 2160 hour(s)).    Pathology  Pathology Results  (Last 10 years)               05/26/22 1444  Specimen to Pathology, Surgery General Surgery Final result    Narrative:  Pre-op Diagnosis: Malignant neoplasm of central portion of   left breast in female, estrogen receptor negative [C50.112,   Z17.1]   Procedure(s):   MASTECTOMY   BIOPSY, LYMPH NODE, SENTINEL   Number of specimens:1   Name of specimens:   LEFT AXILLARY SENTINEL LYMPH NODE  *FROZEN*   Which provider would you like to cc?->YUMIKO VAZQUEZ   Release to patient->Immediate   Specimen total  (fresh, frozen, permanent):->1             All lab results and imaging results have been reviewed and discussed with the patient.        TITLE: PLAN OF CARE FOR THE CHEMOTHERAPY PATIENT / TEACHING PROTOCOL    PURPOSE: To involve the patient / significant other in the plan of care and to provide teaching to the significant other & patient receiving chemotherapy.    LEVEL: Independent.    CONTENT: The Plan of Care for the chemotherapy patient is individualized and appropriate to the patients needs, strengths, limitations, & goals.  Education includes information regarding chemotherapy side effects, the treatment itself, and self-care  Activities.    GOAL / OUTCOME STANDARDS    PHYSIOLOGIC: The client will remain free or experience minimal side effects or toxicities throughout the chemotherapy treatment period.     PSYCHOLOGIC: The client/significant others will demonstrate positive coping mechanisms in relation to chemotherapy and its side effects.      COGINITIVE: The client/significant others will verbalize understanding of self-care measure to avoid/minimize side effects of the chemotherapy regimen.    EVALUATION / COMMENT KEY:    V = Audiovisual/Video  S = Successfully meets outcome  N = Needs further instruction  NA = Not applicable to the patient  P = Previous knowledge  U = Unable to comprehend  * = See progress notes      PLAN OF CARE  INFORMATION TO BE DELIVERED / NURSING INTERVENTIONS DATE EVALUATION   1. Assessment of client/caregiver,          knowledge of cancer diagnosis,          and chemotherapy as a treatment.  1a. Evaluate patient/caregiver learning ability     b. Plan teaching sessions with patient/caregiver according to needs and present anxiety level/ability to learn.     c. Provide Chemotherapy Education Packet,         Mouth Care Protocol,          Specific Patient Education Sheets. 08/02/2022  S   2. Individual chemotherapy treatment          plan.  2a. Review of Chemotherapy Education handout  from [employer]              08/02/2022     S   3. Knowledge Deficit & Self-Management of general side effects common to all chemotherapy:  a. Nausea/Vomiting   b.   Diarrhea  c. Mouth Care  d. Dental care  e. Constipation  f. Hair Loss  g. Potential for infection  h. Fatigue   3a. Reinforce that the majority of side effects from chemotherapy are reversible and are  controlled both in the hospital and at home        (blood counts recover, hair grows back).   b.  Refer to the following for reinforcement of         information post-treatment:  1. Mouth Care Protocol.  2. Bowel Protocol for constipation or diarrhea.  3.  Drug Specific Chemotherapy Information Sheets for each medication patient receiving.    08/02/2022     S     PLAN OF CARE  INFORMATION TO BE DELIVERED / NURSING INTERVENTIONS DATE EVALUATION   h. Potential for bleeding         i. Potential anemia/fatigue         j. Potential sunburn         k. Birth control measures  l. Safety measures post treatment 4.  Chemotherapy Home Care Instruction  and Safety Information Sheet.  A. patient/caregivers to thoroughly cook shellfish (shrimp, crab, etc) to decrease the chance of infection.    B.  Use sunscreen and protective clothing while in the sun.   08/02/2022      4. Knowledge deficit & Self Management of Drug Specific  Side Effects.    a. BLADDER EFFECTS         (Hemorrhagic Cystitis)                     Preventable with adequate hydration; occurs 2-3 days or more post treatment.     1.  Instruct patient to:   a.   Void at least every 2 hours; increase intake.   b.   DO NOT hold urine; go when urge is felt.   c.    Empty bladder at bedtime and on          awakening.   d.   Observe for color changes (red to tea            colored), amount and frequency changes.   e.   Notify oncologist of any abnormalities           in urine or voiding or if you cannot               drink adequate fluids.    08/02/2022     S    b.   CHANGES IN URINE   COLOR:         1.    Instruct patient:   a.   Most evident in first 2-3 voidings after            administration.  b. Lasts less than 24 hours.  c. If urine is discolored 2 or more days post- treatment, notify oncologist.      08/02/2022 S   c.    KIDNEY EFFECTS           (Nephrotoxicity)   1.  Instruct patient to:  a.   Drink 8-16 glasses of fluid/day the day   pre-treatment and 3-4 days post-treatment to maintain hydration; the best way to minimize kidney problems.  b.   Notify oncologist immediately if unable to drink fluids or if changes are noted in urinary elimination.      08/02/2022     S   a. PULMONARY TOXICITY    1. Instruct patient to report symptoms such as shortness of breath, chest pain, shallow breathing, or chest wall discomfort to physician.  2. Reinforce preventative measures used by the health care team.  a. Baseline and periodic PFT and chest x-ray.   08/02/2022   S     PLAN OF CARE INFORMATION TO BE DELIVERED / NURSING INTERVENTIONS DATE EVALUATION   b. NERVE & MUSCLE EFFECTS (neurotoxocity; neuropathy, possible visual/hearing changes)        3. Instruct patient to:    a. Report numbness or tingling of the hands/feet, loss of fine motor movement (buttoning shirt, tying shoelaces), or gait changes to your oncologist.  b. If numbness/tingling are present:   protect feet with shoes at all times.   Use gloves for washing dishes/gardening & potholders in kitchen.       08/02/2022   S   c. CARDIOTOXICITY  Decreased effectiveness of             cardiac function. Effective are                  cumulative and irreversible.                                    CARDIAC ARRYTHMIAS              4   Instruct:  a. Heart function may be tested before treatment and perdiocally during treatment.  b. Notify oncologist of irregular pulse, palpitations, shortness of breath, or swelling in lower extremities/feet.          Taxol and Taxotere can cause arrhythmias on infusion that resolve once infusion discontinued. Instruct nurse if any  irregularity felt.    08/02/2022   S   d. EXTRAVASTION  Occurs when vesicants leak outside of vein and cause damage to the skin and underlying tissues.   1. Reinforce preventive measures used to avoid complications.  a. Fresh IV site or central line monitored continuously with vesicant IVP.  b. Continuous infusion via central line site and blood return monitored periodically around the clock.  2. Instruct to:  a. Notify nurse of any discomfort, burning, stinging, etc. at IV site during chemotherapy administration.  b. Notify oncologist of any redness, pain, or swelling at IV site after discharge from hospital.   08/02/2022   S   e. HYPERSENSITIVITY can happen with any medication.   1. Instruct patient:  a. Nurse is with them during the initial part of treatment and will be close by to monitor.  b. Pre-medication ordered by the oncologist must be taken on time. If doses are missed, treatment will need to be re-scheduled.  c. Skin redness, itching, or hives appearing after discharge should be reported to oncologist. 08/02/2022   S       PLAN OF CARE INFORMATION TO BE DELIVERED / NURSING INTERVENTIONS DATE EVALUATION   f. FLU-LIKE SYNDROME      1. Instruct patient symptoms are hard to prevent and may include fever, shaking chills, muscle and body aches.  a. Taking prescribed medications from physician if needed.  b. Adequate fluids are important.    2. Reinforce the need to call if temperature is         elevated to 100.4 or more  08/02/2022   S   g. HAND-FOOT SYNDROME  causes painful, symmetric swelling and redness of palms and soles                  5. Instruct patient to report any numbness or tingling in the hands or feet.  6. Explain prevention techniques, such as     a. Use heavy moisturizers to lessen skin dryness and itching, but to avoid if skin is cracked or broken  b. Bathe in tepid water, use non-perfumed soap, and wash gently. Baths with oatmeal or diluted baking soda may be soothing.  c. Avoid tight  fitting shoes and repetitive actions, such as rubbing hands or applying pressure to hands/feet.  7. Review measures to take should syndrome occur:  a. Cold compresses and elevation for          edema  b. Pain medications and other measures as ordered by oncologist.   4.   Syndrome resolves few weeks after therapy. 08/02/2022   S   5. DISCHARGE PLANNING /        EDUCATION 1.    Explain importance of compliance with follow- up  tests (CBC, CMP).  2.    Verify patient/caregiver know:  a.    Oncologists office phone number.  b.    Dates of follow-up appointments.  c.    Prescriptions given for nausea  3.   Review side effects to monitor and notify          oncologist about.  4.   Reinforce the need for patient and caregivers to:  a.    Review information given.  b.    Call oncologists office with questions  or symptoms  5.   Provide Cancer Resource Christine Brochure make referrals if needed for financial or .   08/02/2022   S     PROGRESS NOTES: I met with the patient Ms Aceves  today for chemotherapy education. she will be starting treatment with Taxotere and Carboplatin . We discussed the mechanism of action, potential side effects of this treatment as well as ways she can manage them at home. Some of these side effects include but or not limited to fever, nausea, vomiting, decreased appetite, fatigue, weakness, cytopenias, myalgia/arthralgia, constipation, diarrhea, bleeding, headache, shortness of breath, nail changes, taste change, hair thinning/loss, mood disturbances, or edema. We also discussed dietary modifications she should make although this will be discussed in more detail with the dietician. she was provided with anti-emetic medication, a copy of all of the information we discussed today as well as our contact information. she will be provided a schedule on his first day of treatment. We will obtain labs on a weekly basis and the patient will follow-up with the physician for toxicity  monitoring throughout treatment. All questions were answered and an informed consent was obtained. she was reminded to certainly contact us sooner if needed.  Attached to the patients folder and discussed with the patient the 24 hour/ 7 days a week after hours telephone number for the physician.  Patient notified to call anytime 24/7 because their is a physician on call for any problems that may arise.  Patient also notified to report to [facility] if they can not get in touch with a physician after hours.  Discussed the five wishes booklet with the patient and their family.           Assessment/Plan:       1. Triple negative malignant neoplasm of breast      Triple negative malignant neoplasm of breast  -     LIDOcaine-prilocaine (EMLA) cream; Apply topically as needed (once before port a cath is accessed).  Dispense: 5 g; Refill: 2  -     ondansetron (ZOFRAN) 8 MG tablet; Take 1 tablet (8 mg total) by mouth every 8 (eight) hours as needed for Nausea.  Dispense: 30 tablet; Refill: 2  -     dexAMETHasone (DECADRON) 4 MG Tab; Take 8mg the AM and PM before chemotherapy and the AM and PM the day after chemotherapy. Repeat every 21 days.  Dispense: 120 tablet; Refill: 1          I have explained and the patient understands all of  the current recommendation(s). I have answered all of their questions to the best of my ability and to their complete satisfaction.   The patient is to continue with the current management plan.          Medications Ordered:  Zofran 8mg 1 tab PO Q8h prn nausea  Emla cream: Apply to port site 30min prior to treatment and cover with saran wrap  Claritin 10mg PO QD day of chemotherapy and for 5 days after Neulasta to help prevent bone pain  Decadron 8mg the AM and PM the day before and the day after chemotherapy    Standing Labs Ordered:  CBC weekly  CMP weekly      Total Face to Face Time: 60 minutes face to face with the patient and their family discussing the chemotherapy side-effects  and when to call our office.   Electronically signed by: Electronically signed by: Rachel Dobson, MSN, APRN, AGNP-C

## 2022-08-04 ENCOUNTER — LAB VISIT (OUTPATIENT)
Dept: LAB | Facility: HOSPITAL | Age: 82
End: 2022-08-04
Attending: INTERNAL MEDICINE
Payer: MEDICARE

## 2022-08-04 ENCOUNTER — TELEPHONE (OUTPATIENT)
Dept: HEMATOLOGY/ONCOLOGY | Facility: CLINIC | Age: 82
End: 2022-08-04

## 2022-08-04 ENCOUNTER — OFFICE VISIT (OUTPATIENT)
Dept: HEMATOLOGY/ONCOLOGY | Facility: CLINIC | Age: 82
End: 2022-08-04
Payer: MEDICARE

## 2022-08-04 VITALS
SYSTOLIC BLOOD PRESSURE: 144 MMHG | WEIGHT: 178 LBS | BODY MASS INDEX: 31.54 KG/M2 | DIASTOLIC BLOOD PRESSURE: 71 MMHG | HEART RATE: 72 BPM | HEIGHT: 63 IN | TEMPERATURE: 99 F | RESPIRATION RATE: 18 BRPM

## 2022-08-04 DIAGNOSIS — C50.112 MALIGNANT NEOPLASM OF CENTRAL PORTION OF LEFT BREAST IN FEMALE, ESTROGEN RECEPTOR NEGATIVE: ICD-10-CM

## 2022-08-04 DIAGNOSIS — T45.1X5A CHEMOTHERAPY INDUCED NEUTROPENIA: ICD-10-CM

## 2022-08-04 DIAGNOSIS — Z17.1 MALIGNANT NEOPLASM OF CENTRAL PORTION OF LEFT BREAST IN FEMALE, ESTROGEN RECEPTOR NEGATIVE: ICD-10-CM

## 2022-08-04 DIAGNOSIS — D70.1 CHEMOTHERAPY INDUCED NEUTROPENIA: ICD-10-CM

## 2022-08-04 DIAGNOSIS — C50.112 MALIGNANT NEOPLASM OF CENTRAL PORTION OF LEFT BREAST IN FEMALE, ESTROGEN RECEPTOR NEGATIVE: Primary | ICD-10-CM

## 2022-08-04 DIAGNOSIS — Z17.1 MALIGNANT NEOPLASM OF CENTRAL PORTION OF LEFT BREAST IN FEMALE, ESTROGEN RECEPTOR NEGATIVE: Primary | ICD-10-CM

## 2022-08-04 LAB
ALBUMIN SERPL BCP-MCNC: 4.1 G/DL (ref 3.5–5.2)
ALP SERPL-CCNC: 64 U/L (ref 55–135)
ALT SERPL W/O P-5'-P-CCNC: 9 U/L (ref 10–44)
ANION GAP SERPL CALC-SCNC: 8 MMOL/L (ref 8–16)
AST SERPL-CCNC: 16 U/L (ref 10–40)
BASOPHILS # BLD AUTO: 0.06 K/UL (ref 0–0.2)
BASOPHILS NFR BLD: 0.5 % (ref 0–1.9)
BILIRUB SERPL-MCNC: 0.6 MG/DL (ref 0.1–1)
BUN SERPL-MCNC: 47 MG/DL (ref 8–23)
CALCIUM SERPL-MCNC: 9.4 MG/DL (ref 8.7–10.5)
CHLORIDE SERPL-SCNC: 106 MMOL/L (ref 95–110)
CO2 SERPL-SCNC: 27 MMOL/L (ref 23–29)
CREAT SERPL-MCNC: 1.3 MG/DL (ref 0.5–1.4)
DIFFERENTIAL METHOD: ABNORMAL
EOSINOPHIL # BLD AUTO: 0.2 K/UL (ref 0–0.5)
EOSINOPHIL NFR BLD: 1.6 % (ref 0–8)
ERYTHROCYTE [DISTWIDTH] IN BLOOD BY AUTOMATED COUNT: 13.2 % (ref 11.5–14.5)
EST. GFR  (NO RACE VARIABLE): 41.8 ML/MIN/1.73 M^2
GLUCOSE SERPL-MCNC: 126 MG/DL (ref 70–110)
HCT VFR BLD AUTO: 37.1 % (ref 37–48.5)
HGB BLD-MCNC: 11.9 G/DL (ref 12–16)
IMM GRANULOCYTES # BLD AUTO: 0.04 K/UL (ref 0–0.04)
IMM GRANULOCYTES NFR BLD AUTO: 0.3 % (ref 0–0.5)
LYMPHOCYTES # BLD AUTO: 1.9 K/UL (ref 1–4.8)
LYMPHOCYTES NFR BLD: 15.1 % (ref 18–48)
MCH RBC QN AUTO: 31.5 PG (ref 27–31)
MCHC RBC AUTO-ENTMCNC: 32.1 G/DL (ref 32–36)
MCV RBC AUTO: 98 FL (ref 82–98)
MONOCYTES # BLD AUTO: 1.2 K/UL (ref 0.3–1)
MONOCYTES NFR BLD: 9.4 % (ref 4–15)
NEUTROPHILS # BLD AUTO: 9 K/UL (ref 1.8–7.7)
NEUTROPHILS NFR BLD: 73.1 % (ref 38–73)
NRBC BLD-RTO: 0 /100 WBC
PLATELET # BLD AUTO: 260 K/UL (ref 150–450)
PMV BLD AUTO: 10.2 FL (ref 9.2–12.9)
POTASSIUM SERPL-SCNC: 4.5 MMOL/L (ref 3.5–5.1)
PROT SERPL-MCNC: 7.9 G/DL (ref 6–8.4)
RBC # BLD AUTO: 3.78 M/UL (ref 4–5.4)
SODIUM SERPL-SCNC: 141 MMOL/L (ref 136–145)
WBC # BLD AUTO: 12.29 K/UL (ref 3.9–12.7)

## 2022-08-04 PROCEDURE — 3288F FALL RISK ASSESSMENT DOCD: CPT | Mod: CPTII,S$GLB,, | Performed by: INTERNAL MEDICINE

## 2022-08-04 PROCEDURE — 1159F PR MEDICATION LIST DOCUMENTED IN MEDICAL RECORD: ICD-10-PCS | Mod: CPTII,S$GLB,, | Performed by: INTERNAL MEDICINE

## 2022-08-04 PROCEDURE — 3288F PR FALLS RISK ASSESSMENT DOCUMENTED: ICD-10-PCS | Mod: CPTII,S$GLB,, | Performed by: INTERNAL MEDICINE

## 2022-08-04 PROCEDURE — 86300 IMMUNOASSAY TUMOR CA 15-3: CPT | Mod: 91 | Performed by: INTERNAL MEDICINE

## 2022-08-04 PROCEDURE — 3077F PR MOST RECENT SYSTOLIC BLOOD PRESSURE >= 140 MM HG: ICD-10-PCS | Mod: CPTII,S$GLB,, | Performed by: INTERNAL MEDICINE

## 2022-08-04 PROCEDURE — 1126F PR PAIN SEVERITY QUANTIFIED, NO PAIN PRESENT: ICD-10-PCS | Mod: CPTII,S$GLB,, | Performed by: INTERNAL MEDICINE

## 2022-08-04 PROCEDURE — 3077F SYST BP >= 140 MM HG: CPT | Mod: CPTII,S$GLB,, | Performed by: INTERNAL MEDICINE

## 2022-08-04 PROCEDURE — 86300 IMMUNOASSAY TUMOR CA 15-3: CPT | Performed by: INTERNAL MEDICINE

## 2022-08-04 PROCEDURE — 1126F AMNT PAIN NOTED NONE PRSNT: CPT | Mod: CPTII,S$GLB,, | Performed by: INTERNAL MEDICINE

## 2022-08-04 PROCEDURE — 1101F PT FALLS ASSESS-DOCD LE1/YR: CPT | Mod: CPTII,S$GLB,, | Performed by: INTERNAL MEDICINE

## 2022-08-04 PROCEDURE — 3078F DIAST BP <80 MM HG: CPT | Mod: CPTII,S$GLB,, | Performed by: INTERNAL MEDICINE

## 2022-08-04 PROCEDURE — 85025 COMPLETE CBC W/AUTO DIFF WBC: CPT | Performed by: INTERNAL MEDICINE

## 2022-08-04 PROCEDURE — 99214 PR OFFICE/OUTPT VISIT, EST, LEVL IV, 30-39 MIN: ICD-10-PCS | Mod: S$GLB,,, | Performed by: INTERNAL MEDICINE

## 2022-08-04 PROCEDURE — 1159F MED LIST DOCD IN RCRD: CPT | Mod: CPTII,S$GLB,, | Performed by: INTERNAL MEDICINE

## 2022-08-04 PROCEDURE — 80053 COMPREHEN METABOLIC PANEL: CPT | Performed by: INTERNAL MEDICINE

## 2022-08-04 PROCEDURE — 3078F PR MOST RECENT DIASTOLIC BLOOD PRESSURE < 80 MM HG: ICD-10-PCS | Mod: CPTII,S$GLB,, | Performed by: INTERNAL MEDICINE

## 2022-08-04 PROCEDURE — 1101F PR PT FALLS ASSESS DOC 0-1 FALLS W/OUT INJ PAST YR: ICD-10-PCS | Mod: CPTII,S$GLB,, | Performed by: INTERNAL MEDICINE

## 2022-08-04 PROCEDURE — 36415 COLL VENOUS BLD VENIPUNCTURE: CPT | Performed by: INTERNAL MEDICINE

## 2022-08-04 PROCEDURE — 99214 OFFICE O/P EST MOD 30 MIN: CPT | Mod: S$GLB,,, | Performed by: INTERNAL MEDICINE

## 2022-08-05 LAB
CANCER AG15-3 SERPL-ACNC: 20.7 U/ML (ref 0–25)
CANCER AG27-29 SERPL-ACNC: 24.1 U/ML (ref 0–38.6)

## 2022-08-06 ENCOUNTER — TELEPHONE (OUTPATIENT)
Dept: HEMATOLOGY/ONCOLOGY | Facility: CLINIC | Age: 82
End: 2022-08-06

## 2022-08-06 RX ORDER — ONDANSETRON 2 MG/ML
16 INJECTION INTRAMUSCULAR; INTRAVENOUS ONCE
Status: CANCELLED
Start: 2022-08-16 | End: 2022-08-09

## 2022-08-07 NOTE — PROGRESS NOTES
[facility] In Office Hematology Oncology Subsequent Encounter Note    8/4/22      Subjective:      Patient ID:   Rosa Aceves  81 y.o. female  1940  [place]      Chief Complaint:   L breast cancer    HPI:  81 y.o. female had R breast cancer in 1996, treated with R MRM at [facility] in [place], [place].  She had adjuvant chemotherapy, no adjuvant Rad Rx.  Not clear, if she had adjuvant Tamoxifen.    Now with abnormal L mammogram.  1.5 cm mass 12 o'clock posteriorly at L breast.  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  Triple negative for ERP, PRP, H2N.    She had L mastectomy, Sentinal node bx 7 weeks ago.  LN negative.  0/1 positive.  She is triple negative.  Check PET.  7/18/22 ANDERSON    RR estimated 25- 30% at 4-5 years out with observation.  Adjuvant chemo with carboplatin, taxotere q 3 weeks x's 4-6 cycles,  udenyca support, gives RR 15% expected.  Can defer adriamycin administration.    Discussed treatment schema, potential benefit in reducing recurrence risk,  Potential side effects including hair loss, N/V/D, peripheral neuropathy, pancytopenia.  And other side effects.  Other regimens include adriamycin, but she had this 25 years ago and tolerated it with difficulty.    Peripheral veins poor, port placement per Dr. Renzo Bullard.    Tamoxifen, Arimidex not helpful here given negative ERP and PRP status.  Herceptin not helpful here given negative H@N status.  Triple negative dx.    Port placed 8/1/22, site tender, edematous.  Too fresh to access the port site.  Move D1C1 to 8/10/22.    Check BRCA 1 & 2 on her.  Triple negative, Bilateral breast cancer.    Hgb 12.7 to 10, creatinine 1.4.  GFR 43%.      ROS:   GEN: normal without any fever, night sweats or weight loss  HEENT: normal with no HA's, sore throat, stiff neck, changes in vision  CV: normal with no CP, SOB, PND, FIGUEREDO or orthopnea  PULM: normal with no SOB, cough, hemoptysis, sputum or pleuritic  pain  GI: normal with no abdominal pain, nausea, vomiting, constipation, diarrhea, melanotic stools, BRBPR, or hematemesis  : normal with no hematuria, dysuria  BREAST: See HPI  SKIN: normal with no rash, erythema, bruising, or swelling     Past Medical History:   Diagnosis Date    Aortic valve regurgitation     Benign paroxysmal vertigo, bilateral     Chronic kidney disease, unspecified     Coronary artery disease     Essential (primary) hypertension     Malignant neoplasm of right breast     Mitral valve regurgitation     Obesity, unspecified     Osteopenia     Positive colorectal cancer screening using Cologuard test     Rheumatoid arthritis, unspecified     Ruptured lumbar disc     Unspecified cataract      Past Surgical History:   Procedure Laterality Date    BREAST BIOPSY Left     BREAST SURGERY Left     CHOLECYSTECTOMY      EYE SURGERY      INJECTION FOR SENTINEL NODE IDENTIFICATION Left 5/26/2022    Procedure: INJECTION, FOR SENTINEL NODE IDENTIFICATION;  Surgeon: Renzo Jim MD;  Location: [facility] OR;  Service: General;  Laterality: Left;    INSERTION OF TUNNELED CENTRAL VENOUS CATHETER (CVC) WITH SUBCUTANEOUS PORT N/A 8/1/2022    Procedure: [id number]-[id number]-G-CATH;  Surgeon: Renzo Jim MD;  Location: [facility] OR;  Service: General;  Laterality: N/A;    MASTECTOMY Left 5/26/2022    Procedure: MASTECTOMY;  Surgeon: Renzo Jim MD;  Location: [facility] OR;  Service: General;  Laterality: Left;    MASTECTOMY, RADICAL Right     SENTINEL LYMPH NODE BIOPSY Left 5/26/2022    Procedure: BIOPSY, LYMPH NODE, SENTINEL;  Surgeon: Renzo Jim MD;  Location: [facility] OR;  Service: General;  Laterality: Left;    TUBAL LIGATION         Review of patient's allergies indicates:  No Known Allergies  Social History     Socioeconomic History    Marital status:     Number of children: 5   Tobacco Use    Smoking status: Never Smoker    Smokeless tobacco: Never Used   Substance  and Sexual Activity    Alcohol use: Never    Drug use: Never    Sexual activity: Not Currently     Partners: Male         Current Outpatient Medications:     dexAMETHasone (DECADRON) 4 MG Tab, Take 8mg the AM and PM before chemotherapy and the AM and PM the day after chemotherapy. Repeat every 21 days., Disp: 120 tablet, Rfl: 1    furosemide (LASIX) 40 MG tablet, Take 40 mg by mouth once daily., Disp: , Rfl:     HYDROcodone-acetaminophen (NORCO) 5-325 mg per tablet, Take 1 tablet by mouth every 6 (six) hours as needed for Pain., Disp: 10 tablet, Rfl: 0    HYDROcodone-acetaminophen  mg/15 mL(15 mL) Soln, 1 tablet EVERY 6 HOURS (route: oral), Disp: , Rfl:     LIDOcaine-prilocaine (EMLA) cream, Apply topically as needed (once before port a cath is accessed)., Disp: 5 g, Rfl: 2    olmesartan-amLODIPin-hcthiazid 40-5-25 mg Tab, Take 1 tablet by mouth Daily., Disp: , Rfl:     ondansetron (ZOFRAN) 4 MG tablet, Take 4 mg by mouth once., Disp: , Rfl:     ondansetron (ZOFRAN) 8 MG tablet, Take 1 tablet (8 mg total) by mouth every 8 (eight) hours as needed for Nausea., Disp: 30 tablet, Rfl: 2    traMADoL (ULTRAM) 50 mg tablet, Take 50 mg by mouth once daily., Disp: , Rfl:   No current facility-administered medications for this visit.    Facility-Administered Medications Ordered in Other Visits:     electrolyte-S (ISOLYTE), , Intravenous, Continuous, Irwin Russo MD    fentaNYL 50 mcg/mL injection 25 mcg, 25 mcg, Intravenous, Q5 Min PRN, Irwin Russo MD    HYDROmorphone (PF) injection 0.2 mg, 0.2 mg, Intravenous, Q5 Min PRN, Irwin Russo MD    lactated ringers infusion, , Intravenous, Continuous, Irwin Russo MD, Last Rate: 10 mL/hr at 08/01/22 1120, Restarted at 08/01/22 1234    LIDOcaine (PF) 10 mg/ml (1%) injection 10 mg, 1 mL, Intradermal, Once, Irwin Russo MD    oxyCODONE immediate release tablet 5 mg, 5 mg, Oral, Q3H PRN, Irwin Russo MD          Objective:   Vitals:  Blood  "pressure (!) 144/71, pulse 72, temperature 98.6 °F (37 °C), resp. rate 18, height 5' 3" (1.6 m), weight 80.7 kg (178 lb).    Physical Examination:   GEN: no apparent distress, comfortable  HEAD: atraumatic and normocephalic  EYES: no pallor, no icterus  ENT:  no pharyngeal erythema, external ears WNL; no nasal discharge  NECK: no masses, thyroid normal, trachea midline, no LAD/LN's, supple  CV: RRR with no murmur; normal pulse; normal S1 and S2; no pedal edema  CHEST: Normal respiratory effort; CTAB; normal breath sounds; no wheeze or crackles  ABDOM: nontender and nondistended; soft;  no rebound/guarding, L/S NP  MUSC/Skeletal: ROM normal; no crepitus; joints normal  EXTREM: no clubbing, cyanosis, inflammation or swelling  SKIN: no rashes, lesions, ulcers, petechiae   : no cvat  NEURO: grossly intact; motor/sensory WNL;  no tremors  PSYCH: normal mood, affect and behavior  LYMPH: normal cervical, supraclavicular, axillary and LN's  BREAST: L breast NT, no D/C and no palpable mass  R chest wall NT, postop changes, no palpable mass, incision healed.  She has decreased ROM at R shoulder and arm.    CBC, CMP, TSH unremarkable    Assessment:   (1) 81 y.o. female with diagnosis of invasive ductal Ca with lobular features at 12 o'clock at L breast.  1.5 cm, Triple negative for ERP, PRP, H2N.  S/P L mastectomy Primary 1.5 cm, LN negative.    (2)Hx of R breast cancer 25 years ago.  R MRM, adjuvant chemotherapy,adjuvant tamoxifen?    (3)Discussed neoadjuvant chemotherapy with Carbo, taxotere or CTX, taxotere, or AC q 3 weeks x's 4-6 cycles.  Followed by L mastectomy and SNBx and adjuvant Rad Rx.  Or L mastectomy, SNBx and no Rad Rx necessary.    (3)Because of age, above medical problems, frail appearance; I am hesitant to go with neoadjuvant chemo initially,  She may not have the stamina for later surgery.    (4)I would go with surgery initially,and then after she recovers, will reconsider her later for adjuvant " "Rx.?    (5)Check PET for staging, given triple negative dx, R shoulder sx.  PET ANDERSON.    (6)S/P L mastectomy, SNBx. 5/26/22    Port placement completed, site healing.    Adjuvant Carbo, Taxotere q 3 weeks x's 4-6 cycles.  Udenyca support.  D1C1 8/10/22.    5'6"   177#    See me 8/10/22.                      "

## 2022-08-08 DIAGNOSIS — Z17.1 MALIGNANT NEOPLASM OF CENTRAL PORTION OF LEFT BREAST IN FEMALE, ESTROGEN RECEPTOR NEGATIVE: Primary | ICD-10-CM

## 2022-08-08 DIAGNOSIS — C50.112 MALIGNANT NEOPLASM OF CENTRAL PORTION OF LEFT BREAST IN FEMALE, ESTROGEN RECEPTOR NEGATIVE: Primary | ICD-10-CM

## 2022-08-09 ENCOUNTER — TELEPHONE (OUTPATIENT)
Dept: HEMATOLOGY/ONCOLOGY | Facility: CLINIC | Age: 82
End: 2022-08-09

## 2022-08-09 NOTE — TELEPHONE ENCOUNTER
----- Message from Leana Negrete RN sent at 8/9/2022  9:12 AM CDT -----  Please call pt and schedule her a rtc on 8/29 or 8/30. Thank you!

## 2022-08-15 ENCOUNTER — SOCIAL WORK (OUTPATIENT)
Dept: HEMATOLOGY/ONCOLOGY | Facility: CLINIC | Age: 82
End: 2022-08-15

## 2022-08-15 ENCOUNTER — INFUSION (OUTPATIENT)
Dept: INFUSION THERAPY | Facility: HOSPITAL | Age: 82
End: 2022-08-15
Attending: INTERNAL MEDICINE
Payer: MEDICARE

## 2022-08-15 ENCOUNTER — DOCUMENTATION ONLY (OUTPATIENT)
Dept: HEMATOLOGY/ONCOLOGY | Facility: CLINIC | Age: 82
End: 2022-08-15

## 2022-08-15 VITALS
DIASTOLIC BLOOD PRESSURE: 71 MMHG | WEIGHT: 177.31 LBS | BODY MASS INDEX: 28.5 KG/M2 | TEMPERATURE: 97 F | RESPIRATION RATE: 18 BRPM | HEIGHT: 66 IN | HEART RATE: 79 BPM | OXYGEN SATURATION: 98 % | SYSTOLIC BLOOD PRESSURE: 141 MMHG

## 2022-08-15 DIAGNOSIS — C50.112 MALIGNANT NEOPLASM OF CENTRAL PORTION OF LEFT BREAST IN FEMALE, ESTROGEN RECEPTOR NEGATIVE: ICD-10-CM

## 2022-08-15 DIAGNOSIS — D70.1 CHEMOTHERAPY INDUCED NEUTROPENIA: Primary | ICD-10-CM

## 2022-08-15 DIAGNOSIS — Z17.1 MALIGNANT NEOPLASM OF CENTRAL PORTION OF LEFT BREAST IN FEMALE, ESTROGEN RECEPTOR NEGATIVE: ICD-10-CM

## 2022-08-15 DIAGNOSIS — T45.1X5A CHEMOTHERAPY INDUCED NEUTROPENIA: Primary | ICD-10-CM

## 2022-08-15 PROCEDURE — 96375 TX/PRO/DX INJ NEW DRUG ADDON: CPT

## 2022-08-15 PROCEDURE — 96367 TX/PROPH/DG ADDL SEQ IV INF: CPT

## 2022-08-15 PROCEDURE — 63600175 PHARM REV CODE 636 W HCPCS: Mod: JG | Performed by: INTERNAL MEDICINE

## 2022-08-15 PROCEDURE — 96413 CHEMO IV INFUSION 1 HR: CPT

## 2022-08-15 PROCEDURE — 96415 CHEMO IV INFUSION ADDL HR: CPT

## 2022-08-15 PROCEDURE — 25000003 PHARM REV CODE 250: Performed by: INTERNAL MEDICINE

## 2022-08-15 RX ORDER — FAMOTIDINE 10 MG/ML
20 INJECTION INTRAVENOUS ONCE
Status: COMPLETED | OUTPATIENT
Start: 2022-08-15 | End: 2022-08-15

## 2022-08-15 RX ORDER — EPINEPHRINE 0.3 MG/.3ML
0.3 INJECTION SUBCUTANEOUS ONCE AS NEEDED
Status: DISCONTINUED | OUTPATIENT
Start: 2022-08-15 | End: 2022-08-15 | Stop reason: HOSPADM

## 2022-08-15 RX ORDER — HEPARIN 100 UNIT/ML
500 SYRINGE INTRAVENOUS
Status: DISCONTINUED | OUTPATIENT
Start: 2022-08-15 | End: 2022-08-15 | Stop reason: HOSPADM

## 2022-08-15 RX ORDER — SODIUM CHLORIDE 0.9 % (FLUSH) 0.9 %
10 SYRINGE (ML) INJECTION
Status: DISCONTINUED | OUTPATIENT
Start: 2022-08-15 | End: 2022-08-15 | Stop reason: HOSPADM

## 2022-08-15 RX ADMIN — SODIUM CHLORIDE: 0.9 INJECTION, SOLUTION INTRAVENOUS at 08:08

## 2022-08-15 RX ADMIN — HEPARIN 500 UNITS: 100 SYRINGE at 01:08

## 2022-08-15 RX ADMIN — DOCETAXEL ANHYDROUS 140 MG: 10 INJECTION, SOLUTION INTRAVENOUS at 10:08

## 2022-08-15 RX ADMIN — FAMOTIDINE 20 MG: 10 INJECTION INTRAVENOUS at 08:08

## 2022-08-15 RX ADMIN — ONDANSETRON 16 MG: 2 INJECTION INTRAMUSCULAR; INTRAVENOUS at 09:08

## 2022-08-15 RX ADMIN — APREPITANT 130 MG: 130 INJECTION, EMULSION INTRAVENOUS at 10:08

## 2022-08-15 RX ADMIN — CARBOPLATIN 430 MG: 10 INJECTION, SOLUTION INTRAVENOUS at 11:08

## 2022-08-15 RX ADMIN — DIPHENHYDRAMINE HYDROCHLORIDE 25 MG: 50 INJECTION INTRAMUSCULAR; INTRAVENOUS at 08:08

## 2022-08-15 RX ADMIN — DEXAMETHASONE SODIUM PHOSPHATE 12 MG: 4 INJECTION, SOLUTION INTRA-ARTICULAR; INTRALESIONAL; INTRAMUSCULAR; INTRAVENOUS; SOFT TISSUE at 09:08

## 2022-08-15 NOTE — PROGRESS NOTES
CHEMO SCHOOL- NUTRITION    Rosa Aceves is a 81 y.o. female with triple negative breast cancer with personal hx of breast cancer and tx 25yrs ago. Pt will be receiving carboplatin and taxotere. I met with Ms. Aceves for chemo school today. Pt reports excellent appetite and intake. She reports recent 10# weight gain since her surgery. She reports having a good knowledge of nutrition related side effects of treatment from past treatment 25 years ago. She is not currently taking any herbal or antioxidant supplements. She currently eats 3 meals per day with frequent snacking in between meals. She reports struggling with hydration due to disliking water. She supplements her intake with Premier Protein shake daily. She denies N/V/D/C. BMs normal. Denies having any nutrition related questions or concerns at the current time.     CW: 177#.     Plan:   1. Reviewed chemo school packet & provided copy to pt.   2. Discussed importance of maintaining wt & staying hydrated.   3. Reviewed food safety guidelines recommended during treatment.   4. Discussed alternate sources of hydration  5. Provided RD contact info & encouraged pt to call with any questions/concerns.   6. Will f/u as needed.       Electronically signed by: Steffany Craig MBA, RDN, LDN

## 2022-08-15 NOTE — PLAN OF CARE
Problem: Fall Injury Risk  Goal: Absence of Fall and Fall-Related Injury  Outcome: Ongoing, Progressing  Intervention: Identify and Manage Contributors  Flowsheets (Taken 8/15/2022 9485)  Self-Care Promotion:   independence encouraged   safe use of adaptive equipment encouraged  Medication Review/Management: medications reviewed

## 2022-08-16 ENCOUNTER — INFUSION (OUTPATIENT)
Dept: INFUSION THERAPY | Facility: HOSPITAL | Age: 82
End: 2022-08-16
Attending: INTERNAL MEDICINE
Payer: MEDICARE

## 2022-08-16 ENCOUNTER — OFFICE VISIT (OUTPATIENT)
Dept: HEMATOLOGY/ONCOLOGY | Facility: CLINIC | Age: 82
End: 2022-08-16
Payer: MEDICARE

## 2022-08-16 VITALS
RESPIRATION RATE: 18 BRPM | BODY MASS INDEX: 28.45 KG/M2 | OXYGEN SATURATION: 97 % | WEIGHT: 177 LBS | DIASTOLIC BLOOD PRESSURE: 84 MMHG | HEIGHT: 66 IN | HEART RATE: 79 BPM | SYSTOLIC BLOOD PRESSURE: 142 MMHG | TEMPERATURE: 98 F

## 2022-08-16 VITALS
WEIGHT: 177 LBS | DIASTOLIC BLOOD PRESSURE: 77 MMHG | TEMPERATURE: 98 F | HEART RATE: 89 BPM | BODY MASS INDEX: 29.01 KG/M2 | SYSTOLIC BLOOD PRESSURE: 150 MMHG

## 2022-08-16 DIAGNOSIS — Z17.1 MALIGNANT NEOPLASM OF CENTRAL PORTION OF LEFT BREAST IN FEMALE, ESTROGEN RECEPTOR NEGATIVE: Primary | ICD-10-CM

## 2022-08-16 DIAGNOSIS — C50.112 MALIGNANT NEOPLASM OF CENTRAL PORTION OF LEFT BREAST IN FEMALE, ESTROGEN RECEPTOR NEGATIVE: ICD-10-CM

## 2022-08-16 DIAGNOSIS — C50.112 MALIGNANT NEOPLASM OF CENTRAL PORTION OF LEFT BREAST IN FEMALE, ESTROGEN RECEPTOR NEGATIVE: Primary | ICD-10-CM

## 2022-08-16 DIAGNOSIS — T45.1X5A CHEMOTHERAPY INDUCED NEUTROPENIA: Primary | ICD-10-CM

## 2022-08-16 DIAGNOSIS — C50.919 TRIPLE NEGATIVE MALIGNANT NEOPLASM OF BREAST: ICD-10-CM

## 2022-08-16 DIAGNOSIS — D70.1 CHEMOTHERAPY INDUCED NEUTROPENIA: Primary | ICD-10-CM

## 2022-08-16 DIAGNOSIS — Z17.1 MALIGNANT NEOPLASM OF CENTRAL PORTION OF LEFT BREAST IN FEMALE, ESTROGEN RECEPTOR NEGATIVE: ICD-10-CM

## 2022-08-16 PROBLEM — Z17.421 TRIPLE NEGATIVE MALIGNANT NEOPLASM OF BREAST: Status: ACTIVE | Noted: 2022-08-16

## 2022-08-16 PROCEDURE — 96372 THER/PROPH/DIAG INJ SC/IM: CPT | Mod: 59

## 2022-08-16 PROCEDURE — 3077F PR MOST RECENT SYSTOLIC BLOOD PRESSURE >= 140 MM HG: ICD-10-PCS | Mod: CPTII,S$GLB,, | Performed by: NURSE PRACTITIONER

## 2022-08-16 PROCEDURE — 3288F FALL RISK ASSESSMENT DOCD: CPT | Mod: CPTII,S$GLB,, | Performed by: NURSE PRACTITIONER

## 2022-08-16 PROCEDURE — 3078F PR MOST RECENT DIASTOLIC BLOOD PRESSURE < 80 MM HG: ICD-10-PCS | Mod: CPTII,S$GLB,, | Performed by: NURSE PRACTITIONER

## 2022-08-16 PROCEDURE — 25000003 PHARM REV CODE 250: Performed by: INTERNAL MEDICINE

## 2022-08-16 PROCEDURE — 1101F PR PT FALLS ASSESS DOC 0-1 FALLS W/OUT INJ PAST YR: ICD-10-PCS | Mod: CPTII,S$GLB,, | Performed by: NURSE PRACTITIONER

## 2022-08-16 PROCEDURE — 99214 PR OFFICE/OUTPT VISIT, EST, LEVL IV, 30-39 MIN: ICD-10-PCS | Mod: S$GLB,,, | Performed by: NURSE PRACTITIONER

## 2022-08-16 PROCEDURE — 63600175 PHARM REV CODE 636 W HCPCS: Performed by: INTERNAL MEDICINE

## 2022-08-16 PROCEDURE — 1126F AMNT PAIN NOTED NONE PRSNT: CPT | Mod: CPTII,S$GLB,, | Performed by: NURSE PRACTITIONER

## 2022-08-16 PROCEDURE — 3077F SYST BP >= 140 MM HG: CPT | Mod: CPTII,S$GLB,, | Performed by: NURSE PRACTITIONER

## 2022-08-16 PROCEDURE — 1159F PR MEDICATION LIST DOCUMENTED IN MEDICAL RECORD: ICD-10-PCS | Mod: CPTII,S$GLB,, | Performed by: NURSE PRACTITIONER

## 2022-08-16 PROCEDURE — 1101F PT FALLS ASSESS-DOCD LE1/YR: CPT | Mod: CPTII,S$GLB,, | Performed by: NURSE PRACTITIONER

## 2022-08-16 PROCEDURE — 99214 OFFICE O/P EST MOD 30 MIN: CPT | Mod: S$GLB,,, | Performed by: NURSE PRACTITIONER

## 2022-08-16 PROCEDURE — 3288F PR FALLS RISK ASSESSMENT DOCUMENTED: ICD-10-PCS | Mod: CPTII,S$GLB,, | Performed by: NURSE PRACTITIONER

## 2022-08-16 PROCEDURE — 3078F DIAST BP <80 MM HG: CPT | Mod: CPTII,S$GLB,, | Performed by: NURSE PRACTITIONER

## 2022-08-16 PROCEDURE — 1126F PR PAIN SEVERITY QUANTIFIED, NO PAIN PRESENT: ICD-10-PCS | Mod: CPTII,S$GLB,, | Performed by: NURSE PRACTITIONER

## 2022-08-16 PROCEDURE — 96365 THER/PROPH/DIAG IV INF INIT: CPT

## 2022-08-16 PROCEDURE — 1159F MED LIST DOCD IN RCRD: CPT | Mod: CPTII,S$GLB,, | Performed by: NURSE PRACTITIONER

## 2022-08-16 RX ORDER — HEPARIN 100 UNIT/ML
500 SYRINGE INTRAVENOUS
Status: COMPLETED | OUTPATIENT
Start: 2022-08-16 | End: 2022-08-16

## 2022-08-16 RX ADMIN — HEPARIN 500 UNITS: 100 SYRINGE at 11:08

## 2022-08-16 RX ADMIN — ONDANSETRON 16 MG: 2 INJECTION INTRAMUSCULAR; INTRAVENOUS at 10:08

## 2022-08-16 RX ADMIN — PEGFILGRASTIM-CBQV 6 MG: 6 INJECTION, SOLUTION SUBCUTANEOUS at 10:08

## 2022-08-16 NOTE — ASSESSMENT & PLAN NOTE
Post Day 1 of Taxotere and Carboplatin   Due for neulasta today   Doing well   Staying up to date on nausea medications   No diarrhea or constipation

## 2022-08-16 NOTE — PROGRESS NOTES
[facility] HEMATOLGY ONCOLOGY CONSULTATION    Subjective:       Patient ID: Rosa Aceves is a 81 y.o. female returning today for a regularly scheduled follow-up visit.    Chief Complaint: Breast Cancer - Follow up     HPI  Patient presents today with her daughter.   She received her first cycle of Taxotere and Carboplatin yesterday.   She has no complaints today.     She is due to receive zofran and udenyca today.       Past Medical History:   Diagnosis Date    Aortic valve regurgitation     Benign paroxysmal vertigo, bilateral     Chronic kidney disease, unspecified     Coronary artery disease     Essential (primary) hypertension     Malignant neoplasm of right breast     Mitral valve regurgitation     Obesity, unspecified     Osteopenia     Positive colorectal cancer screening using Cologuard test     Rheumatoid arthritis, unspecified     Ruptured lumbar disc     Unspecified cataract        Past Surgical History:   Procedure Laterality Date    BREAST BIOPSY Left     BREAST SURGERY Left     CHOLECYSTECTOMY      EYE SURGERY      INJECTION FOR SENTINEL NODE IDENTIFICATION Left 5/26/2022    Procedure: INJECTION, FOR SENTINEL NODE IDENTIFICATION;  Surgeon: Renzo Jim MD;  Location: [facility] OR;  Service: General;  Laterality: Left;    INSERTION OF TUNNELED CENTRAL VENOUS CATHETER (CVC) WITH SUBCUTANEOUS PORT N/A 8/1/2022    Procedure: [id number]-Q-CATH;  Surgeon: Renzo Jim MD;  Location: [facility] OR;  Service: General;  Laterality: N/A;    MASTECTOMY Left 5/26/2022    Procedure: MASTECTOMY;  Surgeon: Renzo Jim MD;  Location: [facility] OR;  Service: General;  Laterality: Left;    MASTECTOMY, RADICAL Right     SENTINEL LYMPH NODE BIOPSY Left 5/26/2022    Procedure: BIOPSY, LYMPH NODE, SENTINEL;  Surgeon: Renzo Jim MD;  Location: [facility] OR;  Service: General;  Laterality: Left;    TUBAL LIGATION         Social History     Socioeconomic History    Marital status:      Number of children: 5   Tobacco Use    Smoking status: Never Smoker    Smokeless tobacco: Never Used   Substance and Sexual Activity    Alcohol use: Never    Drug use: Never    Sexual activity: Not Currently     Partners: Male       Family History   Problem Relation Age of Onset    Cancer Mother     Heart failure Father     Cancer Sister     Cancer Grandchild        Review of patient's allergies indicates:  No Known Allergies    Current Outpatient Medications:     dexAMETHasone (DECADRON) 4 MG Tab, Take 8mg the AM and PM before chemotherapy and the AM and PM the day after chemotherapy. Repeat every 21 days., Disp: 120 tablet, Rfl: 1    furosemide (LASIX) 40 MG tablet, Take 40 mg by mouth once daily., Disp: , Rfl:     HYDROcodone-acetaminophen (NORCO) 5-325 mg per tablet, Take 1 tablet by mouth every 6 (six) hours as needed for Pain., Disp: 10 tablet, Rfl: 0    HYDROcodone-acetaminophen  mg/15 mL(15 mL) Soln, 1 tablet EVERY 6 HOURS (route: oral), Disp: , Rfl:     LIDOcaine-prilocaine (EMLA) cream, Apply topically as needed (once before port a cath is accessed)., Disp: 5 g, Rfl: 2    olmesartan-amLODIPin-hcthiazid 40-5-25 mg Tab, Take 1 tablet by mouth Daily., Disp: , Rfl:     ondansetron (ZOFRAN) 4 MG tablet, Take 4 mg by mouth once., Disp: , Rfl:     ondansetron (ZOFRAN) 8 MG tablet, Take 1 tablet (8 mg total) by mouth every 8 (eight) hours as needed for Nausea., Disp: 30 tablet, Rfl: 2    traMADoL (ULTRAM) 50 mg tablet, Take 50 mg by mouth once daily., Disp: , Rfl:   No current facility-administered medications for this visit.    Facility-Administered Medications Ordered in Other Visits:     electrolyte-S (ISOLYTE), , Intravenous, Continuous, Irwin Russo MD    fentaNYL 50 mcg/mL injection 25 mcg, 25 mcg, Intravenous, Q5 Min PRN, Irwin Russo MD    HYDROmorphone (PF) injection 0.2 mg, 0.2 mg, Intravenous, Q5 Min PRN, Irwin Russo MD    lactated ringers infusion, ,  Intravenous, Continuous, Irwin Russo MD, Last Rate: 10 mL/hr at 08/01/22 1120, Restarted at 08/01/22 1234    LIDOcaine (PF) 10 mg/ml (1%) injection 10 mg, 1 mL, Intradermal, Once, Irwin Russo MD    oxyCODONE immediate release tablet 5 mg, 5 mg, Oral, Q3H PRN, Irwin Russo MD    All medications and past history have been reviewed.    Review of Systems   Constitutional: Positive for fatigue. Negative for activity change, appetite change and fever.   HENT: Negative for congestion, mouth sores, postnasal drip, rhinorrhea, sinus pressure, sore throat and trouble swallowing.    Eyes: Negative for photophobia and visual disturbance.   Respiratory: Negative for cough, chest tightness, shortness of breath and wheezing.    Cardiovascular: Negative for chest pain and leg swelling.   Gastrointestinal: Negative for abdominal distention, abdominal pain, constipation, diarrhea, nausea and vomiting.   Endocrine: Negative for cold intolerance.   Genitourinary: Negative for decreased urine volume, dysuria and frequency.   Musculoskeletal: Positive for arthralgias. Negative for myalgias.        Uses walker to ambulate     Skin: Negative for pallor and rash.   Allergic/Immunologic: Negative for immunocompromised state.   Neurological: Negative for dizziness, syncope, weakness, light-headedness, numbness and headaches.   Hematological: Negative for adenopathy. Does not bruise/bleed easily.   Psychiatric/Behavioral: Negative for sleep disturbance. The patient is not nervous/anxious.        Objective:        BP (!) 150/77   Pulse 89   Temp 97.8 °F (36.6 °C)   Wt 80.3 kg (177 lb)   BMI 29.01 kg/m²     Physical Exam  Constitutional:       Appearance: Normal appearance.      Comments: Elderly appearing      HENT:      Head: Normocephalic and atraumatic.      Mouth/Throat:      Mouth: Mucous membranes are moist.   Cardiovascular:      Rate and Rhythm: Normal rate and regular rhythm.      Pulses: Normal pulses.      Heart  sounds: Normal heart sounds.   Pulmonary:      Effort: Pulmonary effort is normal. No respiratory distress.      Breath sounds: Normal breath sounds. No wheezing.   Chest:   Breasts:      Right: Absent.      Left: Absent.         Abdominal:      General: There is no distension.      Palpations: Abdomen is soft. There is no mass.      Tenderness: There is no abdominal tenderness.   Musculoskeletal:         General: No swelling.      Right lower leg: No edema.      Left lower leg: No edema.      Comments: Uses walker to ambulate     Skin:     General: Skin is warm and dry.      Capillary Refill: Capillary refill takes 2 to 3 seconds.      Findings: No bruising or rash.   Neurological:      Mental Status: She is alert and oriented to person, place, and time. Mental status is at baseline.      Motor: No weakness.   Psychiatric:         Mood and Affect: Mood normal.         Behavior: Behavior normal.           Lab:   Ref Range & Units 3 d ago   WBC 4.8 - 10.8 luly/L 7.3    RBC 4.50 - 5.50 tril/L 4.07 Low     Hemoglobin 12.0 - 15.0 g/dL 12.9    Hematocrit 37.0 - 47.0 % 38.3    MCV 81 - 97 fL 94    MCH 27 - 32 pg 32    MCHC 32 - 36 g/dL 34    RDW 11.5 - 14.5 % 13.7    Platelet Count- Automated 150 - 400 luly/L 251    MPV 7.4 - 10.4 fL 8.7    Granulocyte Relative 42.2 - 75.2 % 65.4    Lymphocytes Relative 20.5 - 51.1 % 23.3    Monocytes Relative 1.7 - 9.3 % 7.7    Eosinophils Relative 0.0 - <5.0 % 2.5    Basophils Relative 0.0 - <5.0 % 1.1    Granulocytes Absolute 1.4 - 6.5 K/UL 4.8    Lymphocyte Absolute 1.2 - 3.4 K/uL 1.7    Monocyte Absolute 0.11 - 0.59 K/uL 0.60 High     Eosinophils Absolute 0.00 - 0.70 K/UL 0.20    Basophils Absolute 0.00 - 0.20 K/UL 0.10    ANC 1400 - 6500 /uL 4800    Nucleated RBCS 0 - 0 /100 0    Resulting Agency  [facility]     Contains abnormal data Comprehensive metabolic panel  Order: [id number]   Ref Range & Units 3 d ago   Sodium 137 - 145 mmol/L 140    Potassium 3.5 - 5.1 mmol/L 4.6    Chloride  98 - 107 mmol/L 104    CO2 22 - 30 mmol/L 27    BUN 9 - 20 mg/dL 31 High     Creatinine 0.80 - 1.50 mg/dL 1.37    Glucose 74 - 106 mg/dL 130 High     Calcium 8.2 - 10.0 mg/dL 9.6    Albumin 3.5 - 5.0 g/dL 4.2    AST <=32 U/L 15    ALT <=33 U/L 10    Alkaline Phosphatase 38 - 126 U/L 102    Bilirubin, Total 0.2 - 1.3 mg/dL 0.2    Total Protein 6.3 - 8.2 g/dL 7.5    Anion Gap 3 - 15 mmol/L 9    A/G ratio 1.0 - 2.0 1.3    Globulin 2.0 - 4.0 g/dL 3.3    Osmolality Calc 275 - 295 mOsmol/kg 288    Non-AF American GFR >=60 mL/min 37 Low     AF American GFR >=60 mL/min 45 Low     Resulting Agency  [facility]         Radiology/Diagnostic Studies:  Final Pathologic Diagnosis 1. Left axillary sentinel lymph node, excision:   1 lymph node with no evidence of metastatic carcinoma (0/1)   Multiple H&E levels were examined.  Immunohistochemical stains for AE1/AE3,   WSK, and Cam 5.2 were performed with adequate controls and are negative   2. Left breast, mastectomy:   Invasive ductal carcinoma, Grade 3, measuring 15 mm (1.5 cm) in greatest   linear dimension   Ductal carcinoma in situ (DCIS), Grade II, solid type   All margins are negative for both invasive carcinoma and DCIS   Biopsy site with clip identified   Microcalcifications associated with benign breast   Skin with benign seborrheic keratosis   See surgical pathology cancer case summary below   Tumor block for future studies:  2G   Breast molecular markers:   ER:  Negative.  Internal control cells present and stain as expected.   NE:  Negative.  Internal control cells present and stain as expected.   HER2:  Negative (score 1+)   Ki-67:  85%   Surgical pathology cancer case summary:   Procedure:  Total mastectomy   Specimen laterality:  Left   Tumor site:  Not specified   Histologic type:  Invasive ductal carcinoma   Histologic grade:  Glandular differentiation:  Score 3   Nuclear pleomorphism:  Score 3   Mitotic rate:  Score 3   Overall grade:  Grade 3 (Score 9)   Tumor  size:  Greatest dimension of largest invasive focus greater than 1 mm:   15 mm   Tumor focality:  Single focus of invasive carcinoma   Ductal carcinoma in Situ (DCIS):  Present   Estimated size of DCIS is at least in mm:  3 mm   Nuclear grade:  Grade II (intermediate)   Necrosis:  Not identified   Lobular carcinoma in Situ (LCIS):  Not identified   Lymphovascular invasion:  Not identified   Dermal lymphovascular invasion:  Not identified   Microcalcifications:  Present in nonneoplastic tissue   Treatment effect in the breast:  No known presurgical therapy   Treatment effect in the lymph nodes:  Not applicable   Margins status for invasive carcinoma:  All margins negative for invasive   carcinoma   Distance from invasive carcinoma to closest margin:  Exact distance:  10 mm   Closest margin to invasive carcinoma:  Posterior   All other margins are located greater than 10 mm from invasive carcinoma   Margins status for DCIS:  All margins negative for DCIS   All margins located greater than 10 mm from DCIS   Regional lymph node status:  Regional lymph nodes present   All regional lymph nodes negative for tumor   Total number of lymph nodes examined (sentinel and nonsentinel):  Exact   number:  1   Number of sentinel nodes examined:  Exact number:  1   Distant metastases:  Not applicable   Pathologic stage classification (pTNM):   pT: pT1c:  Tumor greater than 10 mm but less than or equal to 20 mm in   greatest dimension   Regional lymph nodes modifier:  (sn):  [place] node evaluated   pN: (sn)pN0   pM:  Not applicable          All lab results and imaging results have been reviewed and discussed with the patient.   Assessment/Plan:       1. Malignant neoplasm of central portion of left breast in female, estrogen receptor negative    2. Triple negative malignant neoplasm of breast      Malignant neoplasm of central portion of left breast in female, estrogen receptor negative    Triple negative malignant neoplasm of  breast    Post Day 1 of Taxotere and Carboplatin   Due for neulasta today   Doing well   Staying up to date on nausea medications   No diarrhea or constipation           Follow up in about 2 weeks (around 8/30/2022) for with Dr. Bullard.     I have explained and the patient understands all of  the current recommendation(s). I have answered all of their questions to the best of my ability and to their complete satisfaction.   The patient is to continue with the current management plan.            Electronically signed by: Rachel Dobson, MSN, APRN, AGNP-C

## 2022-08-16 NOTE — PLAN OF CARE
Problem: Fatigue  Goal: Improved Activity Tolerance  Outcome: Ongoing, Progressing  Intervention: Promote Improved Energy  Flowsheets (Taken 8/16/2022 1035)  Fatigue Management:   frequent rest breaks encouraged   fatigue-related activity identified   paced activity encouraged  Sleep/Rest Enhancement:   regular sleep/rest pattern promoted   relaxation techniques promoted

## 2022-08-16 NOTE — PROGRESS NOTES
Rosa Aceves is a 81 year old diagnosed with breast cancer.  She is here today for their chemo infusion.  I met with patient to complete new patient orientation and to complete the NCCN Distress Screening; patient indicated a rating of 0.  Patient denied needing psychosocial support at this time.  I provided patient with the [facility] community events flyer and my contact information in the event supportive services arise in the future.

## 2022-08-19 ENCOUNTER — TELEPHONE (OUTPATIENT)
Dept: HEMATOLOGY/ONCOLOGY | Facility: CLINIC | Age: 82
End: 2022-08-19

## 2022-08-19 ENCOUNTER — PATIENT MESSAGE (OUTPATIENT)
Dept: HEMATOLOGY/ONCOLOGY | Facility: CLINIC | Age: 82
End: 2022-08-19

## 2022-08-19 NOTE — TELEPHONE ENCOUNTER
Spoke to pt's caregiver. Advised to get labs done today due to weakness. Verbalized understanding.

## 2022-08-21 ENCOUNTER — PATIENT MESSAGE (OUTPATIENT)
Dept: HEMATOLOGY/ONCOLOGY | Facility: CLINIC | Age: 82
End: 2022-08-21

## 2022-08-23 ENCOUNTER — TELEPHONE (OUTPATIENT)
Dept: HEMATOLOGY/ONCOLOGY | Facility: CLINIC | Age: 82
End: 2022-08-23

## 2022-08-24 ENCOUNTER — EXTERNAL HOME HEALTH (OUTPATIENT)
Dept: HOME HEALTH SERVICES | Facility: HOSPITAL | Age: 82
End: 2022-08-24
Payer: MEDICARE

## 2022-08-25 ENCOUNTER — OFFICE VISIT (OUTPATIENT)
Dept: HEMATOLOGY/ONCOLOGY | Facility: CLINIC | Age: 82
End: 2022-08-25
Payer: MEDICARE

## 2022-08-25 VITALS
DIASTOLIC BLOOD PRESSURE: 75 MMHG | TEMPERATURE: 98 F | HEART RATE: 102 BPM | BODY MASS INDEX: 27.8 KG/M2 | RESPIRATION RATE: 18 BRPM | WEIGHT: 173 LBS | HEIGHT: 66 IN | SYSTOLIC BLOOD PRESSURE: 138 MMHG

## 2022-08-25 DIAGNOSIS — K52.1 DIARRHEA DUE TO DRUG: Primary | ICD-10-CM

## 2022-08-25 PROCEDURE — 3075F SYST BP GE 130 - 139MM HG: CPT | Mod: CPTII,S$GLB,, | Performed by: INTERNAL MEDICINE

## 2022-08-25 PROCEDURE — 1159F MED LIST DOCD IN RCRD: CPT | Mod: CPTII,S$GLB,, | Performed by: INTERNAL MEDICINE

## 2022-08-25 PROCEDURE — 3288F FALL RISK ASSESSMENT DOCD: CPT | Mod: CPTII,S$GLB,, | Performed by: INTERNAL MEDICINE

## 2022-08-25 PROCEDURE — 3288F PR FALLS RISK ASSESSMENT DOCUMENTED: ICD-10-PCS | Mod: CPTII,S$GLB,, | Performed by: INTERNAL MEDICINE

## 2022-08-25 PROCEDURE — 3075F PR MOST RECENT SYSTOLIC BLOOD PRESS GE 130-139MM HG: ICD-10-PCS | Mod: CPTII,S$GLB,, | Performed by: INTERNAL MEDICINE

## 2022-08-25 PROCEDURE — 3078F DIAST BP <80 MM HG: CPT | Mod: CPTII,S$GLB,, | Performed by: INTERNAL MEDICINE

## 2022-08-25 PROCEDURE — 1126F AMNT PAIN NOTED NONE PRSNT: CPT | Mod: CPTII,S$GLB,, | Performed by: INTERNAL MEDICINE

## 2022-08-25 PROCEDURE — 99213 OFFICE O/P EST LOW 20 MIN: CPT | Mod: S$GLB,,, | Performed by: INTERNAL MEDICINE

## 2022-08-25 PROCEDURE — 1101F PR PT FALLS ASSESS DOC 0-1 FALLS W/OUT INJ PAST YR: ICD-10-PCS | Mod: CPTII,S$GLB,, | Performed by: INTERNAL MEDICINE

## 2022-08-25 PROCEDURE — 1101F PT FALLS ASSESS-DOCD LE1/YR: CPT | Mod: CPTII,S$GLB,, | Performed by: INTERNAL MEDICINE

## 2022-08-25 PROCEDURE — 99213 PR OFFICE/OUTPT VISIT, EST, LEVL III, 20-29 MIN: ICD-10-PCS | Mod: S$GLB,,, | Performed by: INTERNAL MEDICINE

## 2022-08-25 PROCEDURE — 1126F PR PAIN SEVERITY QUANTIFIED, NO PAIN PRESENT: ICD-10-PCS | Mod: CPTII,S$GLB,, | Performed by: INTERNAL MEDICINE

## 2022-08-25 PROCEDURE — 1159F PR MEDICATION LIST DOCUMENTED IN MEDICAL RECORD: ICD-10-PCS | Mod: CPTII,S$GLB,, | Performed by: INTERNAL MEDICINE

## 2022-08-25 PROCEDURE — 3078F PR MOST RECENT DIASTOLIC BLOOD PRESSURE < 80 MM HG: ICD-10-PCS | Mod: CPTII,S$GLB,, | Performed by: INTERNAL MEDICINE

## 2022-08-25 RX ORDER — CHOLESTYRAMINE 4 G/9G
1 POWDER, FOR SUSPENSION ORAL 2 TIMES DAILY
Qty: 20 PACKET | Refills: 1 | Status: SHIPPED | OUTPATIENT
Start: 2022-08-25 | End: 2024-02-01

## 2022-08-25 RX ORDER — DIPHENOXYLATE HYDROCHLORIDE AND ATROPINE SULFATE 2.5; .025 MG/1; MG/1
1 TABLET ORAL 4 TIMES DAILY PRN
Qty: 40 TABLET | Refills: 1 | Status: SHIPPED | OUTPATIENT
Start: 2022-08-25 | End: 2024-02-01

## 2022-08-26 ENCOUNTER — LAB VISIT (OUTPATIENT)
Dept: LAB | Facility: CLINIC | Age: 82
End: 2022-08-26
Payer: MEDICARE

## 2022-08-26 DIAGNOSIS — T45.1X5A CHEMOTHERAPY INDUCED NEUTROPENIA: ICD-10-CM

## 2022-08-26 DIAGNOSIS — C50.919 TRIPLE NEGATIVE MALIGNANT NEOPLASM OF BREAST: ICD-10-CM

## 2022-08-26 DIAGNOSIS — C50.112 MALIGNANT NEOPLASM OF CENTRAL PORTION OF LEFT BREAST IN FEMALE, ESTROGEN RECEPTOR NEGATIVE: ICD-10-CM

## 2022-08-26 DIAGNOSIS — Z17.1 MALIGNANT NEOPLASM OF CENTRAL PORTION OF LEFT BREAST IN FEMALE, ESTROGEN RECEPTOR NEGATIVE: ICD-10-CM

## 2022-08-26 DIAGNOSIS — D70.1 CHEMOTHERAPY INDUCED NEUTROPENIA: ICD-10-CM

## 2022-08-26 LAB
ALBUMIN SERPL BCP-MCNC: 3 G/DL (ref 3.5–5.2)
ALP SERPL-CCNC: 103 U/L (ref 55–135)
ALT SERPL W/O P-5'-P-CCNC: 23 U/L (ref 10–44)
ANION GAP SERPL CALC-SCNC: 10 MMOL/L (ref 8–16)
AST SERPL-CCNC: 17 U/L (ref 10–40)
BASOPHILS # BLD AUTO: 0.12 K/UL (ref 0–0.2)
BASOPHILS NFR BLD: 0.5 % (ref 0–1.9)
BILIRUB SERPL-MCNC: 0.2 MG/DL (ref 0.1–1)
BUN SERPL-MCNC: 22 MG/DL (ref 8–23)
CALCIUM SERPL-MCNC: 9.2 MG/DL (ref 8.7–10.5)
CHLORIDE SERPL-SCNC: 105 MMOL/L (ref 95–110)
CO2 SERPL-SCNC: 24 MMOL/L (ref 23–29)
CREAT SERPL-MCNC: 1.2 MG/DL (ref 0.5–1.4)
DIFFERENTIAL METHOD: ABNORMAL
EOSINOPHIL # BLD AUTO: 0 K/UL (ref 0–0.5)
EOSINOPHIL NFR BLD: 0.1 % (ref 0–8)
ERYTHROCYTE [DISTWIDTH] IN BLOOD BY AUTOMATED COUNT: 13.3 % (ref 11.5–14.5)
EST. GFR  (NO RACE VARIABLE): 46 ML/MIN/1.73 M^2
GLUCOSE SERPL-MCNC: 146 MG/DL (ref 70–110)
HCT VFR BLD AUTO: 34.5 % (ref 37–48.5)
HGB BLD-MCNC: 11.1 G/DL (ref 12–16)
IMM GRANULOCYTES # BLD AUTO: 0.74 K/UL (ref 0–0.04)
IMM GRANULOCYTES NFR BLD AUTO: 3.2 % (ref 0–0.5)
LYMPHOCYTES # BLD AUTO: 2.2 K/UL (ref 1–4.8)
LYMPHOCYTES NFR BLD: 9.5 % (ref 18–48)
MCH RBC QN AUTO: 31.7 PG (ref 27–31)
MCHC RBC AUTO-ENTMCNC: 32.2 G/DL (ref 32–36)
MCV RBC AUTO: 99 FL (ref 82–98)
MONOCYTES # BLD AUTO: 1.2 K/UL (ref 0.3–1)
MONOCYTES NFR BLD: 5.1 % (ref 4–15)
NEUTROPHILS # BLD AUTO: 19 K/UL (ref 1.8–7.7)
NEUTROPHILS NFR BLD: 81.6 % (ref 38–73)
NRBC BLD-RTO: 0 /100 WBC
PLATELET # BLD AUTO: 134 K/UL (ref 150–450)
PMV BLD AUTO: 12.4 FL (ref 9.2–12.9)
POTASSIUM SERPL-SCNC: 4.8 MMOL/L (ref 3.5–5.1)
PROT SERPL-MCNC: 6.1 G/DL (ref 6–8.4)
RBC # BLD AUTO: 3.5 M/UL (ref 4–5.4)
SODIUM SERPL-SCNC: 139 MMOL/L (ref 136–145)
WBC # BLD AUTO: 23.33 K/UL (ref 3.9–12.7)

## 2022-08-26 PROCEDURE — 36415 COLL VENOUS BLD VENIPUNCTURE: CPT | Mod: PN,,, | Performed by: STUDENT IN AN ORGANIZED HEALTH CARE EDUCATION/TRAINING PROGRAM

## 2022-08-26 PROCEDURE — 36415 PR COLLECTION VENOUS BLOOD,VENIPUNCTURE: ICD-10-PCS | Mod: PN,,, | Performed by: STUDENT IN AN ORGANIZED HEALTH CARE EDUCATION/TRAINING PROGRAM

## 2022-08-26 PROCEDURE — 80053 COMPREHEN METABOLIC PANEL: CPT | Performed by: INTERNAL MEDICINE

## 2022-08-26 PROCEDURE — 85025 COMPLETE CBC W/AUTO DIFF WBC: CPT | Performed by: INTERNAL MEDICINE

## 2022-08-27 NOTE — PROGRESS NOTES
[facility] In Office Hematology Oncology Subsequent Encounter Note    8/25/22      Subjective:      Patient ID:   Rosa Aceves  81 y.o. female  1940  Elliot Forrest LeBlanc      Chief Complaint:   L breast cancer    HPI:  81 y.o. female had R breast cancer in 1996, treated with R MRM at [facility] in [place], [place].  She had adjuvant chemotherapy, no adjuvant Rad Rx.  Not clear, if she had adjuvant Tamoxifen.    Now with abnormal L mammogram.  1.5 cm mass 12 o'clock posteriorly at L breast.  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  Triple negative for ERP, PRP, H2N.    She had L mastectomy, Sentinal node bx 7 weeks ago.  LN negative.  0/1 positive.  She is triple negative.  Check PET.  7/18/22 ANDERSON    RR estimated 25- 30% at 4-5 years out with observation.  Adjuvant chemo with carboplatin, taxotere q 3 weeks x's 4-6 cycles,  udenyca support, gives RR 15% expected.  Can defer adriamycin administration.    Discussed treatment schema, potential benefit in reducing recurrence risk,  Potential side effects including hair loss, N/V/D, peripheral neuropathy, pancytopenia.  And other side effects.  Other regimens include adriamycin, but she had this 25 years ago and tolerated it with difficulty.    Peripheral veins poor, port placement per Dr. Renzo Bullard.    Tamoxifen, Arimidex not helpful here given negative ERP and PRP status.  Herceptin not helpful here given negative H@N status.  Triple negative dx.    Port placed 8/1/22, site tender, edematous.  Too fresh to access the port site.  Move D1C1 to 8/10/22.    Check BRCA 1 & 2 on her.  Triple negative, Bilateral breast cancer.    Hgb 12.7 to 10, creatinine 1.4.  GFR 43%.  S/P first cycle of chemo.  She had much toxicity.  She does not agree to go forward with #2.    Abnormal cologuard, she need colonoscopy, when she is stronger.  She has Amydesis HHA.  Energy 3/10  RTC 2 weeks.        ROS:   GEN: normal without any fever, night  sweats or weight loss  HEENT: normal with no HA's, sore throat, stiff neck, changes in vision  CV: normal with no CP, SOB, PND, FIGUEREDO or orthopnea  PULM: normal with no SOB, cough, hemoptysis, sputum or pleuritic pain  GI: normal with no abdominal pain, nausea, vomiting, constipation, diarrhea, melanotic stools, BRBPR, or hematemesis  : normal with no hematuria, dysuria  BREAST: See HPI  SKIN: normal with no rash, erythema, bruising, or swelling     Past Medical History:   Diagnosis Date    Aortic valve regurgitation     Benign paroxysmal vertigo, bilateral     Chronic kidney disease, unspecified     Coronary artery disease     Essential (primary) hypertension     Malignant neoplasm of right breast     Mitral valve regurgitation     Obesity, unspecified     Osteopenia     Positive colorectal cancer screening using Cologuard test     Rheumatoid arthritis, unspecified     Ruptured lumbar disc     Unspecified cataract      Past Surgical History:   Procedure Laterality Date    BREAST BIOPSY Left     BREAST SURGERY Left     CHOLECYSTECTOMY      EYE SURGERY      INJECTION FOR SENTINEL NODE IDENTIFICATION Left 5/26/2022    Procedure: INJECTION, FOR SENTINEL NODE IDENTIFICATION;  Surgeon: Renzo Jim MD;  Location: [facility] OR;  Service: General;  Laterality: Left;    INSERTION OF TUNNELED CENTRAL VENOUS CATHETER (CVC) WITH SUBCUTANEOUS PORT N/A 8/1/2022    Procedure: [id number];  Surgeon: Renzo Jim MD;  Location: [facility] OR;  Service: General;  Laterality: N/A;    MASTECTOMY Left 5/26/2022    Procedure: MASTECTOMY;  Surgeon: Renzo Jim MD;  Location: [facility] OR;  Service: General;  Laterality: Left;    MASTECTOMY, RADICAL Right     SENTINEL LYMPH NODE BIOPSY Left 5/26/2022    Procedure: BIOPSY, LYMPH NODE, SENTINEL;  Surgeon: Renzo Jim MD;  Location: [facility] OR;  Service: General;  Laterality: Left;    TUBAL LIGATION         Review of patient's allergies  indicates:  No Known Allergies  Social History     Socioeconomic History    Marital status:     Number of children: 5   Tobacco Use    Smoking status: Never Smoker    Smokeless tobacco: Never Used   Substance and Sexual Activity    Alcohol use: Never    Drug use: Never    Sexual activity: Not Currently     Partners: Male         Current Outpatient Medications:     dexAMETHasone (DECADRON) 4 MG Tab, Take 8mg the AM and PM before chemotherapy and the AM and PM the day after chemotherapy. Repeat every 21 days., Disp: 120 tablet, Rfl: 1    furosemide (LASIX) 40 MG tablet, Take 40 mg by mouth once daily., Disp: , Rfl:     HYDROcodone-acetaminophen (NORCO) 5-325 mg per tablet, Take 1 tablet by mouth every 6 (six) hours as needed for Pain., Disp: 10 tablet, Rfl: 0    HYDROcodone-acetaminophen  mg/15 mL(15 mL) Soln, 1 tablet EVERY 6 HOURS (route: oral), Disp: , Rfl:     LIDOcaine-prilocaine (EMLA) cream, Apply topically as needed (once before port a cath is accessed)., Disp: 5 g, Rfl: 2    olmesartan-amLODIPin-hcthiazid 40-5-25 mg Tab, Take 1 tablet by mouth Daily., Disp: , Rfl:     ondansetron (ZOFRAN) 4 MG tablet, Take 4 mg by mouth once., Disp: , Rfl:     ondansetron (ZOFRAN) 8 MG tablet, Take 1 tablet (8 mg total) by mouth every 8 (eight) hours as needed for Nausea., Disp: 30 tablet, Rfl: 2    traMADoL (ULTRAM) 50 mg tablet, Take 50 mg by mouth once daily., Disp: , Rfl:     cholestyramine (QUESTRAN) 4 gram packet, Take 1 packet (4 g total) by mouth 2 (two) times daily., Disp: 20 packet, Rfl: 1    diphenoxylate-atropine 2.5-0.025 mg (LOMOTIL) 2.5-0.025 mg per tablet, Take 1 tablet by mouth 4 (four) times daily as needed for Diarrhea., Disp: 40 tablet, Rfl: 1  No current facility-administered medications for this visit.    Facility-Administered Medications Ordered in Other Visits:     electrolyte-S (ISOLYTE), , Intravenous, Continuous, Irwin Russo MD    fentaNYL 50 mcg/mL injection 25  "mcg, 25 mcg, Intravenous, Q5 Min PRN, Irwin Russo MD    HYDROmorphone (PF) injection 0.2 mg, 0.2 mg, Intravenous, Q5 Min PRN, Irwin Russo MD    lactated ringers infusion, , Intravenous, Continuous, Irwin Russo MD, Last Rate: 10 mL/hr at 08/01/22 1120, Restarted at 08/01/22 1234    LIDOcaine (PF) 10 mg/ml (1%) injection 10 mg, 1 mL, Intradermal, Once, Irwin Russo MD    oxyCODONE immediate release tablet 5 mg, 5 mg, Oral, Q3H PRN, Irwin Russo MD          Objective:   Vitals:  Blood pressure 138/75, pulse 102, temperature 98.2 °F (36.8 °C), resp. rate 18, height 5' 5.5" (1.664 m), weight 78.5 kg (173 lb).    Physical Examination:   GEN: no apparent distress, comfortable  HEAD: atraumatic and normocephalic  EYES: no pallor, no icterus  ENT:  no pharyngeal erythema, external ears WNL; no nasal discharge  NECK: no masses, thyroid normal, trachea midline, no LAD/LN's, supple  CV: RRR with no murmur; normal pulse; normal S1 and S2; no pedal edema  CHEST: Normal respiratory effort; CTAB; normal breath sounds; no wheeze or crackles  ABDOM: nontender and nondistended; soft;  no rebound/guarding, L/S NP  MUSC/Skeletal: ROM normal; no crepitus; joints normal  EXTREM: no clubbing, cyanosis, inflammation or swelling  SKIN: no rashes, lesions, ulcers, petechiae   : no cvat  NEURO: grossly intact; motor/sensory WNL;  no tremors  PSYCH: normal mood, affect and behavior  LYMPH: normal cervical, supraclavicular, axillary and LN's  BREAST: L breast NT, no D/C and no palpable mass  R chest wall NT, postop changes, no palpable mass, incision healed.  She has decreased ROM at R shoulder and arm.    CBC, CMP, TSH unremarkable    Assessment:   (1) 81 y.o. female with diagnosis of invasive ductal Ca with lobular features at 12 o'clock at L breast.  1.5 cm, Triple negative for ERP, PRP, H2N.  S/P L mastectomy Primary 1.5 cm, LN negative.    (2)Hx of R breast cancer 25 years ago.  R MRM, adjuvant chemotherapy,adjuvant " "tamoxifen?    (3)Discussed neoadjuvant chemotherapy with Carbo, taxotere or CTX, taxotere, or AC q 3 weeks x's 4-6 cycles.  Followed by L mastectomy and SNBx and adjuvant Rad Rx.  Or L mastectomy, SNBx and no Rad Rx necessary.    (3)Because of age, above medical problems, frail appearance; I am hesitant to go with neoadjuvant chemo initially,  She may not have the stamina for later surgery.    (4)I would go with surgery initially,and then after she recovers, will reconsider her later for adjuvant Rx.?    (5)Check PET for staging, given triple negative dx, R shoulder sx.  PET ANDERSON.    (6)S/P L mastectomy, SNBx. 5/26/22    Port placement completed, site healing.    Adjuvant Carbo, Taxotere q 3 weeks x's 4-6 cycles.  Udenyca support.  D1C1 8/10/22.    5'6"   177#    Undue toxicity.  S/P 1 cycle of chemo.  She does not agree to chemo #2.  RTC 2 weeks.                      "

## 2022-09-02 ENCOUNTER — LAB VISIT (OUTPATIENT)
Dept: LAB | Facility: CLINIC | Age: 82
End: 2022-09-02
Payer: MEDICARE

## 2022-09-02 DIAGNOSIS — Z17.1 MALIGNANT NEOPLASM OF CENTRAL PORTION OF LEFT BREAST IN FEMALE, ESTROGEN RECEPTOR NEGATIVE: ICD-10-CM

## 2022-09-02 DIAGNOSIS — C50.112 MALIGNANT NEOPLASM OF CENTRAL PORTION OF LEFT BREAST IN FEMALE, ESTROGEN RECEPTOR NEGATIVE: ICD-10-CM

## 2022-09-02 LAB
ALBUMIN SERPL BCP-MCNC: 3.3 G/DL (ref 3.5–5.2)
ALP SERPL-CCNC: 90 U/L (ref 55–135)
ALT SERPL W/O P-5'-P-CCNC: 14 U/L (ref 10–44)
ANION GAP SERPL CALC-SCNC: 7 MMOL/L (ref 8–16)
AST SERPL-CCNC: 19 U/L (ref 10–40)
BASOPHILS # BLD AUTO: 0.08 K/UL (ref 0–0.2)
BASOPHILS NFR BLD: 0.7 % (ref 0–1.9)
BILIRUB SERPL-MCNC: 0.2 MG/DL (ref 0.1–1)
BUN SERPL-MCNC: 23 MG/DL (ref 8–23)
CALCIUM SERPL-MCNC: 9.6 MG/DL (ref 8.7–10.5)
CHLORIDE SERPL-SCNC: 105 MMOL/L (ref 95–110)
CO2 SERPL-SCNC: 28 MMOL/L (ref 23–29)
CREAT SERPL-MCNC: 1.1 MG/DL (ref 0.5–1.4)
DIFFERENTIAL METHOD: ABNORMAL
EOSINOPHIL # BLD AUTO: 0.1 K/UL (ref 0–0.5)
EOSINOPHIL NFR BLD: 0.6 % (ref 0–8)
ERYTHROCYTE [DISTWIDTH] IN BLOOD BY AUTOMATED COUNT: 13.3 % (ref 11.5–14.5)
EST. GFR  (NO RACE VARIABLE): 51 ML/MIN/1.73 M^2
GLUCOSE SERPL-MCNC: 139 MG/DL (ref 70–110)
HCT VFR BLD AUTO: 36.1 % (ref 37–48.5)
HGB BLD-MCNC: 11.4 G/DL (ref 12–16)
IMM GRANULOCYTES # BLD AUTO: 0.11 K/UL (ref 0–0.04)
IMM GRANULOCYTES NFR BLD AUTO: 1 % (ref 0–0.5)
LYMPHOCYTES # BLD AUTO: 1.9 K/UL (ref 1–4.8)
LYMPHOCYTES NFR BLD: 17.7 % (ref 18–48)
MCH RBC QN AUTO: 31.8 PG (ref 27–31)
MCHC RBC AUTO-ENTMCNC: 31.6 G/DL (ref 32–36)
MCV RBC AUTO: 101 FL (ref 82–98)
MONOCYTES # BLD AUTO: 0.8 K/UL (ref 0.3–1)
MONOCYTES NFR BLD: 7.4 % (ref 4–15)
NEUTROPHILS # BLD AUTO: 7.9 K/UL (ref 1.8–7.7)
NEUTROPHILS NFR BLD: 72.6 % (ref 38–73)
NRBC BLD-RTO: 0 /100 WBC
PLATELET # BLD AUTO: 151 K/UL (ref 150–450)
PMV BLD AUTO: 11.8 FL (ref 9.2–12.9)
POTASSIUM SERPL-SCNC: 5.1 MMOL/L (ref 3.5–5.1)
PROT SERPL-MCNC: 6.6 G/DL (ref 6–8.4)
RBC # BLD AUTO: 3.58 M/UL (ref 4–5.4)
SODIUM SERPL-SCNC: 140 MMOL/L (ref 136–145)
WBC # BLD AUTO: 10.89 K/UL (ref 3.9–12.7)

## 2022-09-02 PROCEDURE — 80053 COMPREHEN METABOLIC PANEL: CPT | Performed by: INTERNAL MEDICINE

## 2022-09-02 PROCEDURE — 85025 COMPLETE CBC W/AUTO DIFF WBC: CPT | Performed by: INTERNAL MEDICINE

## 2022-09-02 PROCEDURE — 36415 PR COLLECTION VENOUS BLOOD,VENIPUNCTURE: ICD-10-PCS | Mod: PN,,, | Performed by: STUDENT IN AN ORGANIZED HEALTH CARE EDUCATION/TRAINING PROGRAM

## 2022-09-02 PROCEDURE — 36415 COLL VENOUS BLD VENIPUNCTURE: CPT | Mod: PN,,, | Performed by: STUDENT IN AN ORGANIZED HEALTH CARE EDUCATION/TRAINING PROGRAM

## 2022-09-06 ENCOUNTER — TELEPHONE (OUTPATIENT)
Dept: INFUSION THERAPY | Facility: HOSPITAL | Age: 82
End: 2022-09-06

## 2022-09-06 NOTE — TELEPHONE ENCOUNTER
Spoke with patient regarding missed appointment for today and she was under the impression Dr. Bullard was gong to cancel her treatments. She saw him and discussed no further treatments and asked that we can them all for her. Scheduling notified.

## 2022-09-07 ENCOUNTER — OFFICE VISIT (OUTPATIENT)
Dept: HEMATOLOGY/ONCOLOGY | Facility: CLINIC | Age: 82
End: 2022-09-07
Payer: MEDICARE

## 2022-09-07 VITALS
SYSTOLIC BLOOD PRESSURE: 121 MMHG | TEMPERATURE: 97 F | HEART RATE: 83 BPM | WEIGHT: 179.5 LBS | BODY MASS INDEX: 29.42 KG/M2 | DIASTOLIC BLOOD PRESSURE: 71 MMHG

## 2022-09-07 DIAGNOSIS — K52.1 DIARRHEA DUE TO DRUG: ICD-10-CM

## 2022-09-07 DIAGNOSIS — C50.112 MALIGNANT NEOPLASM OF CENTRAL PORTION OF LEFT BREAST IN FEMALE, ESTROGEN RECEPTOR NEGATIVE: Primary | ICD-10-CM

## 2022-09-07 DIAGNOSIS — C50.919 TRIPLE NEGATIVE MALIGNANT NEOPLASM OF BREAST: ICD-10-CM

## 2022-09-07 DIAGNOSIS — Z17.1 MALIGNANT NEOPLASM OF CENTRAL PORTION OF LEFT BREAST IN FEMALE, ESTROGEN RECEPTOR NEGATIVE: Primary | ICD-10-CM

## 2022-09-07 PROCEDURE — 3288F PR FALLS RISK ASSESSMENT DOCUMENTED: ICD-10-PCS | Mod: CPTII,S$GLB,, | Performed by: INTERNAL MEDICINE

## 2022-09-07 PROCEDURE — 1101F PT FALLS ASSESS-DOCD LE1/YR: CPT | Mod: CPTII,S$GLB,, | Performed by: INTERNAL MEDICINE

## 2022-09-07 PROCEDURE — 1125F PR PAIN SEVERITY QUANTIFIED, PAIN PRESENT: ICD-10-PCS | Mod: CPTII,S$GLB,, | Performed by: INTERNAL MEDICINE

## 2022-09-07 PROCEDURE — 1125F AMNT PAIN NOTED PAIN PRSNT: CPT | Mod: CPTII,S$GLB,, | Performed by: INTERNAL MEDICINE

## 2022-09-07 PROCEDURE — 3288F FALL RISK ASSESSMENT DOCD: CPT | Mod: CPTII,S$GLB,, | Performed by: INTERNAL MEDICINE

## 2022-09-07 PROCEDURE — 3074F SYST BP LT 130 MM HG: CPT | Mod: CPTII,S$GLB,, | Performed by: INTERNAL MEDICINE

## 2022-09-07 PROCEDURE — 1101F PR PT FALLS ASSESS DOC 0-1 FALLS W/OUT INJ PAST YR: ICD-10-PCS | Mod: CPTII,S$GLB,, | Performed by: INTERNAL MEDICINE

## 2022-09-07 PROCEDURE — 3078F PR MOST RECENT DIASTOLIC BLOOD PRESSURE < 80 MM HG: ICD-10-PCS | Mod: CPTII,S$GLB,, | Performed by: INTERNAL MEDICINE

## 2022-09-07 PROCEDURE — 3074F PR MOST RECENT SYSTOLIC BLOOD PRESSURE < 130 MM HG: ICD-10-PCS | Mod: CPTII,S$GLB,, | Performed by: INTERNAL MEDICINE

## 2022-09-07 PROCEDURE — 99213 PR OFFICE/OUTPT VISIT, EST, LEVL III, 20-29 MIN: ICD-10-PCS | Mod: S$GLB,,, | Performed by: INTERNAL MEDICINE

## 2022-09-07 PROCEDURE — 1159F MED LIST DOCD IN RCRD: CPT | Mod: CPTII,S$GLB,, | Performed by: INTERNAL MEDICINE

## 2022-09-07 PROCEDURE — 99213 OFFICE O/P EST LOW 20 MIN: CPT | Mod: S$GLB,,, | Performed by: INTERNAL MEDICINE

## 2022-09-07 PROCEDURE — 1159F PR MEDICATION LIST DOCUMENTED IN MEDICAL RECORD: ICD-10-PCS | Mod: CPTII,S$GLB,, | Performed by: INTERNAL MEDICINE

## 2022-09-07 PROCEDURE — 3078F DIAST BP <80 MM HG: CPT | Mod: CPTII,S$GLB,, | Performed by: INTERNAL MEDICINE

## 2022-09-07 NOTE — PROGRESS NOTES
[facility] In Office Hematology Oncology Subsequent Encounter Note    9/7/22 S      Subjective:      Patient ID:   Rosa Aceves  81 y.o. female  1940  Elliot Forrest LeBlanc      Chief Complaint:   L breast cancer    HPI:  81 y.o. female had R breast cancer in 1996, treated with R MRM at [facility] in [place], [place].  She had adjuvant chemotherapy, no adjuvant Rad Rx.  Not clear, if she had adjuvant Tamoxifen.    Now with abnormal L mammogram.  1.5 cm mass 12 o'clock posteriorly at L breast.  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  Triple negative for ERP, PRP, H2N.    She had L mastectomy, Sentinal node bx 7 weeks ago.  LN negative.  0/1 positive.  She is triple negative.  Check PET.  7/18/22 ANDERSON    RR estimated 25- 30% at 4-5 years out with observation.  Adjuvant chemo with carboplatin, taxotere q 3 weeks x's 4-6 cycles,  udenyca support, gives RR 15% expected.  Can defer adriamycin administration.    Discussed treatment schema, potential benefit in reducing recurrence risk,  Potential side effects including hair loss, N/V/D, peripheral neuropathy, pancytopenia.  And other side effects.  Other regimens include adriamycin, but she had this 25 years ago and tolerated it with difficulty.    Peripheral veins poor, port placement per Dr. Renzo Bullard.    Tamoxifen, Arimidex not helpful here given negative ERP and PRP status.  Herceptin not helpful here given negative H@N status.  Triple negative dx.    Port placed 8/1/22, site tender, edematous.  Too fresh to access the port site.  Move D1C1 to 8/10/22.    Check BRCA 1 & 2 on her.  Triple negative, Bilateral breast cancer.    Hgb 12.7 to 10, creatinine 1.4.  GFR 43%.  S/P first cycle of chemo.  She had much toxicity.  She does not agree to go forward with #2.    Abnormal cologuard, she need colonoscopy, when she is stronger.  She has Amydesis HHA.    She does not agree to resume further chemotherapy for her cancer condition.   She is stronger however and walking around the house with the assistance of her walker.  Appetite is improved and her weight has gone from 171-179 lb.  Her energy level is 4 to 5/10.  She takes Lomotil daily p.r.n..    Will discontinue the chemotherapy from her chart and will follow up with her in 6 weeks.        ROS:   GEN: normal without any fever, night sweats or weight loss  HEENT: normal with no HA's, sore throat, stiff neck, changes in vision  CV: normal with no CP, SOB, PND, FIGUEREDO or orthopnea  PULM: normal with no SOB, cough, hemoptysis, sputum or pleuritic pain  GI: normal with no abdominal pain, nausea, vomiting, constipation, diarrhea, melanotic stools, BRBPR, or hematemesis  : normal with no hematuria, dysuria  BREAST: See HPI  SKIN: normal with no rash, erythema, bruising, or swelling     Past Medical History:   Diagnosis Date    Aortic valve regurgitation     Benign paroxysmal vertigo, bilateral     Chronic kidney disease, unspecified     Coronary artery disease     Essential (primary) hypertension     Malignant neoplasm of right breast     Mitral valve regurgitation     Obesity, unspecified     Osteopenia     Positive colorectal cancer screening using Cologuard test     Rheumatoid arthritis, unspecified     Ruptured lumbar disc     Unspecified cataract      Past Surgical History:   Procedure Laterality Date    BREAST BIOPSY Left     BREAST SURGERY Left     CHOLECYSTECTOMY      EYE SURGERY      INJECTION FOR SENTINEL NODE IDENTIFICATION Left 5/26/2022    Procedure: INJECTION, FOR SENTINEL NODE IDENTIFICATION;  Surgeon: Renzo Jim MD;  Location: [facility] OR;  Service: General;  Laterality: Left;    INSERTION OF TUNNELED CENTRAL VENOUS CATHETER (CVC) WITH SUBCUTANEOUS PORT N/A 8/1/2022    Procedure: [id number];  Surgeon: Renzo Jim MD;  Location: [facility] OR;  Service: General;  Laterality: N/A;    MASTECTOMY Left 5/26/2022    Procedure: MASTECTOMY;  Surgeon: Renzo Jim MD;   Location: [facility] OR;  Service: General;  Laterality: Left;    MASTECTOMY, RADICAL Right     SENTINEL LYMPH NODE BIOPSY Left 5/26/2022    Procedure: BIOPSY, LYMPH NODE, SENTINEL;  Surgeon: Renzo Jim MD;  Location: [facility] OR;  Service: General;  Laterality: Left;    TUBAL LIGATION         Review of patient's allergies indicates:  No Known Allergies  Social History     Socioeconomic History    Marital status:     Number of children: 5   Tobacco Use    Smoking status: Never    Smokeless tobacco: Never   Substance and Sexual Activity    Alcohol use: Never    Drug use: Never    Sexual activity: Not Currently     Partners: Male         Current Outpatient Medications:     cholestyramine (QUESTRAN) 4 gram packet, Take 1 packet (4 g total) by mouth 2 (two) times daily., Disp: 20 packet, Rfl: 1    dexAMETHasone (DECADRON) 4 MG Tab, Take 8mg the AM and PM before chemotherapy and the AM and PM the day after chemotherapy. Repeat every 21 days., Disp: 120 tablet, Rfl: 1    diphenoxylate-atropine 2.5-0.025 mg (LOMOTIL) 2.5-0.025 mg per tablet, Take 1 tablet by mouth 4 (four) times daily as needed for Diarrhea., Disp: 40 tablet, Rfl: 1    furosemide (LASIX) 40 MG tablet, Take 40 mg by mouth once daily., Disp: , Rfl:     HYDROcodone-acetaminophen (NORCO) 5-325 mg per tablet, Take 1 tablet by mouth every 6 (six) hours as needed for Pain., Disp: 10 tablet, Rfl: 0    HYDROcodone-acetaminophen  mg/15 mL(15 mL) Soln, 1 tablet EVERY 6 HOURS (route: oral), Disp: , Rfl:     LIDOcaine-prilocaine (EMLA) cream, Apply topically as needed (once before port a cath is accessed)., Disp: 5 g, Rfl: 2    olmesartan-amLODIPin-hcthiazid 40-5-25 mg Tab, Take 1 tablet by mouth Daily., Disp: , Rfl:     ondansetron (ZOFRAN) 4 MG tablet, Take 4 mg by mouth once., Disp: , Rfl:     ondansetron (ZOFRAN) 8 MG tablet, Take 1 tablet (8 mg total) by mouth every 8 (eight) hours as needed for Nausea., Disp: 30 tablet, Rfl: 2    traMADoL (ULTRAM) 50  mg tablet, Take 50 mg by mouth once daily., Disp: , Rfl:   No current facility-administered medications for this visit.    Facility-Administered Medications Ordered in Other Visits:     electrolyte-S (ISOLYTE), , Intravenous, Continuous, Irwin Russo MD    fentaNYL 50 mcg/mL injection 25 mcg, 25 mcg, Intravenous, Q5 Min PRN, Irwin Russo MD    HYDROmorphone (PF) injection 0.2 mg, 0.2 mg, Intravenous, Q5 Min PRN, Irwin Russo MD    lactated ringers infusion, , Intravenous, Continuous, Irwin Russo MD, Last Rate: 10 mL/hr at 08/01/22 1120, Restarted at 08/01/22 1234    LIDOcaine (PF) 10 mg/ml (1%) injection 10 mg, 1 mL, Intradermal, Once, Irwin Russo MD    oxyCODONE immediate release tablet 5 mg, 5 mg, Oral, Q3H PRN, Irwin Russo MD          Objective:   Vitals:  Blood pressure 121/71, pulse 83, temperature 97.4 °F (36.3 °C), weight 81.4 kg (179 lb 8 oz).    Physical Examination:   GEN: no apparent distress, comfortable  HEAD: atraumatic and normocephalic  EYES: no pallor, no icterus  ENT:  no pharyngeal erythema, external ears WNL; no nasal discharge  NECK: no masses, thyroid normal, trachea midline, no LAD/LN's, supple  CV: RRR with no murmur; normal pulse; normal S1 and S2; no pedal edema  CHEST: Normal respiratory effort; CTAB; normal breath sounds; no wheeze or crackles  ABDOM: nontender and nondistended; soft;  no rebound/guarding, L/S NP  MUSC/Skeletal: ROM normal; no crepitus; joints normal  EXTREM: no clubbing, cyanosis, inflammation or swelling  SKIN: no rashes, lesions, ulcers, petechiae   : no cvat  NEURO: grossly intact; motor/sensory WNL;  no tremors  PSYCH: normal mood, affect and behavior  LYMPH: normal cervical, supraclavicular, axillary and LN's  BREAST: L breast NT, no D/C and no palpable mass  R chest wall NT, postop changes, no palpable mass, incision healed.  She has decreased ROM at R shoulder and arm.    CBC, CMP, TSH unremarkable    Assessment:   (1) 81 y.o. female with  "diagnosis of invasive ductal Ca with lobular features at 12 o'clock at L breast.  1.5 cm, Triple negative for ERP, PRP, H2N.  S/P L mastectomy Primary 1.5 cm, LN negative.    (2)Hx of R breast cancer 25 years ago.  R MRM, adjuvant chemotherapy,adjuvant tamoxifen?    (3)Discussed neoadjuvant chemotherapy with Carbo, taxotere or CTX, taxotere, or AC q 3 weeks x's 4-6 cycles.  Followed by L mastectomy and SNBx and adjuvant Rad Rx.  Or L mastectomy, SNBx and no Rad Rx necessary.    (3)Because of age, above medical problems, frail appearance; I am hesitant to go with neoadjuvant chemo initially,  She may not have the stamina for later surgery.    (4)I would go with surgery initially,and then after she recovers, will reconsider her later for adjuvant Rx.?    (5)Check PET for staging, given triple negative dx, R shoulder sx.  PET ANDERSON.    (6)S/P L mastectomy, SNBx. 5/26/22    Port placement completed, site healing.    Adjuvant Carbo, Taxotere q 3 weeks x's 4-6 cycles.  Udenyca support.  D1C1 8/10/22.    5'6"   177#    Undue toxicity.  S/P 1 cycle of chemo.  She does not agree to chemo #2.  She is doing better at this time and strengthening.  Return to clinic in 6 weeks.                        "

## 2022-09-17 ENCOUNTER — DOCUMENT SCAN (OUTPATIENT)
Dept: HOME HEALTH SERVICES | Facility: HOSPITAL | Age: 82
End: 2022-09-17
Payer: MEDICARE

## 2022-09-29 ENCOUNTER — OFFICE VISIT (OUTPATIENT)
Dept: PODIATRY | Facility: CLINIC | Age: 82
End: 2022-09-29
Payer: MEDICARE

## 2022-09-29 VITALS
HEART RATE: 72 BPM | SYSTOLIC BLOOD PRESSURE: 155 MMHG | OXYGEN SATURATION: 97 % | BODY MASS INDEX: 29.19 KG/M2 | HEIGHT: 66 IN | WEIGHT: 181.63 LBS | DIASTOLIC BLOOD PRESSURE: 67 MMHG

## 2022-09-29 DIAGNOSIS — L60.0 INGROWN NAIL: Primary | ICD-10-CM

## 2022-09-29 DIAGNOSIS — M20.41 HAMMER TOES OF BOTH FEET: ICD-10-CM

## 2022-09-29 DIAGNOSIS — I73.9 PERIPHERAL VASCULAR DISEASE: ICD-10-CM

## 2022-09-29 DIAGNOSIS — M20.42 HAMMER TOES OF BOTH FEET: ICD-10-CM

## 2022-09-29 PROCEDURE — 3288F PR FALLS RISK ASSESSMENT DOCUMENTED: ICD-10-PCS | Mod: CPTII,S$GLB,, | Performed by: PODIATRIST

## 2022-09-29 PROCEDURE — 3288F FALL RISK ASSESSMENT DOCD: CPT | Mod: CPTII,S$GLB,, | Performed by: PODIATRIST

## 2022-09-29 PROCEDURE — 3077F SYST BP >= 140 MM HG: CPT | Mod: CPTII,S$GLB,, | Performed by: PODIATRIST

## 2022-09-29 PROCEDURE — 99203 PR OFFICE/OUTPT VISIT, NEW, LEVL III, 30-44 MIN: ICD-10-PCS | Mod: S$GLB,,, | Performed by: PODIATRIST

## 2022-09-29 PROCEDURE — 99999 PR PBB SHADOW E&M-EST. PATIENT-LVL IV: CPT | Mod: PBBFAC,,, | Performed by: PODIATRIST

## 2022-09-29 PROCEDURE — 1160F RVW MEDS BY RX/DR IN RCRD: CPT | Mod: CPTII,S$GLB,, | Performed by: PODIATRIST

## 2022-09-29 PROCEDURE — 1160F PR REVIEW ALL MEDS BY PRESCRIBER/CLIN PHARMACIST DOCUMENTED: ICD-10-PCS | Mod: CPTII,S$GLB,, | Performed by: PODIATRIST

## 2022-09-29 PROCEDURE — 1126F PR PAIN SEVERITY QUANTIFIED, NO PAIN PRESENT: ICD-10-PCS | Mod: CPTII,S$GLB,, | Performed by: PODIATRIST

## 2022-09-29 PROCEDURE — 1101F PT FALLS ASSESS-DOCD LE1/YR: CPT | Mod: CPTII,S$GLB,, | Performed by: PODIATRIST

## 2022-09-29 PROCEDURE — 99999 PR PBB SHADOW E&M-EST. PATIENT-LVL IV: ICD-10-PCS | Mod: PBBFAC,,, | Performed by: PODIATRIST

## 2022-09-29 PROCEDURE — 1159F PR MEDICATION LIST DOCUMENTED IN MEDICAL RECORD: ICD-10-PCS | Mod: CPTII,S$GLB,, | Performed by: PODIATRIST

## 2022-09-29 PROCEDURE — 3078F DIAST BP <80 MM HG: CPT | Mod: CPTII,S$GLB,, | Performed by: PODIATRIST

## 2022-09-29 PROCEDURE — 99203 OFFICE O/P NEW LOW 30 MIN: CPT | Mod: S$GLB,,, | Performed by: PODIATRIST

## 2022-09-29 PROCEDURE — 1126F AMNT PAIN NOTED NONE PRSNT: CPT | Mod: CPTII,S$GLB,, | Performed by: PODIATRIST

## 2022-09-29 PROCEDURE — 3078F PR MOST RECENT DIASTOLIC BLOOD PRESSURE < 80 MM HG: ICD-10-PCS | Mod: CPTII,S$GLB,, | Performed by: PODIATRIST

## 2022-09-29 PROCEDURE — 1101F PR PT FALLS ASSESS DOC 0-1 FALLS W/OUT INJ PAST YR: ICD-10-PCS | Mod: CPTII,S$GLB,, | Performed by: PODIATRIST

## 2022-09-29 PROCEDURE — 3077F PR MOST RECENT SYSTOLIC BLOOD PRESSURE >= 140 MM HG: ICD-10-PCS | Mod: CPTII,S$GLB,, | Performed by: PODIATRIST

## 2022-09-29 PROCEDURE — 1159F MED LIST DOCD IN RCRD: CPT | Mod: CPTII,S$GLB,, | Performed by: PODIATRIST

## 2022-10-02 PROBLEM — M20.42 HAMMER TOES OF BOTH FEET: Status: ACTIVE | Noted: 2022-10-02

## 2022-10-02 PROBLEM — M20.41 HAMMER TOES OF BOTH FEET: Status: ACTIVE | Noted: 2022-10-02

## 2022-10-02 PROBLEM — L60.0 INGROWN NAIL: Status: ACTIVE | Noted: 2022-10-02

## 2022-10-02 NOTE — PROGRESS NOTES
Subjective:       Patient ID: Rosa Aceves is a 81 y.o. female.    Chief Complaint: Nail Problem  Patient presents today with a family member she is complaining about painfully ingrown toenails on both feet she also has severe hammertoes her toes are rubbing together and causing irritation patient was treated for breast cancer in May of this year.  Patient's family member states that they had no idea that the patient's toenails were so long and so ingrown.    Past Medical History:   Diagnosis Date    Aortic valve regurgitation     Benign paroxysmal vertigo, bilateral     Chronic kidney disease, unspecified     Coronary artery disease     Essential (primary) hypertension     Malignant neoplasm of right breast     Mitral valve regurgitation     Obesity, unspecified     Osteopenia     Positive colorectal cancer screening using Cologuard test     Rheumatoid arthritis, unspecified     Ruptured lumbar disc     Unspecified cataract      Past Surgical History:   Procedure Laterality Date    BREAST BIOPSY Left     BREAST SURGERY Left     CHOLECYSTECTOMY      EYE SURGERY      INJECTION FOR SENTINEL NODE IDENTIFICATION Left 5/26/2022    Procedure: INJECTION, FOR SENTINEL NODE IDENTIFICATION;  Surgeon: Renzo Jim MD;  Location: [facility] OR;  Service: General;  Laterality: Left;    INSERTION OF TUNNELED CENTRAL VENOUS CATHETER (CVC) WITH SUBCUTANEOUS PORT N/A 8/1/2022    Procedure: [id number];  Surgeon: Renzo Jim MD;  Location: [facility] OR;  Service: General;  Laterality: N/A;    MASTECTOMY Left 5/26/2022    Procedure: MASTECTOMY;  Surgeon: Renzo Jim MD;  Location: [facility] OR;  Service: General;  Laterality: Left;    MASTECTOMY, RADICAL Right     SENTINEL LYMPH NODE BIOPSY Left 5/26/2022    Procedure: BIOPSY, LYMPH NODE, SENTINEL;  Surgeon: Renzo Jim MD;  Location: [facility] OR;  Service: General;  Laterality: Left;    TUBAL LIGATION       Family History   Problem Relation Age of Onset     Cancer Mother     Heart failure Father     Cancer Sister     Cancer Grandchild      Social History     Socioeconomic History    Marital status:     Number of children: 5   Tobacco Use    Smoking status: Never    Smokeless tobacco: Never   Substance and Sexual Activity    Alcohol use: Never    Drug use: Never    Sexual activity: Not Currently     Partners: Male       Current Outpatient Medications   Medication Sig Dispense Refill    cholestyramine (QUESTRAN) 4 gram packet Take 1 packet (4 g total) by mouth 2 (two) times daily. 20 packet 1    dexAMETHasone (DECADRON) 4 MG Tab Take 8mg the AM and PM before chemotherapy and the AM and PM the day after chemotherapy. Repeat every 21 days. 120 tablet 1    diphenoxylate-atropine 2.5-0.025 mg (LOMOTIL) 2.5-0.025 mg per tablet Take 1 tablet by mouth 4 (four) times daily as needed for Diarrhea. 40 tablet 1    furosemide (LASIX) 40 MG tablet Take 40 mg by mouth once daily.      HYDROcodone-acetaminophen (NORCO) 5-325 mg per tablet Take 1 tablet by mouth every 6 (six) hours as needed for Pain. 10 tablet 0    olmesartan-amLODIPin-hcthiazid 40-5-25 mg Tab Take 1 tablet by mouth Daily.      ondansetron (ZOFRAN) 4 MG tablet Take 4 mg by mouth once.      ondansetron (ZOFRAN) 8 MG tablet Take 1 tablet (8 mg total) by mouth every 8 (eight) hours as needed for Nausea. 30 tablet 2    traMADoL (ULTRAM) 50 mg tablet Take 50 mg by mouth once daily.      HYDROcodone-acetaminophen  mg/15 mL(15 mL) Soln 1 tablet EVERY 6 HOURS (route: oral)      LIDOcaine-prilocaine (EMLA) cream Apply topically as needed (once before port a cath is accessed). (Patient not taking: Reported on 9/29/2022) 5 g 2     No current facility-administered medications for this visit.     Facility-Administered Medications Ordered in Other Visits   Medication Dose Route Frequency Provider Last Rate Last Admin    electrolyte-S (ISOLYTE)   Intravenous Continuous Irwin Russo MD        fentaNYL 50 mcg/mL  "injection 25 mcg  25 mcg Intravenous Q5 Min PRN Irwin Russo MD        HYDROmorphone (PF) injection 0.2 mg  0.2 mg Intravenous Q5 Min PRN Irwin Russo MD        lactated ringers infusion   Intravenous Continuous Irwin Russo MD 10 mL/hr at 08/01/22 1120 Restarted at 08/01/22 1234    LIDOcaine (PF) 10 mg/ml (1%) injection 10 mg  1 mL Intradermal Once Irwin Russo MD        oxyCODONE immediate release tablet 5 mg  5 mg Oral Q3H PRN Irwin Russo MD         Review of patient's allergies indicates:  No Known Allergies    Review of Systems   Musculoskeletal:  Positive for arthralgias.   All other systems reviewed and are negative.    Objective:      Vitals:    09/29/22 1048   BP: (!) 155/67   Pulse: 72   SpO2: 97%   Weight: 82.4 kg (181 lb 9.6 oz)   Height: 5' 5.5" (1.664 m)     Physical Exam  Vitals and nursing note reviewed. Exam conducted with a chaperone present.   Constitutional:       Appearance: Normal appearance.   Cardiovascular:      Pulses:           Dorsalis pedis pulses are 1+ on the right side and 1+ on the left side.        Posterior tibial pulses are 0 on the right side and 0 on the left side.   Pulmonary:      Effort: Pulmonary effort is normal.   Musculoskeletal:         General: Tenderness and deformity present.   Feet:      Right foot:      Protective Sensation: 2 sites tested.  2 sites sensed.      Skin integrity: Erythema present.      Toenail Condition: Right toenails are abnormally thick, long and ingrown. Fungal disease present.     Left foot:      Protective Sensation: 2 sites tested.  2 sites sensed.      Skin integrity: Erythema present.      Toenail Condition: Left toenails are abnormally thick, long and ingrown. Fungal disease present.  Skin:     General: Skin is warm.      Capillary Refill: Capillary refill takes more than 3 seconds.      Findings: Erythema present.   Neurological:      General: No focal deficit present.      Mental Status: She is alert.   Psychiatric:         " Mood and Affect: Mood normal.         Behavior: Behavior normal.                                  Assessment:       1. Ingrown nail    2. Hammer toes of both feet    3. Peripheral vascular disease          Plan:       Patient presents today with a family member she is complaining about painfully ingrown toenails on both feet she also has severe hammertoes her toes are rubbing together and causing irritation patient was treated for breast cancer in May of this year.  Patient's family member states that they had no idea that the patient's toenails were so long and so ingrown.  A comprehensive new patient evaluation was performed today patient does have severely contracted digits bilateral the patient also has very prominent metatarsal heads and excessively dry skin I explained to the patient the patient's family member the patient's skin needs to be kept well hydrated to prevent areas of breakdown and potential ulceration as well as callus formation.  Patient had severely elongated ingrowing toenails on multiple digits bilateral while the patient did not need a nail avulsion I was able to aggressively trim and remove the ingrowing toenails which the patient tolerated well patient noted to me to be immediate relief upon removing the ingrown toenail this was especially noticeable on the bilateral hallux.  Patient does have irritation 5th digit left and needs to make sure she is wearing shoes that accommodate for the digital contracture this irritation could certainly lead to breakdown ulceration which can lead to complication with the patient's peripheral vascular disease.  Recommended follow-up will be as needed if the patient is not getting complete relief of the ingrowing toenails in the next 4-5 days to contact us for follow-up further evaluation treatment but I do recommend keeping the patient's skin well moisturized well hydrated on a daily basis.  Total time including discussion evaluation treatment discussion of  treatment options treatment plan removal of ingrown toenails as well as comprehensive new patient exam and documentation equaled 30 minutes.This note was created using [employer] voice recognition software that occasionally misinterpreted phrases or words.

## 2022-10-19 ENCOUNTER — OFFICE VISIT (OUTPATIENT)
Dept: HEMATOLOGY/ONCOLOGY | Facility: CLINIC | Age: 82
End: 2022-10-19
Payer: MEDICARE

## 2022-10-19 VITALS
DIASTOLIC BLOOD PRESSURE: 80 MMHG | TEMPERATURE: 97 F | SYSTOLIC BLOOD PRESSURE: 151 MMHG | WEIGHT: 175.81 LBS | BODY MASS INDEX: 28.26 KG/M2 | HEART RATE: 78 BPM | HEIGHT: 66 IN | RESPIRATION RATE: 16 BRPM

## 2022-10-19 DIAGNOSIS — Z17.1 MALIGNANT NEOPLASM OF CENTRAL PORTION OF LEFT BREAST IN FEMALE, ESTROGEN RECEPTOR NEGATIVE: Primary | ICD-10-CM

## 2022-10-19 DIAGNOSIS — C50.112 MALIGNANT NEOPLASM OF CENTRAL PORTION OF LEFT BREAST IN FEMALE, ESTROGEN RECEPTOR NEGATIVE: Primary | ICD-10-CM

## 2022-10-19 PROCEDURE — 3079F DIAST BP 80-89 MM HG: CPT | Mod: CPTII,S$GLB,, | Performed by: INTERNAL MEDICINE

## 2022-10-19 PROCEDURE — 3288F PR FALLS RISK ASSESSMENT DOCUMENTED: ICD-10-PCS | Mod: CPTII,S$GLB,, | Performed by: INTERNAL MEDICINE

## 2022-10-19 PROCEDURE — 1159F PR MEDICATION LIST DOCUMENTED IN MEDICAL RECORD: ICD-10-PCS | Mod: CPTII,S$GLB,, | Performed by: INTERNAL MEDICINE

## 2022-10-19 PROCEDURE — 3077F SYST BP >= 140 MM HG: CPT | Mod: CPTII,S$GLB,, | Performed by: INTERNAL MEDICINE

## 2022-10-19 PROCEDURE — 3079F PR MOST RECENT DIASTOLIC BLOOD PRESSURE 80-89 MM HG: ICD-10-PCS | Mod: CPTII,S$GLB,, | Performed by: INTERNAL MEDICINE

## 2022-10-19 PROCEDURE — 1101F PR PT FALLS ASSESS DOC 0-1 FALLS W/OUT INJ PAST YR: ICD-10-PCS | Mod: CPTII,S$GLB,, | Performed by: INTERNAL MEDICINE

## 2022-10-19 PROCEDURE — 1101F PT FALLS ASSESS-DOCD LE1/YR: CPT | Mod: CPTII,S$GLB,, | Performed by: INTERNAL MEDICINE

## 2022-10-19 PROCEDURE — 99213 PR OFFICE/OUTPT VISIT, EST, LEVL III, 20-29 MIN: ICD-10-PCS | Mod: S$GLB,,, | Performed by: INTERNAL MEDICINE

## 2022-10-19 PROCEDURE — 99213 OFFICE O/P EST LOW 20 MIN: CPT | Mod: S$GLB,,, | Performed by: INTERNAL MEDICINE

## 2022-10-19 PROCEDURE — 3077F PR MOST RECENT SYSTOLIC BLOOD PRESSURE >= 140 MM HG: ICD-10-PCS | Mod: CPTII,S$GLB,, | Performed by: INTERNAL MEDICINE

## 2022-10-19 PROCEDURE — 1159F MED LIST DOCD IN RCRD: CPT | Mod: CPTII,S$GLB,, | Performed by: INTERNAL MEDICINE

## 2022-10-19 PROCEDURE — 1126F AMNT PAIN NOTED NONE PRSNT: CPT | Mod: CPTII,S$GLB,, | Performed by: INTERNAL MEDICINE

## 2022-10-19 PROCEDURE — 1126F PR PAIN SEVERITY QUANTIFIED, NO PAIN PRESENT: ICD-10-PCS | Mod: CPTII,S$GLB,, | Performed by: INTERNAL MEDICINE

## 2022-10-19 PROCEDURE — 3288F FALL RISK ASSESSMENT DOCD: CPT | Mod: CPTII,S$GLB,, | Performed by: INTERNAL MEDICINE

## 2022-10-19 RX ORDER — ETODOLAC 300 MG/1
300 CAPSULE ORAL DAILY
Qty: 30 CAPSULE | Refills: 1 | Status: SHIPPED | OUTPATIENT
Start: 2022-10-19 | End: 2023-01-03

## 2022-10-19 NOTE — PROGRESS NOTES
[facility] In Office Hematology Oncology Subsequent Encounter Note    10/19/22    Subjective:      Patient ID:   Rosa Aceves  81 y.o. female  1940  [place]      Chief Complaint:   L breast cancer    HPI:  81 y.o. female had R breast cancer in 1996, treated with R MRM at [facility] in [place], [place].  She had adjuvant chemotherapy, no adjuvant Rad Rx.  Not clear, if she had adjuvant Tamoxifen.    Now with abnormal L mammogram.  1.5 cm mass 12 o'clock posteriorly at L breast.  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  Triple negative for ERP, PRP, H2N.    She had L mastectomy, Sentinal node bx 7 weeks ago.  LN negative.  0/1 positive.  She is triple negative.  Check PET.  7/18/22 [facility]    RR estimated 25- 30% at 4-5 years out with observation.  Adjuvant chemo with carboplatin, taxotere q 3 weeks x's 4-6 cycles,  udenyca support, gives RR 15% expected.  Can defer adriamycin administration.    Discussed treatment schema, potential benefit in reducing recurrence risk,  Potential side effects including hair loss, N/V/D, peripheral neuropathy, pancytopenia.  And other side effects.  Other regimens include adriamycin, but she had this 25 years ago and tolerated it with difficulty.    Peripheral veins poor, port placement per Dr. Renzo Bullard.    Tamoxifen, Arimidex not helpful here given negative ERP and PRP status.  Herceptin not helpful here given negative H@N status.  Triple negative dx.    Port placed 8/1/22, site tender, edematous.  Too fresh to access the port site.  Move D1C1 to 8/10/22.    Check BRCA 1 & 2 on her.  Triple negative, Bilateral breast cancer.    Hgb 12.7 to 10, creatinine 1.4.  GFR 43%.  S/P first cycle of chemo.  She had much toxicity.  She does not agree to go forward with #2.    Abnormal cologuard, she need colonoscopy, when she is stronger.  She has Amydesis HHA.    She does not agree to resume further chemotherapy for her cancer condition.  She  is stronger however and walking around the house with the assistance of her walker.  Appetite is improved and her weight has gone from 171-179 lb.  Her energy level is 4 to 5/10.  She takes Lomotil daily p.r.n..    Will discontinue the chemotherapy from her chart.  Off chemotherapy x's 6-8 weeks, she did not tolerate the Rx.  Stamina level is improved at 6/10.  We are going with observation.    C/O pain at R anterior leg just below the knee.  DJD hx.  Add trial of lodine 300 mg 1 daily.        ROS:   GEN: normal without any fever, night sweats or weight loss  HEENT: normal with no HA's, sore throat, stiff neck, changes in vision  CV: normal with no CP, SOB, PND, FIGUEREDO or orthopnea  PULM: normal with no SOB, cough, hemoptysis, sputum or pleuritic pain  GI: normal with no abdominal pain, nausea, vomiting, constipation, diarrhea, melanotic stools, BRBPR, or hematemesis  : normal with no hematuria, dysuria  BREAST: See HPI  SKIN: normal with no rash, erythema, bruising, or swelling     Past Medical History:   Diagnosis Date    Aortic valve regurgitation     Benign paroxysmal vertigo, bilateral     Chronic kidney disease, unspecified     Coronary artery disease     Essential (primary) hypertension     Malignant neoplasm of right breast     Mitral valve regurgitation     Obesity, unspecified     Osteopenia     Positive colorectal cancer screening using Cologuard test     Rheumatoid arthritis, unspecified     Ruptured lumbar disc     Unspecified cataract      Past Surgical History:   Procedure Laterality Date    BREAST BIOPSY Left     BREAST SURGERY Left     CHOLECYSTECTOMY      EYE SURGERY      INJECTION FOR SENTINEL NODE IDENTIFICATION Left 5/26/2022    Procedure: INJECTION, FOR SENTINEL NODE IDENTIFICATION;  Surgeon: Renzo Jim MD;  Location: [facility];  Service: General;  Laterality: Left;    INSERTION OF TUNNELED CENTRAL VENOUS CATHETER (CVC) WITH SUBCUTANEOUS PORT N/A 8/1/2022    Procedure:  [id number];  Surgeon: Renzo Jim MD;  Location: [facility] OR;  Service: General;  Laterality: N/A;    MASTECTOMY Left 5/26/2022    Procedure: MASTECTOMY;  Surgeon: Renzo Jim MD;  Location: [facility] OR;  Service: General;  Laterality: Left;    MASTECTOMY, RADICAL Right     SENTINEL LYMPH NODE BIOPSY Left 5/26/2022    Procedure: BIOPSY, LYMPH NODE, SENTINEL;  Surgeon: Renzo Jim MD;  Location: [facility] OR;  Service: General;  Laterality: Left;    TUBAL LIGATION         Review of patient's allergies indicates:  No Known Allergies  Social History     Socioeconomic History    Marital status:     Number of children: 5   Tobacco Use    Smoking status: Never    Smokeless tobacco: Never   Substance and Sexual Activity    Alcohol use: Never    Drug use: Never    Sexual activity: Not Currently     Partners: Male         Current Outpatient Medications:     cholestyramine (QUESTRAN) 4 gram packet, Take 1 packet (4 g total) by mouth 2 (two) times daily., Disp: 20 packet, Rfl: 1    dexAMETHasone (DECADRON) 4 MG Tab, Take 8mg the AM and PM before chemotherapy and the AM and PM the day after chemotherapy. Repeat every 21 days., Disp: 120 tablet, Rfl: 1    diphenoxylate-atropine 2.5-0.025 mg (LOMOTIL) 2.5-0.025 mg per tablet, Take 1 tablet by mouth 4 (four) times daily as needed for Diarrhea., Disp: 40 tablet, Rfl: 1    furosemide (LASIX) 40 MG tablet, Take 40 mg by mouth once daily., Disp: , Rfl:     HYDROcodone-acetaminophen (NORCO) 5-325 mg per tablet, Take 1 tablet by mouth every 6 (six) hours as needed for Pain., Disp: 10 tablet, Rfl: 0    HYDROcodone-acetaminophen  mg/15 mL(15 mL) Soln, 1 tablet EVERY 6 HOURS (route: oral), Disp: , Rfl:     LIDOcaine-prilocaine (EMLA) cream, Apply topically as needed (once before port a cath is accessed)., Disp: 5 g, Rfl: 2    olmesartan-amLODIPin-hcthiazid 40-5-25 mg Tab, Take 1 tablet by mouth Daily., Disp: , Rfl:     ondansetron (ZOFRAN) 4 MG tablet,  "Take 4 mg by mouth once., Disp: , Rfl:     ondansetron (ZOFRAN) 8 MG tablet, Take 1 tablet (8 mg total) by mouth every 8 (eight) hours as needed for Nausea., Disp: 30 tablet, Rfl: 2    traMADoL (ULTRAM) 50 mg tablet, Take 50 mg by mouth once daily., Disp: , Rfl:     etodolac (LODINE) 300 MG Cap, Take 1 capsule (300 mg total) by mouth once daily., Disp: 30 capsule, Rfl: 1  No current facility-administered medications for this visit.    Facility-Administered Medications Ordered in Other Visits:     electrolyte-S (ISOLYTE), , Intravenous, Continuous, Irwin Russo MD    fentaNYL 50 mcg/mL injection 25 mcg, 25 mcg, Intravenous, Q5 Min PRN, Irwin Russo MD    HYDROmorphone (PF) injection 0.2 mg, 0.2 mg, Intravenous, Q5 Min PRN, Irwin Russo MD    lactated ringers infusion, , Intravenous, Continuous, Irwin Russo MD, Last Rate: 10 mL/hr at 08/01/22 1120, Restarted at 08/01/22 1234    LIDOcaine (PF) 10 mg/ml (1%) injection 10 mg, 1 mL, Intradermal, Once, Irwin Russo MD    oxyCODONE immediate release tablet 5 mg, 5 mg, Oral, Q3H PRN, Irwin Russo MD          Objective:   Vitals:  Blood pressure (!) 151/80, pulse 78, temperature 97.3 °F (36.3 °C), resp. rate 16, height 5' 5.5" (1.664 m), weight 79.7 kg (175 lb 12.8 oz).    Physical Examination:   GEN: no apparent distress, comfortable  HEAD: atraumatic and normocephalic  EYES: no pallor, no icterus  ENT:  no pharyngeal erythema, external ears WNL; no nasal discharge  NECK: no masses, thyroid normal, trachea midline, no LAD/LN's, supple  CV: RRR with no murmur; normal pulse; normal S1 and S2; no pedal edema  CHEST: Normal respiratory effort; CTAB; normal breath sounds; no wheeze or crackles  ABDOM: nontender and nondistended; soft;  no rebound/guarding, L/S NP  MUSC/Skeletal: ROM normal; no crepitus; joints normal  EXTREM: no clubbing, cyanosis, inflammation or swelling  SKIN: no rashes, lesions, ulcers, petechiae   : no cvat  NEURO: grossly intact; " "motor/sensory WNL;  no tremors  PSYCH: normal mood, affect and behavior  LYMPH: normal cervical, supraclavicular, axillary and LN's  BREAST: L breast NT, no D/C and no palpable mass  R chest wall NT, postop changes, no palpable mass, incision healed.  She has decreased ROM at R shoulder and arm.    CBC, CMP, TSH unremarkable    Assessment:   (1) 81 y.o. female with diagnosis of invasive ductal Ca with lobular features at 12 o'clock at L breast.  1.5 cm, Triple negative for ERP, PRP, H2N.  S/P L mastectomy Primary 1.5 cm, LN negative.    (2)Hx of R breast cancer 25 years ago.  R MRM, adjuvant chemotherapy,adjuvant tamoxifen?    (3)Discussed neoadjuvant chemotherapy with Carbo, taxotere or CTX, taxotere, or AC q 3 weeks x's 4-6 cycles.  Followed by L mastectomy and SNBx and adjuvant Rad Rx.  Or L mastectomy, SNBx and no Rad Rx necessary.    (3)Because of age, above medical problems, frail appearance; I am hesitant to go with neoadjuvant chemo initially,  She may not have the stamina for later surgery.    (4)I would go with surgery initially,and then after she recovers, will reconsider her later for adjuvant Rx.?    (5)Check PET for staging, given triple negative dx, R shoulder sx.  PET [facility].    (6)S/P L mastectomy, SNBx. 5/26/22    Port placement completed, site healing.    Adjuvant Carbo, Taxotere q 3 weeks x's 4-6 cycles.  Udenyca support.  D1C1 8/10/22.    5'6"   177#    Undue toxicity.  S/P 1 cycle of chemo.  She does not agree to chemo #2.  She is doing better at this time and strengthening.      (7)DJD R knee, trial of lodine 300 mg 1 daily with food.    RTC 4 weeks.                          "

## 2022-11-15 ENCOUNTER — INFUSION (OUTPATIENT)
Dept: INFUSION THERAPY | Facility: HOSPITAL | Age: 82
End: 2022-11-15
Attending: INTERNAL MEDICINE
Payer: MEDICARE

## 2022-11-15 ENCOUNTER — OFFICE VISIT (OUTPATIENT)
Dept: HEMATOLOGY/ONCOLOGY | Facility: CLINIC | Age: 82
End: 2022-11-15
Payer: MEDICARE

## 2022-11-15 VITALS
WEIGHT: 180 LBS | TEMPERATURE: 97 F | OXYGEN SATURATION: 95 % | BODY MASS INDEX: 29.5 KG/M2 | HEART RATE: 85 BPM | DIASTOLIC BLOOD PRESSURE: 68 MMHG | RESPIRATION RATE: 18 BRPM | SYSTOLIC BLOOD PRESSURE: 162 MMHG

## 2022-11-15 VITALS
SYSTOLIC BLOOD PRESSURE: 162 MMHG | BODY MASS INDEX: 28.93 KG/M2 | TEMPERATURE: 97 F | DIASTOLIC BLOOD PRESSURE: 68 MMHG | HEIGHT: 66 IN | WEIGHT: 180 LBS | HEART RATE: 85 BPM | RESPIRATION RATE: 18 BRPM

## 2022-11-15 DIAGNOSIS — C50.112 MALIGNANT NEOPLASM OF CENTRAL PORTION OF LEFT BREAST IN FEMALE, ESTROGEN RECEPTOR NEGATIVE: Primary | ICD-10-CM

## 2022-11-15 DIAGNOSIS — M25.50 ARTHRALGIA, UNSPECIFIED JOINT: Primary | ICD-10-CM

## 2022-11-15 DIAGNOSIS — C50.919 TRIPLE NEGATIVE MALIGNANT NEOPLASM OF BREAST: ICD-10-CM

## 2022-11-15 DIAGNOSIS — Z17.1 MALIGNANT NEOPLASM OF CENTRAL PORTION OF LEFT BREAST IN FEMALE, ESTROGEN RECEPTOR NEGATIVE: Primary | ICD-10-CM

## 2022-11-15 PROCEDURE — 3078F PR MOST RECENT DIASTOLIC BLOOD PRESSURE < 80 MM HG: ICD-10-PCS | Mod: CPTII,S$GLB,, | Performed by: INTERNAL MEDICINE

## 2022-11-15 PROCEDURE — 1101F PR PT FALLS ASSESS DOC 0-1 FALLS W/OUT INJ PAST YR: ICD-10-PCS | Mod: CPTII,S$GLB,, | Performed by: INTERNAL MEDICINE

## 2022-11-15 PROCEDURE — 25000003 PHARM REV CODE 250: Performed by: INTERNAL MEDICINE

## 2022-11-15 PROCEDURE — 3077F PR MOST RECENT SYSTOLIC BLOOD PRESSURE >= 140 MM HG: ICD-10-PCS | Mod: CPTII,S$GLB,, | Performed by: INTERNAL MEDICINE

## 2022-11-15 PROCEDURE — 1101F PT FALLS ASSESS-DOCD LE1/YR: CPT | Mod: CPTII,S$GLB,, | Performed by: INTERNAL MEDICINE

## 2022-11-15 PROCEDURE — 1125F AMNT PAIN NOTED PAIN PRSNT: CPT | Mod: CPTII,S$GLB,, | Performed by: INTERNAL MEDICINE

## 2022-11-15 PROCEDURE — 3288F PR FALLS RISK ASSESSMENT DOCUMENTED: ICD-10-PCS | Mod: CPTII,S$GLB,, | Performed by: INTERNAL MEDICINE

## 2022-11-15 PROCEDURE — 63600175 PHARM REV CODE 636 W HCPCS: Performed by: INTERNAL MEDICINE

## 2022-11-15 PROCEDURE — 99213 OFFICE O/P EST LOW 20 MIN: CPT | Mod: S$GLB,,, | Performed by: INTERNAL MEDICINE

## 2022-11-15 PROCEDURE — 3077F SYST BP >= 140 MM HG: CPT | Mod: CPTII,S$GLB,, | Performed by: INTERNAL MEDICINE

## 2022-11-15 PROCEDURE — A4216 STERILE WATER/SALINE, 10 ML: HCPCS | Performed by: INTERNAL MEDICINE

## 2022-11-15 PROCEDURE — 1125F PR PAIN SEVERITY QUANTIFIED, PAIN PRESENT: ICD-10-PCS | Mod: CPTII,S$GLB,, | Performed by: INTERNAL MEDICINE

## 2022-11-15 PROCEDURE — 96523 IRRIG DRUG DELIVERY DEVICE: CPT

## 2022-11-15 PROCEDURE — 99213 PR OFFICE/OUTPT VISIT, EST, LEVL III, 20-29 MIN: ICD-10-PCS | Mod: S$GLB,,, | Performed by: INTERNAL MEDICINE

## 2022-11-15 PROCEDURE — 3288F FALL RISK ASSESSMENT DOCD: CPT | Mod: CPTII,S$GLB,, | Performed by: INTERNAL MEDICINE

## 2022-11-15 PROCEDURE — 1159F PR MEDICATION LIST DOCUMENTED IN MEDICAL RECORD: ICD-10-PCS | Mod: CPTII,S$GLB,, | Performed by: INTERNAL MEDICINE

## 2022-11-15 PROCEDURE — 1159F MED LIST DOCD IN RCRD: CPT | Mod: CPTII,S$GLB,, | Performed by: INTERNAL MEDICINE

## 2022-11-15 PROCEDURE — 3078F DIAST BP <80 MM HG: CPT | Mod: CPTII,S$GLB,, | Performed by: INTERNAL MEDICINE

## 2022-11-15 RX ORDER — SODIUM CHLORIDE 0.9 % (FLUSH) 0.9 %
10 SYRINGE (ML) INJECTION
Status: DISCONTINUED | OUTPATIENT
Start: 2022-11-15 | End: 2022-11-15 | Stop reason: HOSPADM

## 2022-11-15 RX ORDER — HEPARIN 100 UNIT/ML
500 SYRINGE INTRAVENOUS
Status: DISCONTINUED | OUTPATIENT
Start: 2022-11-15 | End: 2022-11-15 | Stop reason: HOSPADM

## 2022-11-15 RX ORDER — SODIUM CHLORIDE 0.9 % (FLUSH) 0.9 %
10 SYRINGE (ML) INJECTION
Status: CANCELLED | OUTPATIENT
Start: 2022-11-15

## 2022-11-15 RX ORDER — HEPARIN 100 UNIT/ML
500 SYRINGE INTRAVENOUS
Status: CANCELLED | OUTPATIENT
Start: 2022-11-15

## 2022-11-15 RX ADMIN — HEPARIN 500 UNITS: 100 SYRINGE at 04:11

## 2022-11-15 RX ADMIN — SODIUM CHLORIDE, PRESERVATIVE FREE 10 ML: 5 INJECTION INTRAVENOUS at 04:11

## 2022-11-15 NOTE — PROGRESS NOTES
[facility] In Office Hematology Oncology Subsequent Encounter Note    11/15/22    Subjective:      Patient ID:   Rosa Aceves  81 y.o. female  1940  Elliot Forrest LeBlanc      Chief Complaint:   L breast cancer    HPI:  81 y.o. female had R breast cancer in 1996, treated with R MRM at [facility] in [place], [place].  She had adjuvant chemotherapy, no adjuvant Rad Rx.  Not clear, if she had adjuvant Tamoxifen.    Now with abnormal L mammogram.  1.5 cm mass 12 o'clock posteriorly at L breast.  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  Triple negative for ERP, PRP, H2N.    She had L mastectomy, Sentinal node bx 7 weeks ago.  LN negative.  0/1 positive.  She is triple negative.  Check PET.  7/18/22 ANDERSON    RR estimated 25- 30% at 4-5 years out with observation.  Adjuvant chemo with carboplatin, taxotere q 3 weeks x's 4-6 cycles,  udenyca support, gives RR 15% expected.  Can defer adriamycin administration.    Discussed treatment schema, potential benefit in reducing recurrence risk,  Potential side effects including hair loss, N/V/D, peripheral neuropathy, pancytopenia.  And other side effects.  Other regimens include adriamycin, but she had this 25 years ago and tolerated it with difficulty.    Peripheral veins poor, port placement per Dr. Renzo Bullard.    Tamoxifen, Arimidex not helpful here given negative ERP and PRP status.  Herceptin not helpful here given negative H@N status.  Triple negative dx.    Port placed 8/1/22, site tender, edematous.  Too fresh to access the port site.  Move D1C1 to 8/10/22.    Check BRCA 1 & 2 on her.  Triple negative, Bilateral breast cancer.    Hgb 12.7 to 10, creatinine 1.4.  GFR 43%.  S/P first cycle of chemo.  She had much toxicity.  She does not agree to go forward with #2.    Abnormal cologuard, she need colonoscopy, when she is stronger.  She has Amydesis HHA.    She does not agree to resume further chemotherapy for her cancer condition.  She  is stronger however and walking around the house with the assistance of her walker.  Appetite is improved and her weight has gone from 171-179 lb.      Will discontinue the chemotherapy from her chart.  Off chemotherapy, she did not tolerate the Rx.  We are going with observation.  RR 30% expected.    Energy is 7-8/10 now.    C/O pain at R anterior leg just below the knee.  DJD hx.  Add trial of lodine 300 mg 1 daily.  Lodine only helps for 2-3 hours.  She wants to go back to her naprosyn 750 mg daily.  Will check with [facility] pharmacy.        ROS:   GEN: normal without any fever, night sweats or weight loss  HEENT: normal with no HA's, sore throat, stiff neck, changes in vision  CV: normal with no CP, SOB, PND, FIGUEREDO or orthopnea  PULM: normal with no SOB, cough, hemoptysis, sputum or pleuritic pain  GI: normal with no abdominal pain, nausea, vomiting, constipation, diarrhea, melanotic stools, BRBPR, or hematemesis  : normal with no hematuria, dysuria  BREAST: See HPI  SKIN: normal with no rash, erythema, bruising, or swelling     Past Medical History:   Diagnosis Date    Aortic valve regurgitation     Benign paroxysmal vertigo, bilateral     Chronic kidney disease, unspecified     Coronary artery disease     Essential (primary) hypertension     Malignant neoplasm of right breast     Mitral valve regurgitation     Obesity, unspecified     Osteopenia     Positive colorectal cancer screening using Cologuard test     Rheumatoid arthritis, unspecified     Ruptured lumbar disc     Unspecified cataract      Past Surgical History:   Procedure Laterality Date    BREAST BIOPSY Left     BREAST SURGERY Left     CHOLECYSTECTOMY      EYE SURGERY      INJECTION FOR SENTINEL NODE IDENTIFICATION Left 5/26/2022    Procedure: INJECTION, FOR SENTINEL NODE IDENTIFICATION;  Surgeon: eRnzo Jim MD;  Location: [facility];  Service: General;  Laterality: Left;    INSERTION OF TUNNELED CENTRAL VENOUS CATHETER (CVC) WITH SUBCUTANEOUS  PORT N/A 8/1/2022    Procedure: GREHUMMAG-IGJO-A-CATH;  Surgeon: Renzo Jim MD;  Location: [facility] OR;  Service: General;  Laterality: N/A;    MASTECTOMY Left 5/26/2022    Procedure: MASTECTOMY;  Surgeon: Renzo Jim MD;  Location: [facility] OR;  Service: General;  Laterality: Left;    MASTECTOMY, RADICAL Right     SENTINEL LYMPH NODE BIOPSY Left 5/26/2022    Procedure: BIOPSY, LYMPH NODE, SENTINEL;  Surgeon: Renzo Jim MD;  Location: [facility] OR;  Service: General;  Laterality: Left;    TUBAL LIGATION         Review of patient's allergies indicates:  No Known Allergies  Social History     Socioeconomic History    Marital status:     Number of children: 5   Tobacco Use    Smoking status: Never    Smokeless tobacco: Never   Substance and Sexual Activity    Alcohol use: Never    Drug use: Never    Sexual activity: Not Currently     Partners: Male         Current Outpatient Medications:     cholestyramine (QUESTRAN) 4 gram packet, Take 1 packet (4 g total) by mouth 2 (two) times daily., Disp: 20 packet, Rfl: 1    dexAMETHasone (DECADRON) 4 MG Tab, Take 8mg the AM and PM before chemotherapy and the AM and PM the day after chemotherapy. Repeat every 21 days., Disp: 120 tablet, Rfl: 1    diphenoxylate-atropine 2.5-0.025 mg (LOMOTIL) 2.5-0.025 mg per tablet, Take 1 tablet by mouth 4 (four) times daily as needed for Diarrhea., Disp: 40 tablet, Rfl: 1    etodolac (LODINE) 300 MG Cap, Take 1 capsule (300 mg total) by mouth once daily., Disp: 30 capsule, Rfl: 1    furosemide (LASIX) 40 MG tablet, Take 40 mg by mouth once daily., Disp: , Rfl:     HYDROcodone-acetaminophen (NORCO) 5-325 mg per tablet, Take 1 tablet by mouth every 6 (six) hours as needed for Pain., Disp: 10 tablet, Rfl: 0    HYDROcodone-acetaminophen  mg/15 mL(15 mL) Soln, 1 tablet EVERY 6 HOURS (route: oral), Disp: , Rfl:     LIDOcaine-prilocaine (EMLA) cream, Apply topically as needed (once before port a cath is accessed)., Disp: 5 g,  "Rfl: 2    olmesartan-amLODIPin-hcthiazid 40-5-25 mg Tab, Take 1 tablet by mouth Daily., Disp: , Rfl:     ondansetron (ZOFRAN) 4 MG tablet, Take 4 mg by mouth once., Disp: , Rfl:     ondansetron (ZOFRAN) 8 MG tablet, Take 1 tablet (8 mg total) by mouth every 8 (eight) hours as needed for Nausea., Disp: 30 tablet, Rfl: 2    traMADoL (ULTRAM) 50 mg tablet, Take 50 mg by mouth once daily., Disp: , Rfl:   No current facility-administered medications for this visit.    Facility-Administered Medications Ordered in Other Visits:     electrolyte-S (ISOLYTE), , Intravenous, Continuous, Irwin Russo MD    fentaNYL 50 mcg/mL injection 25 mcg, 25 mcg, Intravenous, Q5 Min PRN, Irwin Russo MD    HYDROmorphone (PF) injection 0.2 mg, 0.2 mg, Intravenous, Q5 Min PRN, Irwin Russo MD    lactated ringers infusion, , Intravenous, Continuous, Irwin Russo MD, Last Rate: 10 mL/hr at 08/01/22 1120, Restarted at 08/01/22 1234    LIDOcaine (PF) 10 mg/ml (1%) injection 10 mg, 1 mL, Intradermal, Once, Irwin Russo MD    oxyCODONE immediate release tablet 5 mg, 5 mg, Oral, Q3H PRN, Irwin Russo MD          Objective:   Vitals:  Blood pressure (!) 162/68, pulse 85, temperature 97.3 °F (36.3 °C), resp. rate 18, height 5' 5.5" (1.664 m), weight 81.6 kg (180 lb).    Physical Examination:   GEN: no apparent distress, comfortable  HEAD: atraumatic and normocephalic  EYES: no pallor, no icterus  ENT:  no pharyngeal erythema, external ears WNL; no nasal discharge  NECK: no masses, thyroid normal, trachea midline, no LAD/LN's, supple  CV: RRR with no murmur; normal pulse; normal S1 and S2; no pedal edema  CHEST: Normal respiratory effort; CTAB; normal breath sounds; no wheeze or crackles  ABDOM: nontender and nondistended; soft;  no rebound/guarding, L/S NP  MUSC/Skeletal: ROM normal; no crepitus; joints normal  EXTREM: no clubbing, cyanosis, inflammation or swelling  SKIN: no rashes, lesions, ulcers, petechiae   : no cvat  NEURO: " "grossly intact; motor/sensory WNL;  no tremors  PSYCH: normal mood, affect and behavior  LYMPH: normal cervical, supraclavicular, axillary and LN's  BREAST: L breast NT, no D/C and no palpable mass  R chest wall NT, postop changes, no palpable mass, incision healed.  She has decreased ROM at R shoulder and arm.    CBC, CMP, TSH unremarkable    Assessment:   (1) 81 y.o. female with diagnosis of invasive ductal Ca with lobular features at 12 o'clock at L breast.  1.5 cm, Triple negative for ERP, PRP, H2N.  S/P L mastectomy Primary 1.5 cm, LN negative.    (2)Hx of R breast cancer 25 years ago.  R MRM, adjuvant chemotherapy,adjuvant tamoxifen?    (3)Discussed neoadjuvant chemotherapy with Carbo, taxotere or CTX, taxotere, or AC q 3 weeks x's 4-6 cycles.  Followed by L mastectomy and SNBx and adjuvant Rad Rx.  Or L mastectomy, SNBx and no Rad Rx necessary.    (3)Because of age, above medical problems, frail appearance; I am hesitant to go with neoadjuvant chemo initially,  She may not have the stamina for later surgery.    (4)I would go with surgery initially,and then after she recovers, will reconsider her later for adjuvant Rx.?    (5)Check PET for staging, given triple negative dx, R shoulder sx.  PET ANDERSON.    (6)S/P L mastectomy, SNBx. 5/26/22    Port placement completed, site healing.    Adjuvant Carbo, Taxotere q 3 weeks x's 4-6 cycles.  Udenyca support.  D1C1 8/10/22.    5'6"   177#    Undue toxicity.  S/P 1 cycle of chemo.  She does not agree to chemo #2.  She is doing better at this time and strengthening.      (7)DJD R knee, Naprosyn prn.    RTC 3 months.                            "

## 2022-11-15 NOTE — PLAN OF CARE
Problem: Fall Injury Risk  Goal: Absence of Fall and Fall-Related Injury  Outcome: Ongoing, Progressing  Intervention: Identify and Manage Contributors  Flowsheets (Taken 11/15/2022 1614)  Self-Care Promotion: independence encouraged  Medication Review/Management: medications reviewed  Intervention: Promote Injury-Free Environment  Flowsheets (Taken 11/15/2022 1614)  Safety Promotion/Fall Prevention: medications reviewed

## 2022-11-16 RX ORDER — NAPROXEN 500 MG/1
TABLET ORAL
Qty: 30 TABLET | Refills: 3 | Status: SHIPPED | OUTPATIENT
Start: 2022-11-16 | End: 2023-07-28

## 2022-11-16 NOTE — TELEPHONE ENCOUNTER
Call [facility] in [place].    Ask them do they have a record of her getting   Naprosyn 750 mg filled there in the past.  What was the Rx?    Do they make that strength still?

## 2022-11-16 NOTE — TELEPHONE ENCOUNTER
[facility] has no history of this patient. Karen reports she has not received Naprosyn in the past. They also no longer make 750mg Naproxen. Per Dr Jim's verbal order, orders placed for Naproxen 500 mg PO daily. Confirmed with patient her preferred pharmacy and made her aware of above. Patient verbalized understanding.

## 2023-02-15 ENCOUNTER — TELEPHONE (OUTPATIENT)
Dept: HEMATOLOGY/ONCOLOGY | Facility: CLINIC | Age: 83
End: 2023-02-15

## 2023-02-15 ENCOUNTER — OFFICE VISIT (OUTPATIENT)
Dept: HEMATOLOGY/ONCOLOGY | Facility: CLINIC | Age: 83
End: 2023-02-15
Payer: MEDICARE

## 2023-02-15 VITALS
BODY MASS INDEX: 30.81 KG/M2 | DIASTOLIC BLOOD PRESSURE: 77 MMHG | WEIGHT: 188 LBS | HEART RATE: 77 BPM | SYSTOLIC BLOOD PRESSURE: 155 MMHG | TEMPERATURE: 98 F

## 2023-02-15 DIAGNOSIS — Z17.1 MALIGNANT NEOPLASM OF CENTRAL PORTION OF LEFT BREAST IN FEMALE, ESTROGEN RECEPTOR NEGATIVE: Primary | ICD-10-CM

## 2023-02-15 DIAGNOSIS — C50.112 MALIGNANT NEOPLASM OF CENTRAL PORTION OF LEFT BREAST IN FEMALE, ESTROGEN RECEPTOR NEGATIVE: Primary | ICD-10-CM

## 2023-02-15 DIAGNOSIS — C50.919 TRIPLE NEGATIVE MALIGNANT NEOPLASM OF BREAST: ICD-10-CM

## 2023-02-15 PROCEDURE — 1101F PR PT FALLS ASSESS DOC 0-1 FALLS W/OUT INJ PAST YR: ICD-10-PCS | Mod: CPTII,S$GLB,, | Performed by: INTERNAL MEDICINE

## 2023-02-15 PROCEDURE — 99214 OFFICE O/P EST MOD 30 MIN: CPT | Mod: S$GLB,,, | Performed by: INTERNAL MEDICINE

## 2023-02-15 PROCEDURE — 99214 PR OFFICE/OUTPT VISIT, EST, LEVL IV, 30-39 MIN: ICD-10-PCS | Mod: S$GLB,,, | Performed by: INTERNAL MEDICINE

## 2023-02-15 PROCEDURE — 3078F PR MOST RECENT DIASTOLIC BLOOD PRESSURE < 80 MM HG: ICD-10-PCS | Mod: CPTII,S$GLB,, | Performed by: INTERNAL MEDICINE

## 2023-02-15 PROCEDURE — 3077F PR MOST RECENT SYSTOLIC BLOOD PRESSURE >= 140 MM HG: ICD-10-PCS | Mod: CPTII,S$GLB,, | Performed by: INTERNAL MEDICINE

## 2023-02-15 PROCEDURE — 3077F SYST BP >= 140 MM HG: CPT | Mod: CPTII,S$GLB,, | Performed by: INTERNAL MEDICINE

## 2023-02-15 PROCEDURE — 1159F PR MEDICATION LIST DOCUMENTED IN MEDICAL RECORD: ICD-10-PCS | Mod: CPTII,S$GLB,, | Performed by: INTERNAL MEDICINE

## 2023-02-15 PROCEDURE — 3288F PR FALLS RISK ASSESSMENT DOCUMENTED: ICD-10-PCS | Mod: CPTII,S$GLB,, | Performed by: INTERNAL MEDICINE

## 2023-02-15 PROCEDURE — 1125F PR PAIN SEVERITY QUANTIFIED, PAIN PRESENT: ICD-10-PCS | Mod: CPTII,S$GLB,, | Performed by: INTERNAL MEDICINE

## 2023-02-15 PROCEDURE — 1101F PT FALLS ASSESS-DOCD LE1/YR: CPT | Mod: CPTII,S$GLB,, | Performed by: INTERNAL MEDICINE

## 2023-02-15 PROCEDURE — 1125F AMNT PAIN NOTED PAIN PRSNT: CPT | Mod: CPTII,S$GLB,, | Performed by: INTERNAL MEDICINE

## 2023-02-15 PROCEDURE — 3078F DIAST BP <80 MM HG: CPT | Mod: CPTII,S$GLB,, | Performed by: INTERNAL MEDICINE

## 2023-02-15 PROCEDURE — 1159F MED LIST DOCD IN RCRD: CPT | Mod: CPTII,S$GLB,, | Performed by: INTERNAL MEDICINE

## 2023-02-15 PROCEDURE — 3288F FALL RISK ASSESSMENT DOCD: CPT | Mod: CPTII,S$GLB,, | Performed by: INTERNAL MEDICINE

## 2023-02-16 NOTE — TELEPHONE ENCOUNTER
Orders written for PF at  q 6 weeks out pt,    Will you forward to  out pt.    Lab orders placed in [facility] for 2/2023.  Lab to be drawn from port.  Send lad orders to     RTC 4 months.

## 2023-02-16 NOTE — PROGRESS NOTES
[facility] In Office Hematology Oncology Subsequent Encounter Note    2/15/22    Subjective:      Patient ID:   Rosa Aceves  82 y.o. female  1940  Elliot Forrest LeBlanc      Chief Complaint:   L breast cancer    HPI:  82 y.o. female had R breast cancer in 1996, treated with R MRM at [facility] in [place], [place].  She had adjuvant chemotherapy, no adjuvant Rad Rx.  Not clear, if she had adjuvant Tamoxifen.    Now with abnormal L mammogram.  1.5 cm mass 12 o'clock posteriorly at L breast.  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  Triple negative for ERP, PRP, H2N.    She had L mastectomy, Sentinal node bx 7 weeks ago.  LN negative.  0/1 positive.  She is triple negative.  Check PET.  7/18/22 ANDERSON    RR estimated 25- 30% at 4-5 years out with observation.  Adjuvant chemo with carboplatin, taxotere q 3 weeks x's 4-6 cycles,  udenyca support, gives RR 15% expected.  Can defer adriamycin administration.    Discussed treatment schema, potential benefit in reducing recurrence risk,  Potential side effects including hair loss, N/V/D, peripheral neuropathy, pancytopenia.  And other side effects.  Other regimens include adriamycin, but she had this 25 years ago and tolerated it with difficulty.    Peripheral veins poor, port placement per Dr. Renzo Bullard.    Tamoxifen, Arimidex not helpful here given negative ERP and PRP status.  Herceptin not helpful here given negative H@N status.  Triple negative dx.    D1C1  8/10/22.    Check BRCA 1 & 2 on her.  Triple negative, Bilateral breast cancer.    Hgb 12.7 to 10, creatinine 1.4.  GFR 43%.  S/P first cycle of chemo.  She had much toxicity.  She does not agree to go forward with #2.    Abnormal cologuard, she need colonoscopy, when she is stronger.  She has Amydesis HHA.    She does not agree to resume further chemotherapy for her cancer condition.  She is stronger however and walking around the house with the assistance of her walker.   Appetite is improved and her weight has gone from 171-179 lb.      Will discontinue the chemotherapy from her chart.  Off chemotherapy, she did not tolerate the Rx.  We are going with observation.  RR 30% expected.    Energy is 7-8/10 now.    C/O pain at R anterior leg just below the knee.  DJD hx.  Add trial of lodine 300 mg 1 daily.  Lodine only helps for 2-3 hours.  She wants to go back to her naprosyn 750 mg daily.    ROS:   GEN: normal without any fever, night sweats or weight loss  HEENT: normal with no HA's, sore throat, stiff neck, changes in vision  CV: normal with no CP, SOB, PND, FIGUEREDO or orthopnea  PULM: normal with no SOB, cough, hemoptysis, sputum or pleuritic pain  GI: normal with no abdominal pain, nausea, vomiting, constipation, diarrhea, melanotic stools, BRBPR, or hematemesis  : normal with no hematuria, dysuria  BREAST: See HPI  SKIN: normal with no rash, erythema, bruising, or swelling     Past Medical History:   Diagnosis Date    Aortic valve regurgitation     Benign paroxysmal vertigo, bilateral     Chronic kidney disease, unspecified     Coronary artery disease     Essential (primary) hypertension     Malignant neoplasm of right breast     Mitral valve regurgitation     Obesity, unspecified     Osteopenia     Positive colorectal cancer screening using Cologuard test     Rheumatoid arthritis, unspecified     Ruptured lumbar disc     Unspecified cataract      Past Surgical History:   Procedure Laterality Date    BREAST BIOPSY Left     BREAST SURGERY Left     CHOLECYSTECTOMY      EYE SURGERY      INJECTION FOR SENTINEL NODE IDENTIFICATION Left 5/26/2022    Procedure: INJECTION, FOR SENTINEL NODE IDENTIFICATION;  Surgeon: Renzo Jim MD;  Location: [facility] OR;  Service: General;  Laterality: Left;    INSERTION OF TUNNELED CENTRAL VENOUS CATHETER (CVC) WITH SUBCUTANEOUS PORT N/A 8/1/2022    Procedure: [id number]-SOII-T-CATH;  Surgeon: Renzo Jim MD;  Location: [facility] OR;  Service:  General;  Laterality: N/A;    MASTECTOMY Left 5/26/2022    Procedure: MASTECTOMY;  Surgeon: Renzo Jim MD;  Location: [facility] OR;  Service: General;  Laterality: Left;    MASTECTOMY, RADICAL Right     SENTINEL LYMPH NODE BIOPSY Left 5/26/2022    Procedure: BIOPSY, LYMPH NODE, SENTINEL;  Surgeon: Renzo Jim MD;  Location: [facility] OR;  Service: General;  Laterality: Left;    TUBAL LIGATION         Review of patient's allergies indicates:  No Known Allergies  Social History     Socioeconomic History    Marital status:     Number of children: 5   Tobacco Use    Smoking status: Never    Smokeless tobacco: Never   Substance and Sexual Activity    Alcohol use: Never    Drug use: Never    Sexual activity: Not Currently     Partners: Male         Current Outpatient Medications:     cholestyramine (QUESTRAN) 4 gram packet, Take 1 packet (4 g total) by mouth 2 (two) times daily., Disp: 20 packet, Rfl: 1    dexAMETHasone (DECADRON) 4 MG Tab, Take 8mg the AM and PM before chemotherapy and the AM and PM the day after chemotherapy. Repeat every 21 days., Disp: 120 tablet, Rfl: 1    diphenoxylate-atropine 2.5-0.025 mg (LOMOTIL) 2.5-0.025 mg per tablet, Take 1 tablet by mouth 4 (four) times daily as needed for Diarrhea., Disp: 40 tablet, Rfl: 1    etodolac (LODINE) 300 MG Cap, TAKE 1 CAPSULE(300 MG) BY MOUTH EVERY DAY, Disp: 30 capsule, Rfl: 1    furosemide (LASIX) 40 MG tablet, Take 40 mg by mouth once daily., Disp: , Rfl:     HYDROcodone-acetaminophen (NORCO) 5-325 mg per tablet, Take 1 tablet by mouth every 6 (six) hours as needed for Pain., Disp: 10 tablet, Rfl: 0    HYDROcodone-acetaminophen  mg/15 mL(15 mL) Soln, 1 tablet EVERY 6 HOURS (route: oral), Disp: , Rfl:     LIDOcaine-prilocaine (EMLA) cream, Apply topically as needed (once before port a cath is accessed)., Disp: 5 g, Rfl: 2    naproxen (NAPROSYN) 500 MG tablet, Take 1 po daily prn pain, take with food., Disp: 30 tablet, Rfl: 3     olmesartan-amLODIPin-hcthiazid 40-5-25 mg Tab, Take 1 tablet by mouth Daily., Disp: , Rfl:     ondansetron (ZOFRAN) 4 MG tablet, Take 4 mg by mouth once., Disp: , Rfl:     ondansetron (ZOFRAN) 8 MG tablet, Take 1 tablet (8 mg total) by mouth every 8 (eight) hours as needed for Nausea., Disp: 30 tablet, Rfl: 2    traMADoL (ULTRAM) 50 mg tablet, Take 50 mg by mouth once daily., Disp: , Rfl:   No current facility-administered medications for this visit.    Facility-Administered Medications Ordered in Other Visits:     electrolyte-S (ISOLYTE), , Intravenous, Continuous, Irwin Russo MD    fentaNYL 50 mcg/mL injection 25 mcg, 25 mcg, Intravenous, Q5 Min PRN, Irwin Russo MD    HYDROmorphone (PF) injection 0.2 mg, 0.2 mg, Intravenous, Q5 Min PRN, Irwin Russo MD    lactated ringers infusion, , Intravenous, Continuous, Irwin Russo MD, Last Rate: 10 mL/hr at 08/01/22 1120, Restarted at 08/01/22 1234    LIDOcaine (PF) 10 mg/ml (1%) injection 10 mg, 1 mL, Intradermal, Once, Irwin Russo MD    oxyCODONE immediate release tablet 5 mg, 5 mg, Oral, Q3H PRN, Irwin Russo MD          Objective:   Vitals:  Blood pressure (!) 155/77, pulse 77, temperature 97.8 °F (36.6 °C), weight 85.3 kg (188 lb).    Physical Examination:   GEN: no apparent distress, comfortable  HEAD: atraumatic and normocephalic  EYES: no pallor, no icterus  ENT:  no pharyngeal erythema, external ears WNL; no nasal discharge  NECK: no masses, thyroid normal, trachea midline, no LAD/LN's, supple  CV: RRR with no murmur; normal pulse; normal S1 and S2; no pedal edema  CHEST: Normal respiratory effort; CTAB; normal breath sounds; no wheeze or crackles  ABDOM: nontender and nondistended; soft;  no rebound/guarding, L/S NP  MUSC/Skeletal: ROM normal; no crepitus; joints normal  EXTREM: no clubbing, cyanosis, inflammation or swelling  SKIN: no rashes, lesions, ulcers, petechiae   : no cvat  NEURO: grossly intact; motor/sensory WNL;  no  "tremors  PSYCH: normal mood, affect and behavior  LYMPH: normal cervical, supraclavicular, axillary and LN's  BREAST: L & R chest wall NT, no palpable mass.  She has decreased ROM at R shoulder and arm.    CBC, CMP, TSH unremarkable    Assessment:   (1) 82 y.o. female with diagnosis of invasive ductal Ca with lobular features at 12 o'clock at L breast.  1.5 cm, Triple negative for ERP, PRP, H2N.  S/P L mastectomy Primary 1.5 cm, LN negative.    (2)Hx of R breast cancer 25 years ago.  R MRM, adjuvant chemotherapy,adjuvant tamoxifen?    (3)Discussed neoadjuvant chemotherapy with Carbo, taxotere or CTX, taxotere, or AC q 3 weeks x's 4-6 cycles.  Followed by L mastectomy and SNBx and adjuvant Rad Rx.  Or L mastectomy, SNBx and no Rad Rx necessary.    (3)Because of age, above medical problems, frail appearance; I am hesitant to go with neoadjuvant chemo initially,  She may not have the stamina for later surgery.    (4)I would go with surgery initially,and then after she recovers, will reconsider her later for adjuvant Rx.?    (5)Check PET for staging, given triple negative dx, R shoulder sx.  PET ANDERSON.    (6)S/P L mastectomy, SNBx. 5/26/22    Adjuvant Carbo, Taxotere q 3 weeks x's 4-6 cycles.  Udenyca support.  D1C1 8/10/22.    5'6"   177#    Undue toxicity.  S/P 1 cycle of chemo.  She does not agree to chemo #2.  She is doing better at this time and strengthening.      (7)SHITAL R knee, Scott prn.    PF at , lab to be drawn from port.    RTC 4 months.                              "

## 2023-03-14 ENCOUNTER — OFFICE VISIT (OUTPATIENT)
Dept: PODIATRY | Facility: CLINIC | Age: 83
End: 2023-03-14
Payer: MEDICARE

## 2023-03-14 VITALS
DIASTOLIC BLOOD PRESSURE: 80 MMHG | WEIGHT: 188 LBS | BODY MASS INDEX: 30.22 KG/M2 | HEIGHT: 66 IN | OXYGEN SATURATION: 95 % | RESPIRATION RATE: 18 BRPM | HEART RATE: 81 BPM | SYSTOLIC BLOOD PRESSURE: 148 MMHG

## 2023-03-14 DIAGNOSIS — M20.42 HAMMER TOES OF BOTH FEET: ICD-10-CM

## 2023-03-14 DIAGNOSIS — M20.41 HAMMER TOES OF BOTH FEET: ICD-10-CM

## 2023-03-14 DIAGNOSIS — L60.0 INGROWN NAIL: Primary | ICD-10-CM

## 2023-03-14 PROCEDURE — 3077F SYST BP >= 140 MM HG: CPT | Mod: CPTII,S$GLB,, | Performed by: PODIATRIST

## 2023-03-14 PROCEDURE — 3079F DIAST BP 80-89 MM HG: CPT | Mod: CPTII,S$GLB,, | Performed by: PODIATRIST

## 2023-03-14 PROCEDURE — 99213 PR OFFICE/OUTPT VISIT, EST, LEVL III, 20-29 MIN: ICD-10-PCS | Mod: S$GLB,,, | Performed by: PODIATRIST

## 2023-03-14 PROCEDURE — 1126F AMNT PAIN NOTED NONE PRSNT: CPT | Mod: CPTII,S$GLB,, | Performed by: PODIATRIST

## 2023-03-14 PROCEDURE — 3288F PR FALLS RISK ASSESSMENT DOCUMENTED: ICD-10-PCS | Mod: CPTII,S$GLB,, | Performed by: PODIATRIST

## 2023-03-14 PROCEDURE — 1159F PR MEDICATION LIST DOCUMENTED IN MEDICAL RECORD: ICD-10-PCS | Mod: CPTII,S$GLB,, | Performed by: PODIATRIST

## 2023-03-14 PROCEDURE — 3079F PR MOST RECENT DIASTOLIC BLOOD PRESSURE 80-89 MM HG: ICD-10-PCS | Mod: CPTII,S$GLB,, | Performed by: PODIATRIST

## 2023-03-14 PROCEDURE — 3077F PR MOST RECENT SYSTOLIC BLOOD PRESSURE >= 140 MM HG: ICD-10-PCS | Mod: CPTII,S$GLB,, | Performed by: PODIATRIST

## 2023-03-14 PROCEDURE — 1126F PR PAIN SEVERITY QUANTIFIED, NO PAIN PRESENT: ICD-10-PCS | Mod: CPTII,S$GLB,, | Performed by: PODIATRIST

## 2023-03-14 PROCEDURE — 3288F FALL RISK ASSESSMENT DOCD: CPT | Mod: CPTII,S$GLB,, | Performed by: PODIATRIST

## 2023-03-14 PROCEDURE — 99213 OFFICE O/P EST LOW 20 MIN: CPT | Mod: S$GLB,,, | Performed by: PODIATRIST

## 2023-03-14 PROCEDURE — 1159F MED LIST DOCD IN RCRD: CPT | Mod: CPTII,S$GLB,, | Performed by: PODIATRIST

## 2023-03-14 PROCEDURE — 1101F PR PT FALLS ASSESS DOC 0-1 FALLS W/OUT INJ PAST YR: ICD-10-PCS | Mod: CPTII,S$GLB,, | Performed by: PODIATRIST

## 2023-03-14 PROCEDURE — 1160F RVW MEDS BY RX/DR IN RCRD: CPT | Mod: CPTII,S$GLB,, | Performed by: PODIATRIST

## 2023-03-14 PROCEDURE — 99999 PR PBB SHADOW E&M-EST. PATIENT-LVL IV: CPT | Mod: PBBFAC,,, | Performed by: PODIATRIST

## 2023-03-14 PROCEDURE — 1160F PR REVIEW ALL MEDS BY PRESCRIBER/CLIN PHARMACIST DOCUMENTED: ICD-10-PCS | Mod: CPTII,S$GLB,, | Performed by: PODIATRIST

## 2023-03-14 PROCEDURE — 99999 PR PBB SHADOW E&M-EST. PATIENT-LVL IV: ICD-10-PCS | Mod: PBBFAC,,, | Performed by: PODIATRIST

## 2023-03-14 PROCEDURE — 1101F PT FALLS ASSESS-DOCD LE1/YR: CPT | Mod: CPTII,S$GLB,, | Performed by: PODIATRIST

## 2023-03-14 RX ORDER — ACETAMINOPHEN 500 MG
500 TABLET ORAL EVERY 6 HOURS PRN
COMMUNITY

## 2023-03-14 RX ORDER — MECLIZINE HYDROCHLORIDE 25 MG/1
TABLET ORAL
COMMUNITY
Start: 2023-02-27 | End: 2024-02-03

## 2023-03-14 NOTE — PROGRESS NOTES
Subjective:       Patient ID: Rosa Aceves is a 82 y.o. female.    Chief Complaint: Nail Care  Patient presents today with a family member she is complaining about painfully ingrown toenails on both feet she also has severe hammertoes her toes are rubbing together and causing irritation patient was treated for breast cancer in May of this year.  Patient's family member states that they had no idea that the patient's toenails were so long and so ingrown.    Past Medical History:   Diagnosis Date    Aortic valve regurgitation     Benign paroxysmal vertigo, bilateral     Chronic kidney disease, unspecified     Coronary artery disease     Essential (primary) hypertension     Malignant neoplasm of right breast     Mitral valve regurgitation     Obesity, unspecified     Osteopenia     Positive colorectal cancer screening using Cologuard test     Rheumatoid arthritis, unspecified     Ruptured lumbar disc     Unspecified cataract      Past Surgical History:   Procedure Laterality Date    BREAST BIOPSY Left     BREAST SURGERY Left     CHOLECYSTECTOMY      EYE SURGERY      INJECTION FOR SENTINEL NODE IDENTIFICATION Left 5/26/2022    Procedure: INJECTION, FOR SENTINEL NODE IDENTIFICATION;  Surgeon: Renzo Jim MD;  Location: [facility] OR;  Service: General;  Laterality: Left;    INSERTION OF TUNNELED CENTRAL VENOUS CATHETER (CVC) WITH SUBCUTANEOUS PORT N/A 8/1/2022    Procedure: [id number];  Surgeon: Renzo Jim MD;  Location: [facility] OR;  Service: General;  Laterality: N/A;    MASTECTOMY Left 5/26/2022    Procedure: MASTECTOMY;  Surgeon: Renzo Jim MD;  Location: [facility] OR;  Service: General;  Laterality: Left;    MASTECTOMY, RADICAL Right     SENTINEL LYMPH NODE BIOPSY Left 5/26/2022    Procedure: BIOPSY, LYMPH NODE, SENTINEL;  Surgeon: Renzo Jim MD;  Location: [facility] OR;  Service: General;  Laterality: Left;    TUBAL LIGATION       Family History   Problem Relation Age of Onset    Cancer  Mother     Heart failure Father     Cancer Sister     Cancer Grandchild      Social History     Socioeconomic History    Marital status:     Number of children: 5   Tobacco Use    Smoking status: Never    Smokeless tobacco: Never   Substance and Sexual Activity    Alcohol use: Never    Drug use: Never    Sexual activity: Not Currently     Partners: Male       Current Outpatient Medications   Medication Sig Dispense Refill    acetaminophen (TYLENOL) 500 MG tablet Take 500 mg by mouth every 6 (six) hours as needed.      dexAMETHasone (DECADRON) 4 MG Tab Take 8mg the AM and PM before chemotherapy and the AM and PM the day after chemotherapy. Repeat every 21 days. 120 tablet 1    diphenoxylate-atropine 2.5-0.025 mg (LOMOTIL) 2.5-0.025 mg per tablet Take 1 tablet by mouth 4 (four) times daily as needed for Diarrhea. 40 tablet 1    etodolac (LODINE) 300 MG Cap TAKE 1 CAPSULE(300 MG) BY MOUTH EVERY DAY 30 capsule 1    furosemide (LASIX) 40 MG tablet Take 40 mg by mouth once daily.      HYDROcodone-acetaminophen (NORCO) 5-325 mg per tablet Take 1 tablet by mouth every 6 (six) hours as needed for Pain. 10 tablet 0    HYDROcodone-acetaminophen  mg/15 mL(15 mL) Soln 1 tablet EVERY 6 HOURS (route: oral)      LIDOcaine-prilocaine (EMLA) cream Apply topically as needed (once before port a cath is accessed). 5 g 2    meclizine (ANTIVERT) 25 mg tablet Take by mouth.      naproxen (NAPROSYN) 500 MG tablet Take 1 po daily prn pain, take with food. 30 tablet 3    olmesartan-amLODIPin-hcthiazid 40-5-25 mg Tab Take 1 tablet by mouth Daily.      ondansetron (ZOFRAN) 4 MG tablet Take 4 mg by mouth once.      ondansetron (ZOFRAN) 8 MG tablet Take 1 tablet (8 mg total) by mouth every 8 (eight) hours as needed for Nausea. 30 tablet 2    traMADoL (ULTRAM) 50 mg tablet Take 50 mg by mouth once daily.      cholestyramine (QUESTRAN) 4 gram packet Take 1 packet (4 g total) by mouth 2 (two) times daily. (Patient not taking: Reported  "on 3/14/2023) 20 packet 1     No current facility-administered medications for this visit.     Facility-Administered Medications Ordered in Other Visits   Medication Dose Route Frequency Provider Last Rate Last Admin    electrolyte-S (ISOLYTE)   Intravenous Continuous Irwin Russo MD        fentaNYL 50 mcg/mL injection 25 mcg  25 mcg Intravenous Q5 Min PRN Irwin Russo MD        HYDROmorphone (PF) injection 0.2 mg  0.2 mg Intravenous Q5 Min PRN Irwin Russo MD        lactated ringers infusion   Intravenous Continuous Irwin Russo MD 10 mL/hr at 08/01/22 1120 Restarted at 08/01/22 1234    LIDOcaine (PF) 10 mg/ml (1%) injection 10 mg  1 mL Intradermal Once Irwin Russo MD        oxyCODONE immediate release tablet 5 mg  5 mg Oral Q3H PRN Irwin Russo MD         Review of patient's allergies indicates:  No Known Allergies    Review of Systems   Musculoskeletal:  Positive for arthralgias.   All other systems reviewed and are negative.    Objective:      Vitals:    03/14/23 1340   BP: (!) 148/80   Pulse: 81   Resp: 18   SpO2: 95%   Weight: 85.3 kg (188 lb)   Height: 5' 5.5" (1.664 m)     Physical Exam  Vitals and nursing note reviewed. Exam conducted with a chaperone present.   Constitutional:       Appearance: Normal appearance.   Cardiovascular:      Pulses:           Dorsalis pedis pulses are 1+ on the right side and 1+ on the left side.        Posterior tibial pulses are 0 on the right side and 0 on the left side.   Pulmonary:      Effort: Pulmonary effort is normal.   Musculoskeletal:         General: Tenderness and deformity present.   Feet:      Right foot:      Protective Sensation: 2 sites tested.  2 sites sensed.      Skin integrity: Erythema present.      Toenail Condition: Right toenails are abnormally thick, long and ingrown. Fungal disease present.     Left foot:      Protective Sensation: 2 sites tested.  2 sites sensed.      Skin integrity: Erythema present.      Toenail Condition: Left " toenails are abnormally thick, long and ingrown. Fungal disease present.  Skin:     General: Skin is warm.      Capillary Refill: Capillary refill takes more than 3 seconds.      Findings: Erythema present.   Neurological:      General: No focal deficit present.      Mental Status: She is alert.   Psychiatric:         Mood and Affect: Mood normal.         Behavior: Behavior normal.                                        Assessment:       1. Ingrown nail    2. Hammer toes of both feet          Plan:       Patient presents today with a family member she is complaining about painfully ingrown toenails on both feet she also has severe hammertoes her toes are rubbing together and causing irritation patient was treated for breast cancer in May of this year.  Patient's family member states that they had no idea that the patient's toenails were so long and so ingrown.  A comprehensive new patient evaluation was performed today patient does have severely contracted digits bilateral the patient also has very prominent metatarsal heads and excessively dry skin I explained to the patient the patient's family member the patient's skin needs to be kept well hydrated to prevent areas of breakdown and potential ulceration as well as callus formation.  Patient had severely elongated ingrowing toenails on multiple digits bilateral while the patient did not need a nail avulsion I was able to aggressively trim and remove the ingrowing toenails which the patient tolerated well patient noted to me to be immediate relief upon removing the ingrown toenail this was especially noticeable on the bilateral hallux.  Patient does have irritation 5th digit left and needs to make sure she is wearing shoes that accommodate for the digital contracture this irritation could certainly lead to breakdown ulceration which can lead to complication with the patient's peripheral vascular disease.  Recommended follow-up will be as needed if the patient is  not getting complete relief of the ingrowing toenails in the next 4-5 days to contact us for follow-up further evaluation treatment but I do recommend keeping the patient's skin well moisturized well hydrated on a daily basis.  Total time including discussion evaluation treatment discussion of treatment options treatment plan removal of ingrown toenails as well as comprehensive new patient exam and documentation equaled 20 minutes.This note was created using [employer] voice recognition software that occasionally misinterpreted phrases or words.

## 2023-04-13 ENCOUNTER — TELEPHONE (OUTPATIENT)
Dept: HEMATOLOGY/ONCOLOGY | Facility: CLINIC | Age: 83
End: 2023-04-13

## 2023-04-13 NOTE — TELEPHONE ENCOUNTER
CHRISTINE Alcantar, from [facility] called to notify that at the patient's recent port flush, patient's port will flush, but does not draw back blood. Notified JAMES Sauceda of above, no new orders at this time.

## 2023-06-01 DIAGNOSIS — C50.112 MALIGNANT NEOPLASM OF CENTRAL PORTION OF LEFT BREAST IN FEMALE, ESTROGEN RECEPTOR NEGATIVE: ICD-10-CM

## 2023-06-01 DIAGNOSIS — Z17.1 MALIGNANT NEOPLASM OF CENTRAL PORTION OF LEFT BREAST IN FEMALE, ESTROGEN RECEPTOR NEGATIVE: ICD-10-CM

## 2023-06-01 RX ORDER — ETODOLAC 300 MG/1
CAPSULE ORAL
Qty: 30 CAPSULE | Refills: 1 | Status: SHIPPED | OUTPATIENT
Start: 2023-06-01 | End: 2023-09-18

## 2023-06-15 ENCOUNTER — OFFICE VISIT (OUTPATIENT)
Dept: HEMATOLOGY/ONCOLOGY | Facility: CLINIC | Age: 83
End: 2023-06-15
Payer: MEDICARE

## 2023-06-15 VITALS
WEIGHT: 194 LBS | TEMPERATURE: 98 F | HEIGHT: 66 IN | SYSTOLIC BLOOD PRESSURE: 146 MMHG | HEART RATE: 77 BPM | RESPIRATION RATE: 18 BRPM | DIASTOLIC BLOOD PRESSURE: 74 MMHG | BODY MASS INDEX: 31.18 KG/M2

## 2023-06-15 DIAGNOSIS — C50.919 TRIPLE NEGATIVE MALIGNANT NEOPLASM OF BREAST: ICD-10-CM

## 2023-06-15 DIAGNOSIS — Z17.1 MALIGNANT NEOPLASM OF CENTRAL PORTION OF LEFT BREAST IN FEMALE, ESTROGEN RECEPTOR NEGATIVE: Primary | ICD-10-CM

## 2023-06-15 DIAGNOSIS — C50.112 MALIGNANT NEOPLASM OF CENTRAL PORTION OF LEFT BREAST IN FEMALE, ESTROGEN RECEPTOR NEGATIVE: Primary | ICD-10-CM

## 2023-06-15 PROCEDURE — 1101F PT FALLS ASSESS-DOCD LE1/YR: CPT | Mod: CPTII,S$GLB,, | Performed by: INTERNAL MEDICINE

## 2023-06-15 PROCEDURE — 99214 PR OFFICE/OUTPT VISIT, EST, LEVL IV, 30-39 MIN: ICD-10-PCS | Mod: S$GLB,,, | Performed by: INTERNAL MEDICINE

## 2023-06-15 PROCEDURE — 1101F PR PT FALLS ASSESS DOC 0-1 FALLS W/OUT INJ PAST YR: ICD-10-PCS | Mod: CPTII,S$GLB,, | Performed by: INTERNAL MEDICINE

## 2023-06-15 PROCEDURE — 1159F PR MEDICATION LIST DOCUMENTED IN MEDICAL RECORD: ICD-10-PCS | Mod: CPTII,S$GLB,, | Performed by: INTERNAL MEDICINE

## 2023-06-15 PROCEDURE — 3078F PR MOST RECENT DIASTOLIC BLOOD PRESSURE < 80 MM HG: ICD-10-PCS | Mod: CPTII,S$GLB,, | Performed by: INTERNAL MEDICINE

## 2023-06-15 PROCEDURE — 3288F PR FALLS RISK ASSESSMENT DOCUMENTED: ICD-10-PCS | Mod: CPTII,S$GLB,, | Performed by: INTERNAL MEDICINE

## 2023-06-15 PROCEDURE — 3288F FALL RISK ASSESSMENT DOCD: CPT | Mod: CPTII,S$GLB,, | Performed by: INTERNAL MEDICINE

## 2023-06-15 PROCEDURE — 1159F MED LIST DOCD IN RCRD: CPT | Mod: CPTII,S$GLB,, | Performed by: INTERNAL MEDICINE

## 2023-06-15 PROCEDURE — 1126F PR PAIN SEVERITY QUANTIFIED, NO PAIN PRESENT: ICD-10-PCS | Mod: CPTII,S$GLB,, | Performed by: INTERNAL MEDICINE

## 2023-06-15 PROCEDURE — 3077F PR MOST RECENT SYSTOLIC BLOOD PRESSURE >= 140 MM HG: ICD-10-PCS | Mod: CPTII,S$GLB,, | Performed by: INTERNAL MEDICINE

## 2023-06-15 PROCEDURE — 99214 OFFICE O/P EST MOD 30 MIN: CPT | Mod: S$GLB,,, | Performed by: INTERNAL MEDICINE

## 2023-06-15 PROCEDURE — 1126F AMNT PAIN NOTED NONE PRSNT: CPT | Mod: CPTII,S$GLB,, | Performed by: INTERNAL MEDICINE

## 2023-06-15 PROCEDURE — 3078F DIAST BP <80 MM HG: CPT | Mod: CPTII,S$GLB,, | Performed by: INTERNAL MEDICINE

## 2023-06-15 PROCEDURE — 3077F SYST BP >= 140 MM HG: CPT | Mod: CPTII,S$GLB,, | Performed by: INTERNAL MEDICINE

## 2023-06-17 NOTE — PROGRESS NOTES
[facility] In Office Hematology Oncology Subsequent Encounter Note    6/15/23    Subjective:      Patient ID:   Rosa Aceves  82 y.o. female  1940  [place]      Chief Complaint:   L breast cancer    HPI:  82 y.o. female had R breast cancer in 1996, treated with R MRM at [facility] in [place], [place].  She had adjuvant chemotherapy, no adjuvant Rad Rx.  Not clear, if she had adjuvant Tamoxifen.    Now with abnormal L mammogram.  1.5 cm mass 12 o'clock posteriorly at L breast.  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  Triple negative for ERP, PRP, H2N.    She had L mastectomy, Sentinal node bx 7 weeks ago.  LN negative.  0/1 positive.  She is triple negative.  Check PET.  7/18/22 [facility]    RR estimated 25- 30% at 4-5 years out with observation.  Adjuvant chemo with carboplatin, taxotere q 3 weeks x's 4-6 cycles,  udenyca support, gives RR 15% expected.  Can defer adriamycin administration.    Discussed treatment schema, potential benefit in reducing recurrence risk,  Potential side effects including hair loss, N/V/D, peripheral neuropathy, pancytopenia.  And other side effects.  Other regimens include adriamycin, but she had this 25 years ago and tolerated it with difficulty.    Peripheral veins poor, port placement per Dr. Renzo Bullard.    Tamoxifen, Arimidex not helpful here given negative ERP and PRP status.  Herceptin not helpful here given negative H@N status.  Triple negative dx.    D1C1  8/10/22.    Check BRCA 1 & 2 on her.  Triple negative, Bilateral breast cancer.    Hgb 12.7 to 10, creatinine 1.4.  GFR 43%.  S/P first cycle of chemo.  She had much toxicity.  She does not agree to go forward with #2.    Abnormal cologuard, she need colonoscopy, when she is stronger.  She has Amydesis HHA.    She does not agree to resume further chemotherapy for her cancer condition.  She is stronger however and walking around the house with the assistance of her walker.   Appetite is improved and her weight has gone from 171-179 lb.      Will discontinue the chemotherapy from her chart.  Off chemotherapy, she did not tolerate the Rx.  We are going with observation.  RR 30% expected.    Energy is 7-8/10 now.    C/O pain at R anterior leg just below the knee.  DJD hx.  Add trial of lodine 300 mg 1 daily.  Lodine only helps for 2-3 hours.  She wants to go back to her naprosyn 750 mg daily.    Dx 5/26/22, now 1 year out since Dx.  S/P bilateral mastectomy.  PF q 6 weeks.  Daughter takes celexa 10 mg prn HF sx.    ROS:   GEN: normal without any fever, night sweats or weight loss  HEENT: normal with no HA's, sore throat, stiff neck, changes in vision  CV: normal with no CP, SOB, PND, FIGUEREDO or orthopnea  PULM: normal with no SOB, cough, hemoptysis, sputum or pleuritic pain  GI: normal with no abdominal pain, nausea, vomiting, constipation, diarrhea, melanotic stools, BRBPR, or hematemesis  : normal with no hematuria, dysuria  BREAST: See HPI  SKIN: normal with no rash, erythema, bruising, or swelling     Past Medical History:   Diagnosis Date    Aortic valve regurgitation     Benign paroxysmal vertigo, bilateral     Chronic kidney disease, unspecified     Coronary artery disease     Essential (primary) hypertension     Malignant neoplasm of right breast     Mitral valve regurgitation     Obesity, unspecified     Osteopenia     Positive colorectal cancer screening using Cologuard test     Rheumatoid arthritis, unspecified     Ruptured lumbar disc     Unspecified cataract      Past Surgical History:   Procedure Laterality Date    BREAST BIOPSY Left     BREAST SURGERY Left     CHOLECYSTECTOMY      EYE SURGERY      INJECTION FOR SENTINEL NODE IDENTIFICATION Left 5/26/2022    Procedure: INJECTION, FOR SENTINEL NODE IDENTIFICATION;  Surgeon: Renzo Jim MD;  Location: [facility];  Service: General;  Laterality: Left;    INSERTION OF TUNNELED CENTRAL VENOUS CATHETER (CVC) WITH SUBCUTANEOUS  PORT N/A 8/1/2022    Procedure: [id number];  Surgeon: Renzo Jim MD;  Location: [facility] OR;  Service: General;  Laterality: N/A;    MASTECTOMY Left 5/26/2022    Procedure: MASTECTOMY;  Surgeon: Renzo Jim MD;  Location: [facility] OR;  Service: General;  Laterality: Left;    MASTECTOMY, RADICAL Right     SENTINEL LYMPH NODE BIOPSY Left 5/26/2022    Procedure: BIOPSY, LYMPH NODE, SENTINEL;  Surgeon: Renzo Jim MD;  Location: [facility] OR;  Service: General;  Laterality: Left;    TUBAL LIGATION         Review of patient's allergies indicates:  No Known Allergies  Social History     Socioeconomic History    Marital status:     Number of children: 5   Tobacco Use    Smoking status: Never    Smokeless tobacco: Never   Substance and Sexual Activity    Alcohol use: Never    Drug use: Never    Sexual activity: Not Currently     Partners: Male         Current Outpatient Medications:     acetaminophen (TYLENOL) 500 MG tablet, Take 500 mg by mouth every 6 (six) hours as needed., Disp: , Rfl:     dexAMETHasone (DECADRON) 4 MG Tab, Take 8mg the AM and PM before chemotherapy and the AM and PM the day after chemotherapy. Repeat every 21 days., Disp: 120 tablet, Rfl: 1    diphenoxylate-atropine 2.5-0.025 mg (LOMOTIL) 2.5-0.025 mg per tablet, Take 1 tablet by mouth 4 (four) times daily as needed for Diarrhea., Disp: 40 tablet, Rfl: 1    etodolac (LODINE) 300 MG Cap, TAKE 1 CAPSULE(300 MG) BY MOUTH EVERY DAY, Disp: 30 capsule, Rfl: 1    furosemide (LASIX) 40 MG tablet, Take 40 mg by mouth once daily., Disp: , Rfl:     HYDROcodone-acetaminophen (NORCO) 5-325 mg per tablet, Take 1 tablet by mouth every 6 (six) hours as needed for Pain., Disp: 10 tablet, Rfl: 0    HYDROcodone-acetaminophen  mg/15 mL(15 mL) Soln, 1 tablet EVERY 6 HOURS (route: oral), Disp: , Rfl:     LIDOcaine-prilocaine (EMLA) cream, Apply topically as needed (once before port a cath is accessed)., Disp: 5 g, Rfl: 2    meclizine  "(ANTIVERT) 25 mg tablet, Take by mouth., Disp: , Rfl:     naproxen (NAPROSYN) 500 MG tablet, Take 1 po daily prn pain, take with food., Disp: 30 tablet, Rfl: 3    olmesartan-amLODIPin-hcthiazid 40-5-25 mg Tab, Take 1 tablet by mouth Daily., Disp: , Rfl:     ondansetron (ZOFRAN) 4 MG tablet, Take 4 mg by mouth once., Disp: , Rfl:     ondansetron (ZOFRAN) 8 MG tablet, Take 1 tablet (8 mg total) by mouth every 8 (eight) hours as needed for Nausea., Disp: 30 tablet, Rfl: 2    traMADoL (ULTRAM) 50 mg tablet, Take 50 mg by mouth once daily., Disp: , Rfl:     cholestyramine (QUESTRAN) 4 gram packet, Take 1 packet (4 g total) by mouth 2 (two) times daily. (Patient not taking: Reported on 3/14/2023), Disp: 20 packet, Rfl: 1  No current facility-administered medications for this visit.    Facility-Administered Medications Ordered in Other Visits:     electrolyte-S (ISOLYTE), , Intravenous, Continuous, Irwin Russo MD    fentaNYL 50 mcg/mL injection 25 mcg, 25 mcg, Intravenous, Q5 Min PRN, Irwin Russo MD    HYDROmorphone (PF) injection 0.2 mg, 0.2 mg, Intravenous, Q5 Min PRN, Irwin Russo MD    lactated ringers infusion, , Intravenous, Continuous, Irwin Russo MD, Last Rate: 10 mL/hr at 08/01/22 1120, Restarted at 08/01/22 1234    LIDOcaine (PF) 10 mg/ml (1%) injection 10 mg, 1 mL, Intradermal, Once, Irwin Russo MD    oxyCODONE immediate release tablet 5 mg, 5 mg, Oral, Q3H PRN, Irwin Russo MD          Objective:   Vitals:  Blood pressure (!) 146/74, pulse 77, temperature 97.6 °F (36.4 °C), resp. rate 18, height 5' 5.5" (1.664 m), weight 88 kg (194 lb).    Physical Examination:   GEN: no apparent distress, comfortable  HEAD: atraumatic and normocephalic  EYES: no pallor, no icterus  ENT:  no pharyngeal erythema, external ears WNL; no nasal discharge  NECK: no masses, thyroid normal, trachea midline, no LAD/LN's, supple  CV: RRR with no murmur; normal pulse; normal S1 and S2; no pedal edema  CHEST: Normal " "respiratory effort; CTAB; normal breath sounds; no wheeze or crackles  ABDOM: nontender and nondistended; soft;  no rebound/guarding, L/S NP  MUSC/Skeletal: ROM normal; no crepitus; joints normal  EXTREM: no clubbing, cyanosis, inflammation or swelling  SKIN: no rashes, lesions, ulcers, petechiae   : no cvat  NEURO: grossly intact; motor/sensory WNL;  no tremors  PSYCH: normal mood, affect and behavior  LYMPH: normal cervical, supraclavicular, axillary and LN's  BREAST: L & R chest wall NT, no palpable mass.  She has decreased ROM at R shoulder and arm.    CBC, CMP, TSH unremarkable    Assessment:   (1) 82 y.o. female with diagnosis of invasive ductal Ca with lobular features at 12 o'clock at L breast.  1.5 cm, Triple negative for ERP, PRP, H2N.  S/P L mastectomy Primary 1.5 cm, LN negative.    (2)Hx of R breast cancer 25 years ago.  R MRM, adjuvant chemotherapy,adjuvant tamoxifen?    (3)Discussed neoadjuvant chemotherapy with Carbo, taxotere or CTX, taxotere, or AC q 3 weeks x's 4-6 cycles.  Followed by L mastectomy and SNBx and adjuvant Rad Rx.  Or L mastectomy, SNBx and no Rad Rx necessary.    (3)Because of age, above medical problems, frail appearance; I am hesitant to go with neoadjuvant chemo initially,  She may not have the stamina for later surgery.    (4)I would go with surgery initially,and then after she recovers, will reconsider her later for adjuvant Rx.?    (5)Check PET for staging, given triple negative dx, R shoulder sx.  PET [facility].    (6)S/P L mastectomy, SNBx. 5/26/22    Adjuvant Carbo, Taxotere q 3 weeks x's 4-6 cycles.  Udenyca support.  D1C1 8/10/22.    5'6"   177#    Undue toxicity.  S/P 1 cycle of chemo.  She does not agree to chemo #2.  She is doing better at this time and strengthening.      (7)DJD R knee, Naprosyn prn.    PF at , lab to be drawn from port.    RTC 4 months.                                "

## 2023-06-19 ENCOUNTER — TELEPHONE (OUTPATIENT)
Dept: HEMATOLOGY/ONCOLOGY | Facility: CLINIC | Age: 83
End: 2023-06-19

## 2023-06-22 ENCOUNTER — OFFICE VISIT (OUTPATIENT)
Dept: PODIATRY | Facility: CLINIC | Age: 83
End: 2023-06-22
Payer: MEDICARE

## 2023-06-22 VITALS
WEIGHT: 194 LBS | HEART RATE: 71 BPM | BODY MASS INDEX: 32.32 KG/M2 | DIASTOLIC BLOOD PRESSURE: 71 MMHG | SYSTOLIC BLOOD PRESSURE: 151 MMHG | HEIGHT: 65 IN

## 2023-06-22 DIAGNOSIS — M20.41 HAMMER TOES OF BOTH FEET: ICD-10-CM

## 2023-06-22 DIAGNOSIS — M20.42 HAMMER TOES OF BOTH FEET: ICD-10-CM

## 2023-06-22 DIAGNOSIS — L60.0 INGROWN NAIL: Primary | ICD-10-CM

## 2023-06-22 PROCEDURE — 3288F FALL RISK ASSESSMENT DOCD: CPT | Mod: CPTII,S$GLB,, | Performed by: PODIATRIST

## 2023-06-22 PROCEDURE — 1160F PR REVIEW ALL MEDS BY PRESCRIBER/CLIN PHARMACIST DOCUMENTED: ICD-10-PCS | Mod: CPTII,S$GLB,, | Performed by: PODIATRIST

## 2023-06-22 PROCEDURE — 3078F DIAST BP <80 MM HG: CPT | Mod: CPTII,S$GLB,, | Performed by: PODIATRIST

## 2023-06-22 PROCEDURE — 1101F PR PT FALLS ASSESS DOC 0-1 FALLS W/OUT INJ PAST YR: ICD-10-PCS | Mod: CPTII,S$GLB,, | Performed by: PODIATRIST

## 2023-06-22 PROCEDURE — 99999 PR PBB SHADOW E&M-EST. PATIENT-LVL III: CPT | Mod: PBBFAC,,, | Performed by: PODIATRIST

## 2023-06-22 PROCEDURE — 3077F SYST BP >= 140 MM HG: CPT | Mod: CPTII,S$GLB,, | Performed by: PODIATRIST

## 2023-06-22 PROCEDURE — 99213 PR OFFICE/OUTPT VISIT, EST, LEVL III, 20-29 MIN: ICD-10-PCS | Mod: S$GLB,,, | Performed by: PODIATRIST

## 2023-06-22 PROCEDURE — 3288F PR FALLS RISK ASSESSMENT DOCUMENTED: ICD-10-PCS | Mod: CPTII,S$GLB,, | Performed by: PODIATRIST

## 2023-06-22 PROCEDURE — 1126F AMNT PAIN NOTED NONE PRSNT: CPT | Mod: CPTII,S$GLB,, | Performed by: PODIATRIST

## 2023-06-22 PROCEDURE — 99213 OFFICE O/P EST LOW 20 MIN: CPT | Mod: S$GLB,,, | Performed by: PODIATRIST

## 2023-06-22 PROCEDURE — 1159F MED LIST DOCD IN RCRD: CPT | Mod: CPTII,S$GLB,, | Performed by: PODIATRIST

## 2023-06-22 PROCEDURE — 1126F PR PAIN SEVERITY QUANTIFIED, NO PAIN PRESENT: ICD-10-PCS | Mod: CPTII,S$GLB,, | Performed by: PODIATRIST

## 2023-06-22 PROCEDURE — 99999 PR PBB SHADOW E&M-EST. PATIENT-LVL III: ICD-10-PCS | Mod: PBBFAC,,, | Performed by: PODIATRIST

## 2023-06-22 PROCEDURE — 1160F RVW MEDS BY RX/DR IN RCRD: CPT | Mod: CPTII,S$GLB,, | Performed by: PODIATRIST

## 2023-06-22 PROCEDURE — 1159F PR MEDICATION LIST DOCUMENTED IN MEDICAL RECORD: ICD-10-PCS | Mod: CPTII,S$GLB,, | Performed by: PODIATRIST

## 2023-06-22 PROCEDURE — 3077F PR MOST RECENT SYSTOLIC BLOOD PRESSURE >= 140 MM HG: ICD-10-PCS | Mod: CPTII,S$GLB,, | Performed by: PODIATRIST

## 2023-06-22 PROCEDURE — 1101F PT FALLS ASSESS-DOCD LE1/YR: CPT | Mod: CPTII,S$GLB,, | Performed by: PODIATRIST

## 2023-06-22 PROCEDURE — 3078F PR MOST RECENT DIASTOLIC BLOOD PRESSURE < 80 MM HG: ICD-10-PCS | Mod: CPTII,S$GLB,, | Performed by: PODIATRIST

## 2023-06-25 NOTE — PROGRESS NOTES
Subjective:       Patient ID: Rosa Aceves is a 82 y.o. female.    Chief Complaint: Ingrown Toenail  Patient presents today she is complaining about painfully ingrown toenails on both feet she also has severe hammertoes her toes are rubbing together and causing irritation patient was treated for breast cancer in May of this year.  Patient's family member states that they had no idea that the patient's toenails were so long and so ingrown.    Past Medical History:   Diagnosis Date    Aortic valve regurgitation     Benign paroxysmal vertigo, bilateral     Chronic kidney disease, unspecified     Coronary artery disease     Essential (primary) hypertension     Malignant neoplasm of right breast     Mitral valve regurgitation     Obesity, unspecified     Osteopenia     Positive colorectal cancer screening using Cologuard test     Rheumatoid arthritis, unspecified     Ruptured lumbar disc     Unspecified cataract      Past Surgical History:   Procedure Laterality Date    BREAST BIOPSY Left     BREAST SURGERY Left     CHOLECYSTECTOMY      EYE SURGERY      INJECTION FOR SENTINEL NODE IDENTIFICATION Left 5/26/2022    Procedure: INJECTION, FOR SENTINEL NODE IDENTIFICATION;  Surgeon: Renzo Jim MD;  Location: [facility] OR;  Service: General;  Laterality: Left;    INSERTION OF TUNNELED CENTRAL VENOUS CATHETER (CVC) WITH SUBCUTANEOUS PORT N/A 8/1/2022    Procedure: [id number];  Surgeon: Renzo Jim MD;  Location: [facility] OR;  Service: General;  Laterality: N/A;    MASTECTOMY Left 5/26/2022    Procedure: MASTECTOMY;  Surgeon: Renzo Jim MD;  Location: [facility] OR;  Service: General;  Laterality: Left;    MASTECTOMY, RADICAL Right     SENTINEL LYMPH NODE BIOPSY Left 5/26/2022    Procedure: BIOPSY, LYMPH NODE, SENTINEL;  Surgeon: Renzo Jim MD;  Location: [facility] OR;  Service: General;  Laterality: Left;    TUBAL LIGATION       Family History   Problem Relation Age of Onset    Cancer Mother      Heart failure Father     Cancer Sister     Cancer Grandchild      Social History     Socioeconomic History    Marital status:     Number of children: 5   Tobacco Use    Smoking status: Never    Smokeless tobacco: Never   Substance and Sexual Activity    Alcohol use: Never    Drug use: Never    Sexual activity: Not Currently     Partners: Male       Current Outpatient Medications   Medication Sig Dispense Refill    acetaminophen (TYLENOL) 500 MG tablet Take 500 mg by mouth every 6 (six) hours as needed.      dexAMETHasone (DECADRON) 4 MG Tab Take 8mg the AM and PM before chemotherapy and the AM and PM the day after chemotherapy. Repeat every 21 days. 120 tablet 1    diphenoxylate-atropine 2.5-0.025 mg (LOMOTIL) 2.5-0.025 mg per tablet Take 1 tablet by mouth 4 (four) times daily as needed for Diarrhea. 40 tablet 1    etodolac (LODINE) 300 MG Cap TAKE 1 CAPSULE(300 MG) BY MOUTH EVERY DAY 30 capsule 1    furosemide (LASIX) 40 MG tablet Take 40 mg by mouth once daily.      HYDROcodone-acetaminophen (NORCO) 5-325 mg per tablet Take 1 tablet by mouth every 6 (six) hours as needed for Pain. 10 tablet 0    HYDROcodone-acetaminophen  mg/15 mL(15 mL) Soln 1 tablet EVERY 6 HOURS (route: oral)      LIDOcaine-prilocaine (EMLA) cream Apply topically as needed (once before port a cath is accessed). 5 g 2    meclizine (ANTIVERT) 25 mg tablet Take by mouth.      naproxen (NAPROSYN) 500 MG tablet Take 1 po daily prn pain, take with food. 30 tablet 3    olmesartan-amLODIPin-hcthiazid 40-5-25 mg Tab Take 1 tablet by mouth Daily.      ondansetron (ZOFRAN) 4 MG tablet Take 4 mg by mouth once.      ondansetron (ZOFRAN) 8 MG tablet Take 1 tablet (8 mg total) by mouth every 8 (eight) hours as needed for Nausea. 30 tablet 2    traMADoL (ULTRAM) 50 mg tablet Take 50 mg by mouth once daily.      cholestyramine (QUESTRAN) 4 gram packet Take 1 packet (4 g total) by mouth 2 (two) times daily. (Patient not taking: Reported on  "3/14/2023) 20 packet 1     No current facility-administered medications for this visit.     Facility-Administered Medications Ordered in Other Visits   Medication Dose Route Frequency Provider Last Rate Last Admin    electrolyte-S (ISOLYTE)   Intravenous Continuous Irwin Russo MD        fentaNYL 50 mcg/mL injection 25 mcg  25 mcg Intravenous Q5 Min PRN Irwin Russo MD        HYDROmorphone (PF) injection 0.2 mg  0.2 mg Intravenous Q5 Min PRN Irwin Russo MD        lactated ringers infusion   Intravenous Continuous Irwin Russo MD 10 mL/hr at 08/01/22 1120 Restarted at 08/01/22 1234    LIDOcaine (PF) 10 mg/ml (1%) injection 10 mg  1 mL Intradermal Once Irwin Russo MD        oxyCODONE immediate release tablet 5 mg  5 mg Oral Q3H PRN Irwin Russo MD         Review of patient's allergies indicates:  No Known Allergies    Review of Systems   Musculoskeletal:  Positive for arthralgias.   All other systems reviewed and are negative.    Objective:      Vitals:    06/22/23 1004   BP: (!) 151/71   BP Location: Left arm   Patient Position: Sitting   Pulse: 71   Weight: 88 kg (194 lb)   Height: 5' 5" (1.651 m)     Physical Exam  Vitals and nursing note reviewed. Exam conducted with a chaperone present.   Constitutional:       Appearance: Normal appearance.   Cardiovascular:      Pulses:           Dorsalis pedis pulses are 1+ on the right side and 1+ on the left side.        Posterior tibial pulses are 0 on the right side and 0 on the left side.   Pulmonary:      Effort: Pulmonary effort is normal.   Musculoskeletal:         General: Tenderness and deformity present.   Feet:      Right foot:      Protective Sensation: 2 sites tested.  2 sites sensed.      Skin integrity: Erythema present.      Toenail Condition: Right toenails are abnormally thick, long and ingrown. Fungal disease present.     Left foot:      Protective Sensation: 2 sites tested.  2 sites sensed.      Skin integrity: Erythema present.      " Toenail Condition: Left toenails are abnormally thick, long and ingrown. Fungal disease present.  Skin:     General: Skin is warm.      Capillary Refill: Capillary refill takes more than 3 seconds.      Findings: Erythema present.   Neurological:      General: No focal deficit present.      Mental Status: She is alert.   Psychiatric:         Mood and Affect: Mood normal.         Behavior: Behavior normal.                                                      Assessment:       1. Ingrown nail    2. Hammer toes of both feet          Plan:       Patient presents today complaining about painfully ingrown toenails on both feet she also has severe hammertoes her toes are rubbing together and causing irritation patient was treated for breast cancer in May of this year.  Patient's family member states that they had no idea that the patient's toenails were so long and so ingrown.  A comprehensive new patient evaluation was performed today patient does have severely contracted digits bilateral the patient also has very prominent metatarsal heads and excessively dry skin I explained to the patient the patient's family member the patient's skin needs to be kept well hydrated to prevent areas of breakdown and potential ulceration as well as callus formation.  Patient had severely elongated ingrowing toenails on multiple digits bilateral while the patient did not need a nail avulsion I was able to aggressively trim and remove the ingrowing toenails which the patient tolerated well patient noted to me to be immediate relief upon removing the ingrown toenail this was especially noticeable on the bilateral hallux.  Patient does have irritation 5th digit left and needs to make sure she is wearing shoes that accommodate for the digital contracture this irritation could certainly lead to breakdown ulceration which can lead to complication with the patient's peripheral vascular disease.  Recommended follow-up will be as needed if the  patient is not getting complete relief of the ingrowing toenails in the next 4-5 days to contact us for follow-up further evaluation treatment but I do recommend keeping the patient's skin well moisturized well hydrated on a daily basis.  Total time including discussion evaluation treatment discussion of treatment options treatment plan removal of ingrown toenails as well as comprehensive new patient exam and documentation equaled 20 minutes.This note was created using [employer] voice recognition software that occasionally misinterpreted phrases or words.

## 2023-07-27 DIAGNOSIS — M25.50 ARTHRALGIA, UNSPECIFIED JOINT: ICD-10-CM

## 2023-07-28 RX ORDER — NAPROXEN 500 MG/1
TABLET ORAL
Qty: 30 TABLET | Refills: 3 | Status: SHIPPED | OUTPATIENT
Start: 2023-07-28 | End: 2024-02-03

## 2023-09-13 ENCOUNTER — TELEPHONE (OUTPATIENT)
Dept: HEMATOLOGY/ONCOLOGY | Facility: CLINIC | Age: 83
End: 2023-09-13

## 2023-09-17 DIAGNOSIS — C50.112 MALIGNANT NEOPLASM OF CENTRAL PORTION OF LEFT BREAST IN FEMALE, ESTROGEN RECEPTOR NEGATIVE: ICD-10-CM

## 2023-09-17 DIAGNOSIS — Z17.1 MALIGNANT NEOPLASM OF CENTRAL PORTION OF LEFT BREAST IN FEMALE, ESTROGEN RECEPTOR NEGATIVE: ICD-10-CM

## 2023-09-18 RX ORDER — ETODOLAC 300 MG/1
CAPSULE ORAL
Qty: 30 CAPSULE | Refills: 1 | Status: SHIPPED | OUTPATIENT
Start: 2023-09-18 | End: 2024-02-01

## 2023-09-21 ENCOUNTER — OFFICE VISIT (OUTPATIENT)
Dept: HEMATOLOGY/ONCOLOGY | Facility: CLINIC | Age: 83
End: 2023-09-21
Payer: MEDICARE

## 2023-09-21 VITALS
TEMPERATURE: 98 F | HEART RATE: 76 BPM | WEIGHT: 196.19 LBS | HEIGHT: 65 IN | DIASTOLIC BLOOD PRESSURE: 76 MMHG | SYSTOLIC BLOOD PRESSURE: 186 MMHG | BODY MASS INDEX: 32.69 KG/M2 | RESPIRATION RATE: 16 BRPM

## 2023-09-21 DIAGNOSIS — Z17.1 MALIGNANT NEOPLASM OF CENTRAL PORTION OF LEFT BREAST IN FEMALE, ESTROGEN RECEPTOR NEGATIVE: Primary | ICD-10-CM

## 2023-09-21 DIAGNOSIS — C50.112 MALIGNANT NEOPLASM OF CENTRAL PORTION OF LEFT BREAST IN FEMALE, ESTROGEN RECEPTOR NEGATIVE: Primary | ICD-10-CM

## 2023-09-21 PROCEDURE — 1101F PT FALLS ASSESS-DOCD LE1/YR: CPT | Mod: CPTII,S$GLB,, | Performed by: INTERNAL MEDICINE

## 2023-09-21 PROCEDURE — 1126F PR PAIN SEVERITY QUANTIFIED, NO PAIN PRESENT: ICD-10-PCS | Mod: CPTII,S$GLB,, | Performed by: INTERNAL MEDICINE

## 2023-09-21 PROCEDURE — 3288F PR FALLS RISK ASSESSMENT DOCUMENTED: ICD-10-PCS | Mod: CPTII,S$GLB,, | Performed by: INTERNAL MEDICINE

## 2023-09-21 PROCEDURE — 1159F MED LIST DOCD IN RCRD: CPT | Mod: CPTII,S$GLB,, | Performed by: INTERNAL MEDICINE

## 2023-09-21 PROCEDURE — 3078F DIAST BP <80 MM HG: CPT | Mod: CPTII,S$GLB,, | Performed by: INTERNAL MEDICINE

## 2023-09-21 PROCEDURE — 3078F PR MOST RECENT DIASTOLIC BLOOD PRESSURE < 80 MM HG: ICD-10-PCS | Mod: CPTII,S$GLB,, | Performed by: INTERNAL MEDICINE

## 2023-09-21 PROCEDURE — 99214 OFFICE O/P EST MOD 30 MIN: CPT | Mod: S$GLB,,, | Performed by: INTERNAL MEDICINE

## 2023-09-21 PROCEDURE — 1159F PR MEDICATION LIST DOCUMENTED IN MEDICAL RECORD: ICD-10-PCS | Mod: CPTII,S$GLB,, | Performed by: INTERNAL MEDICINE

## 2023-09-21 PROCEDURE — 3288F FALL RISK ASSESSMENT DOCD: CPT | Mod: CPTII,S$GLB,, | Performed by: INTERNAL MEDICINE

## 2023-09-21 PROCEDURE — 3077F PR MOST RECENT SYSTOLIC BLOOD PRESSURE >= 140 MM HG: ICD-10-PCS | Mod: CPTII,S$GLB,, | Performed by: INTERNAL MEDICINE

## 2023-09-21 PROCEDURE — 3077F SYST BP >= 140 MM HG: CPT | Mod: CPTII,S$GLB,, | Performed by: INTERNAL MEDICINE

## 2023-09-21 PROCEDURE — 99214 PR OFFICE/OUTPT VISIT, EST, LEVL IV, 30-39 MIN: ICD-10-PCS | Mod: S$GLB,,, | Performed by: INTERNAL MEDICINE

## 2023-09-21 PROCEDURE — 1126F AMNT PAIN NOTED NONE PRSNT: CPT | Mod: CPTII,S$GLB,, | Performed by: INTERNAL MEDICINE

## 2023-09-21 PROCEDURE — 1101F PR PT FALLS ASSESS DOC 0-1 FALLS W/OUT INJ PAST YR: ICD-10-PCS | Mod: CPTII,S$GLB,, | Performed by: INTERNAL MEDICINE

## 2023-09-21 NOTE — PROGRESS NOTES
[facility] In Office Hematology Oncology Subsequent Encounter Note    9/21/23    Subjective:      Patient ID:   Rosa Aceves  82 y.o. female  1940  [place]      Chief Complaint:   L breast cancer    HPI:  82 y.o. female had R breast cancer in 1996, treated with R MRM at [facility] in [place], [place].  She had adjuvant chemotherapy, no adjuvant Rad Rx.  Not clear, if she had adjuvant Tamoxifen.    Now with abnormal L mammogram.  1.5 cm mass 12 o'clock posteriorly at L breast.  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  Triple negative for ERP, PRP, H2N.    She had L mastectomy, Sentinal node bx 7 weeks ago.  LN negative.  0/1 positive.  She is triple negative.  Check PET.  7/18/22 [facility]    RR estimated 25- 30% at 4-5 years out with observation.  Adjuvant chemo with carboplatin, taxotere q 3 weeks x's 4-6 cycles,  udenyca support, gives RR 15% expected.  Can defer adriamycin administration.    Discussed treatment schema, potential benefit in reducing recurrence risk,  Potential side effects including hair loss, N/V/D, peripheral neuropathy, pancytopenia.  And other side effects.  Other regimens include adriamycin, but she had this 25 years ago and tolerated it with difficulty.    Peripheral veins poor, port placement per Dr. Renzo Bullard.    Tamoxifen, Arimidex not helpful here given negative ERP and PRP status.  Herceptin not helpful here given negative H@N status.  Triple negative dx.    D1C1  8/10/22.    Check BRCA 1 & 2 on her.  Triple negative, Bilateral breast cancer.    Hgb 12.7 to 10, creatinine 1.4.  GFR 43%.  S/P first cycle of chemo.  She had much toxicity.  She does not agree to go forward with #2.    Abnormal cologuard, she need colonoscopy, when she is stronger.  She has Amydesis HHA.    She does not agree to resume further chemotherapy for her cancer condition.  She is stronger however and walking around the house with the assistance of her walker.   Appetite is improved and her weight has gone from 171-179 lb.      Will discontinue the chemotherapy from her chart.  Off chemotherapy, she did not tolerate the Rx.  We are going with observation.  RR 30% expected.    Energy is 7-8/10 now.    C/O pain at R anterior leg just below the knee.  DJD hx.  Add trial of lodine 300 mg 1 daily.  Lodine only helps for 2-3 hours.  She wants to go back to her naprosyn 750 mg daily.    Dx 5/26/22, now 1 1/2 year out since Dx.  S/P bilateral mastectomy.  PF q 6 weeks.  Due 10/4/23.  Daughter takes celexa 10 mg prn HF sx.    ROS:   GEN: normal without any fever, night sweats or weight loss  HEENT: normal with no HA's, sore throat, stiff neck, changes in vision  CV: normal with no CP, SOB, PND, FIGUEREDO or orthopnea  PULM: normal with no SOB, cough, hemoptysis, sputum or pleuritic pain  GI: normal with no abdominal pain, nausea, vomiting, constipation, diarrhea, melanotic stools, BRBPR, or hematemesis  : normal with no hematuria, dysuria  BREAST: See HPI  SKIN: normal with no rash, erythema, bruising, or swelling     Past Medical History:   Diagnosis Date    Aortic valve regurgitation     Benign paroxysmal vertigo, bilateral     Chronic kidney disease, unspecified     Coronary artery disease     Essential (primary) hypertension     Malignant neoplasm of right breast     Mitral valve regurgitation     Obesity, unspecified     Osteopenia     Positive colorectal cancer screening using Cologuard test     Rheumatoid arthritis, unspecified     Ruptured lumbar disc     Unspecified cataract      Past Surgical History:   Procedure Laterality Date    BREAST BIOPSY Left     BREAST SURGERY Left     CHOLECYSTECTOMY      EYE SURGERY      INJECTION FOR SENTINEL NODE IDENTIFICATION Left 5/26/2022    Procedure: INJECTION, FOR SENTINEL NODE IDENTIFICATION;  Surgeon: Renzo Jim MD;  Location: [facility];  Service: General;  Laterality: Left;    INSERTION OF TUNNELED CENTRAL VENOUS CATHETER (CVC)  WITH SUBCUTANEOUS PORT N/A 8/1/2022    Procedure: [id number];  Surgeon: Renzo Jim MD;  Location: [facility] OR;  Service: General;  Laterality: N/A;    MASTECTOMY Left 5/26/2022    Procedure: MASTECTOMY;  Surgeon: Renzo Jim MD;  Location: [facility] OR;  Service: General;  Laterality: Left;    MASTECTOMY, RADICAL Right     SENTINEL LYMPH NODE BIOPSY Left 5/26/2022    Procedure: BIOPSY, LYMPH NODE, SENTINEL;  Surgeon: Renzo Jim MD;  Location: [facility] OR;  Service: General;  Laterality: Left;    TUBAL LIGATION         Review of patient's allergies indicates:  No Known Allergies  Social History     Socioeconomic History    Marital status:     Number of children: 5   Tobacco Use    Smoking status: Never    Smokeless tobacco: Never   Substance and Sexual Activity    Alcohol use: Never    Drug use: Never    Sexual activity: Not Currently     Partners: Male         Current Outpatient Medications:     acetaminophen (TYLENOL) 500 MG tablet, Take 500 mg by mouth every 6 (six) hours as needed., Disp: , Rfl:     dexAMETHasone (DECADRON) 4 MG Tab, Take 8mg the AM and PM before chemotherapy and the AM and PM the day after chemotherapy. Repeat every 21 days., Disp: 120 tablet, Rfl: 1    diphenoxylate-atropine 2.5-0.025 mg (LOMOTIL) 2.5-0.025 mg per tablet, Take 1 tablet by mouth 4 (four) times daily as needed for Diarrhea., Disp: 40 tablet, Rfl: 1    etodolac (LODINE) 300 MG Cap, TAKE 1 CAPSULE(300 MG) BY MOUTH EVERY DAY, Disp: 30 capsule, Rfl: 1    furosemide (LASIX) 40 MG tablet, Take 40 mg by mouth once daily., Disp: , Rfl:     HYDROcodone-acetaminophen (NORCO) 5-325 mg per tablet, Take 1 tablet by mouth every 6 (six) hours as needed for Pain., Disp: 10 tablet, Rfl: 0    HYDROcodone-acetaminophen  mg/15 mL(15 mL) Soln, 1 tablet EVERY 6 HOURS (route: oral), Disp: , Rfl:     LIDOcaine-prilocaine (EMLA) cream, Apply topically as needed (once before port a cath is accessed)., Disp: 5 g, Rfl:  "2    meclizine (ANTIVERT) 25 mg tablet, Take by mouth., Disp: , Rfl:     naproxen (NAPROSYN) 500 MG tablet, TAKE 1 TABLET BY MOUTH DAILY WITH FOOD AS NEEDED FOR PAIN, Disp: 30 tablet, Rfl: 3    olmesartan-amLODIPin-hcthiazid 40-5-25 mg Tab, Take 1 tablet by mouth Daily., Disp: , Rfl:     ondansetron (ZOFRAN) 4 MG tablet, Take 4 mg by mouth once., Disp: , Rfl:     traMADoL (ULTRAM) 50 mg tablet, Take 50 mg by mouth once daily., Disp: , Rfl:     cholestyramine (QUESTRAN) 4 gram packet, Take 1 packet (4 g total) by mouth 2 (two) times daily. (Patient not taking: Reported on 3/14/2023), Disp: 20 packet, Rfl: 1  No current facility-administered medications for this visit.    Facility-Administered Medications Ordered in Other Visits:     electrolyte-S (ISOLYTE), , Intravenous, Continuous, Irwin Russo MD    fentaNYL 50 mcg/mL injection 25 mcg, 25 mcg, Intravenous, Q5 Min PRN, Irwin Russo MD    HYDROmorphone (PF) injection 0.2 mg, 0.2 mg, Intravenous, Q5 Min PRN, Irwin Russo MD    lactated ringers infusion, , Intravenous, Continuous, Irwin Russo MD, Last Rate: 10 mL/hr at 08/01/22 1120, Restarted at 08/01/22 1234    LIDOcaine (PF) 10 mg/ml (1%) injection 10 mg, 1 mL, Intradermal, Once, Irwin Russo MD    oxyCODONE immediate release tablet 5 mg, 5 mg, Oral, Q3H PRN, Irwin Russo MD          Objective:   Vitals:  Blood pressure (!) 186/76, pulse 76, temperature 97.9 °F (36.6 °C), resp. rate 16, height 5' 5" (1.651 m), weight 89 kg (196 lb 3.2 oz).    Physical Examination:   GEN: no apparent distress, comfortable  HEAD: atraumatic and normocephalic  EYES: no pallor, no icterus  ENT:  no pharyngeal erythema, external ears WNL; no nasal discharge  NECK: no masses, thyroid normal, trachea midline, no LAD/LN's, supple  CV: RRR with no murmur; normal pulse; normal S1 and S2; no pedal edema  CHEST: Normal respiratory effort; CTAB; normal breath sounds; no wheeze or crackles  ABDOM: nontender and " "nondistended; soft;  no rebound/guarding, L/S NP  MUSC/Skeletal: ROM normal; no crepitus; joints normal  EXTREM: no clubbing, cyanosis, inflammation or swelling  SKIN: no rashes, lesions, ulcers, petechiae   : no cvat  NEURO: grossly intact; motor/sensory WNL;  no tremors  PSYCH: normal mood, affect and behavior  LYMPH: normal cervical, supraclavicular, axillary and LN's  BREAST: L & R chest wall NT, no palpable mass.  She has decreased ROM at R shoulder and arm.    CBC, CMP, TSH unremarkable    Assessment:   (1) 82 y.o. female with diagnosis of invasive ductal Ca with lobular features at 12 o'clock at L breast.  1.5 cm, Triple negative for ERP, PRP, H2N.  S/P L mastectomy Primary 1.5 cm, LN negative.    (2)Hx of R breast cancer 25 years ago.  R MRM, adjuvant chemotherapy,adjuvant tamoxifen?    (3)Discussed neoadjuvant chemotherapy with Carbo, taxotere or CTX, taxotere, or AC q 3 weeks x's 4-6 cycles.  Followed by L mastectomy and SNBx and adjuvant Rad Rx.  Or L mastectomy, SNBx and no Rad Rx necessary.    (3)Because of age, above medical problems, frail appearance; I am hesitant to go with neoadjuvant chemo initially,  She may not have the stamina for later surgery.    (4)I would go with surgery initially,and then after she recovers, will reconsider her later for adjuvant Rx.?    (5)Check PET for staging, given triple negative dx, R shoulder sx.  PET [facility]ERSON.    (6)S/P L mastectomy, SNBx. 5/26/22    Adjuvant Carbo, Taxotere q 3 weeks x's 4-6 cycles.  Udenyca support.  D1C1 8/10/22.    5'6"   177#    Undue toxicity.  S/P 1 cycle of chemo.  She does not agree to chemo #2.  She is doing better at this time and strengthening.      (7)DJD R knee, Naprosyn prn.    PF at , lab to be drawn from port. 3/2024    Genetic Testing MUTYH +, significant for increased risk of colorectal cancer.  BRCA 1 & 2 were negative.    RTC 6 months.                                "

## 2023-09-21 NOTE — LETTER
September 21, 2023        Es Jj, FNP  [address]  [place] [address]             [facility] - Hematology Oncology  [address] [facility] [address]  Phone: [phone]  Fax: [phone]   Patient: Rosa Aceves   MR Number: [id number]   YOB: 1940   Date of Visit: 9/21/2023       Dear Dr. Jj:    Thank you for referring Rosa Aceves to me for evaluation. Below are the relevant portions of my assessment and plan of care.            If you have questions, please do not hesitate to call me. I look forward to following Rosa along with you.    Sincerely,      YOUNG Jim MD           CC    No Recipients

## 2023-10-05 RX ORDER — HEPARIN 100 UNIT/ML
500 SYRINGE INTRAVENOUS
OUTPATIENT
Start: 2023-10-05

## 2023-10-05 RX ORDER — SODIUM CHLORIDE 0.9 % (FLUSH) 0.9 %
10 SYRINGE (ML) INJECTION
OUTPATIENT
Start: 2023-10-05

## 2023-10-24 ENCOUNTER — OFFICE VISIT (OUTPATIENT)
Dept: PODIATRY | Facility: CLINIC | Age: 83
End: 2023-10-24
Payer: MEDICARE

## 2023-10-24 VITALS
HEIGHT: 65 IN | HEART RATE: 71 BPM | DIASTOLIC BLOOD PRESSURE: 72 MMHG | BODY MASS INDEX: 32.65 KG/M2 | SYSTOLIC BLOOD PRESSURE: 166 MMHG | WEIGHT: 196 LBS

## 2023-10-24 DIAGNOSIS — M20.42 HAMMER TOES OF BOTH FEET: ICD-10-CM

## 2023-10-24 DIAGNOSIS — M20.41 HAMMER TOES OF BOTH FEET: ICD-10-CM

## 2023-10-24 DIAGNOSIS — L60.0 INGROWN NAIL: ICD-10-CM

## 2023-10-24 DIAGNOSIS — L97.511 ULCER OF BOTH FEET, LIMITED TO BREAKDOWN OF SKIN: Primary | ICD-10-CM

## 2023-10-24 DIAGNOSIS — L97.521 ULCER OF BOTH FEET, LIMITED TO BREAKDOWN OF SKIN: Primary | ICD-10-CM

## 2023-10-24 PROCEDURE — 1160F RVW MEDS BY RX/DR IN RCRD: CPT | Mod: CPTII,S$GLB,, | Performed by: PODIATRIST

## 2023-10-24 PROCEDURE — 99213 OFFICE O/P EST LOW 20 MIN: CPT | Mod: S$GLB,,, | Performed by: PODIATRIST

## 2023-10-24 PROCEDURE — 1160F PR REVIEW ALL MEDS BY PRESCRIBER/CLIN PHARMACIST DOCUMENTED: ICD-10-PCS | Mod: CPTII,S$GLB,, | Performed by: PODIATRIST

## 2023-10-24 PROCEDURE — 1125F AMNT PAIN NOTED PAIN PRSNT: CPT | Mod: CPTII,S$GLB,, | Performed by: PODIATRIST

## 2023-10-24 PROCEDURE — 99999 PR PBB SHADOW E&M-EST. PATIENT-LVL IV: ICD-10-PCS | Mod: PBBFAC,,, | Performed by: PODIATRIST

## 2023-10-24 PROCEDURE — 3288F FALL RISK ASSESSMENT DOCD: CPT | Mod: CPTII,S$GLB,, | Performed by: PODIATRIST

## 2023-10-24 PROCEDURE — 1159F PR MEDICATION LIST DOCUMENTED IN MEDICAL RECORD: ICD-10-PCS | Mod: CPTII,S$GLB,, | Performed by: PODIATRIST

## 2023-10-24 PROCEDURE — 1159F MED LIST DOCD IN RCRD: CPT | Mod: CPTII,S$GLB,, | Performed by: PODIATRIST

## 2023-10-24 PROCEDURE — 3078F PR MOST RECENT DIASTOLIC BLOOD PRESSURE < 80 MM HG: ICD-10-PCS | Mod: CPTII,S$GLB,, | Performed by: PODIATRIST

## 2023-10-24 PROCEDURE — 3077F SYST BP >= 140 MM HG: CPT | Mod: CPTII,S$GLB,, | Performed by: PODIATRIST

## 2023-10-24 PROCEDURE — 3288F PR FALLS RISK ASSESSMENT DOCUMENTED: ICD-10-PCS | Mod: CPTII,S$GLB,, | Performed by: PODIATRIST

## 2023-10-24 PROCEDURE — 3078F DIAST BP <80 MM HG: CPT | Mod: CPTII,S$GLB,, | Performed by: PODIATRIST

## 2023-10-24 PROCEDURE — 1125F PR PAIN SEVERITY QUANTIFIED, PAIN PRESENT: ICD-10-PCS | Mod: CPTII,S$GLB,, | Performed by: PODIATRIST

## 2023-10-24 PROCEDURE — 99999 PR PBB SHADOW E&M-EST. PATIENT-LVL IV: CPT | Mod: PBBFAC,,, | Performed by: PODIATRIST

## 2023-10-24 PROCEDURE — 1101F PT FALLS ASSESS-DOCD LE1/YR: CPT | Mod: CPTII,S$GLB,, | Performed by: PODIATRIST

## 2023-10-24 PROCEDURE — 99213 PR OFFICE/OUTPT VISIT, EST, LEVL III, 20-29 MIN: ICD-10-PCS | Mod: S$GLB,,, | Performed by: PODIATRIST

## 2023-10-24 PROCEDURE — 1101F PR PT FALLS ASSESS DOC 0-1 FALLS W/OUT INJ PAST YR: ICD-10-PCS | Mod: CPTII,S$GLB,, | Performed by: PODIATRIST

## 2023-10-24 PROCEDURE — 3077F PR MOST RECENT SYSTOLIC BLOOD PRESSURE >= 140 MM HG: ICD-10-PCS | Mod: CPTII,S$GLB,, | Performed by: PODIATRIST

## 2023-10-24 RX ORDER — LISINOPRIL 40 MG/1
40 TABLET ORAL
COMMUNITY
Start: 2023-10-03

## 2023-10-24 NOTE — PROGRESS NOTES
Subjective:       Patient ID: Rosa Aceves is a 82 y.o. female.    Chief Complaint: Callouses and Ingrown Toenail  Patient presents today she is complaining about painfully ingrown toenails on both feet she also has severe hammertoes her toes are rubbing together and causing irritation patient was treated for breast cancer in May of this year.  Patient also states she has some new calluses that have formed from some new shoes that were too tight and rubbing her toes.    Past Medical History:   Diagnosis Date    Aortic valve regurgitation     Benign paroxysmal vertigo, bilateral     Chronic kidney disease, unspecified     Coronary artery disease     Essential (primary) hypertension     Malignant neoplasm of right breast     Mitral valve regurgitation     Obesity, unspecified     Osteopenia     Positive colorectal cancer screening using Cologuard test     Rheumatoid arthritis, unspecified     Ruptured lumbar disc     Unspecified cataract      Past Surgical History:   Procedure Laterality Date    BREAST BIOPSY Left     BREAST SURGERY Left     CHOLECYSTECTOMY      EYE SURGERY      INJECTION FOR SENTINEL NODE IDENTIFICATION Left 5/26/2022    Procedure: INJECTION, FOR SENTINEL NODE IDENTIFICATION;  Surgeon: Renzo Jim MD;  Location: [facility] OR;  Service: General;  Laterality: Left;    INSERTION OF TUNNELED CENTRAL VENOUS CATHETER (CVC) WITH SUBCUTANEOUS PORT N/A 8/1/2022    Procedure: [id number];  Surgeon: Renzo Jmi MD;  Location: [facility] OR;  Service: General;  Laterality: N/A;    MASTECTOMY Left 5/26/2022    Procedure: MASTECTOMY;  Surgeon: Renzo Jim MD;  Location: [facility] OR;  Service: General;  Laterality: Left;    MASTECTOMY, RADICAL Right     SENTINEL LYMPH NODE BIOPSY Left 5/26/2022    Procedure: BIOPSY, LYMPH NODE, SENTINEL;  Surgeon: Renzo Jim MD;  Location: [facility] OR;  Service: General;  Laterality: Left;    TUBAL LIGATION       Family History   Problem Relation Age of  Onset    Cancer Mother     Heart failure Father     Cancer Sister     Cancer Grandchild      Social History     Socioeconomic History    Marital status:     Number of children: 5   Tobacco Use    Smoking status: Never    Smokeless tobacco: Never   Substance and Sexual Activity    Alcohol use: Never    Drug use: Never    Sexual activity: Not Currently     Partners: Male       Current Outpatient Medications   Medication Sig Dispense Refill    acetaminophen (TYLENOL) 500 MG tablet Take 500 mg by mouth every 6 (six) hours as needed.      dexAMETHasone (DECADRON) 4 MG Tab Take 8mg the AM and PM before chemotherapy and the AM and PM the day after chemotherapy. Repeat every 21 days. 120 tablet 1    diphenoxylate-atropine 2.5-0.025 mg (LOMOTIL) 2.5-0.025 mg per tablet Take 1 tablet by mouth 4 (four) times daily as needed for Diarrhea. 40 tablet 1    etodolac (LODINE) 300 MG Cap TAKE 1 CAPSULE(300 MG) BY MOUTH EVERY DAY 30 capsule 1    furosemide (LASIX) 40 MG tablet Take 40 mg by mouth once daily.      HYDROcodone-acetaminophen (NORCO) 5-325 mg per tablet Take 1 tablet by mouth every 6 (six) hours as needed for Pain. 10 tablet 0    HYDROcodone-acetaminophen  mg/15 mL(15 mL) Soln 1 tablet EVERY 6 HOURS (route: oral)      LIDOcaine-prilocaine (EMLA) cream Apply topically as needed (once before port a cath is accessed). 5 g 2    lisinopriL (PRINIVIL,ZESTRIL) 40 MG tablet Take 40 mg by mouth.      meclizine (ANTIVERT) 25 mg tablet Take by mouth.      naproxen (NAPROSYN) 500 MG tablet TAKE 1 TABLET BY MOUTH DAILY WITH FOOD AS NEEDED FOR PAIN 30 tablet 3    ondansetron (ZOFRAN) 4 MG tablet Take 4 mg by mouth once.      traMADoL (ULTRAM) 50 mg tablet Take 50 mg by mouth once daily.      cholestyramine (QUESTRAN) 4 gram packet Take 1 packet (4 g total) by mouth 2 (two) times daily. (Patient not taking: Reported on 3/14/2023) 20 packet 1     No current facility-administered medications for this visit.  "    Facility-Administered Medications Ordered in Other Visits   Medication Dose Route Frequency Provider Last Rate Last Admin    electrolyte-S (ISOLYTE)   Intravenous Continuous Irwin Russo MD        fentaNYL 50 mcg/mL injection 25 mcg  25 mcg Intravenous Q5 Min PRN Irwin Russo MD        HYDROmorphone (PF) injection 0.2 mg  0.2 mg Intravenous Q5 Min PRN Irwin Russo MD        lactated ringers infusion   Intravenous Continuous Irwin Russo MD 10 mL/hr at 08/01/22 1120 Restarted at 08/01/22 1234    LIDOcaine (PF) 10 mg/ml (1%) injection 10 mg  1 mL Intradermal Once Irwin Russo MD        oxyCODONE immediate release tablet 5 mg  5 mg Oral Q3H PRN Irwin Russo MD         Review of patient's allergies indicates:  No Known Allergies    Review of Systems   Musculoskeletal:  Positive for arthralgias.   All other systems reviewed and are negative.      Objective:      Vitals:    10/24/23 1048   BP: (!) 166/72   BP Location: Left arm   Pulse: 71   Weight: 88.9 kg (196 lb)   Height: 5' 5" (1.651 m)     Physical Exam  Vitals and nursing note reviewed. Exam conducted with a chaperone present.   Constitutional:       Appearance: Normal appearance.   Cardiovascular:      Pulses:           Dorsalis pedis pulses are 1+ on the right side and 1+ on the left side.        Posterior tibial pulses are 0 on the right side and 0 on the left side.   Pulmonary:      Effort: Pulmonary effort is normal.   Musculoskeletal:         General: Tenderness and deformity present.   Feet:      Right foot:      Protective Sensation: 2 sites tested.  2 sites sensed.      Skin integrity: Erythema present.      Toenail Condition: Right toenails are abnormally thick, long and ingrown. Fungal disease present.     Left foot:      Protective Sensation: 2 sites tested.  2 sites sensed.      Skin integrity: Erythema present.      Toenail Condition: Left toenails are abnormally thick, long and ingrown. Fungal disease present.  Skin:     " General: Skin is warm.      Capillary Refill: Capillary refill takes more than 3 seconds.      Findings: Erythema present.   Neurological:      General: No focal deficit present.      Mental Status: She is alert.   Psychiatric:         Mood and Affect: Mood normal.         Behavior: Behavior normal.                                                                              Assessment:       1. Ulcer of both feet, limited to breakdown of skin    2. Ingrown nail    3. Hammer toes of both feet          Plan:       Patient presents today complaining about painfully ingrown toenails on both feet she also has severe hammertoes her toes are rubbing together and causing irritation patient was treated for breast cancer in May of this year.  Patient also states she has some new calluses that have formed from some new shoes that were too tight and rubbing her toes.Patient's family member states that they had no idea that the patient's toenails were so long and so ingrown.   Patient had severely elongated ingrowing toenails on multiple digits bilateral while the patient did not need a nail avulsion I was able to aggressively trim and remove the ingrowing toenails which the patient tolerated well patient noted to me to be immediate relief upon removing the ingrown toenail this was especially noticeable on the bilateral hallux.  Patient does have irritation 5th digit left and needs to make sure she is wearing shoes that accommodate for the digital contracture this irritation could certainly lead to breakdown ulceration which can lead to complication with the patient's peripheral vascular disease.  Patient had several new areas of pre ulcerative callus formation the most significant was overlying the lateral aspect of the 5th metatarsal phalangeal joint right I did debride this area there is a little bit of a sore underlying this area and I advised the patient this needs to be monitored closely but should resolve as long as no  further irritation presents over the area patient also has hyperkeratotic lesion sub 5th metatarsal left all of this is related to the patient's new shoes that were too narrow rubbing and irritating these areas she states she is no longer wearing the shoes and now that she is not wearing the shoes these areas are not as tender as they had been each of these areas were non excisionally debrided.  Recommended follow-up will be as needed if the patient is not getting complete relief of the ingrowing toenails in the next 4-5 days to contact us for follow-up further evaluation treatment but I do recommend keeping the patient's skin well moisturized well hydrated on a daily basis.  Total time including discussion evaluation treatment discussion of treatment options treatment plan removal of ingrown toenails as well as comprehensive new patient exam and documentation equaled 20 minutes.This note was created using [employer] voice recognition software that occasionally misinterpreted phrases or words.

## 2023-11-15 RX ORDER — SODIUM CHLORIDE 0.9 % (FLUSH) 0.9 %
10 SYRINGE (ML) INJECTION
Status: CANCELLED | OUTPATIENT
Start: 2023-11-15

## 2023-11-15 RX ORDER — HEPARIN 100 UNIT/ML
500 SYRINGE INTRAVENOUS
Status: CANCELLED | OUTPATIENT
Start: 2023-11-15

## 2024-02-01 ENCOUNTER — OFFICE VISIT (OUTPATIENT)
Dept: PODIATRY | Facility: CLINIC | Age: 84
End: 2024-02-01
Payer: MEDICARE

## 2024-02-01 VITALS
HEIGHT: 65 IN | SYSTOLIC BLOOD PRESSURE: 139 MMHG | DIASTOLIC BLOOD PRESSURE: 68 MMHG | WEIGHT: 199 LBS | HEART RATE: 64 BPM | BODY MASS INDEX: 33.15 KG/M2

## 2024-02-01 DIAGNOSIS — L97.511 ULCER OF BOTH FEET, LIMITED TO BREAKDOWN OF SKIN: ICD-10-CM

## 2024-02-01 DIAGNOSIS — M20.42 HAMMER TOES OF BOTH FEET: ICD-10-CM

## 2024-02-01 DIAGNOSIS — L97.521 ULCER OF BOTH FEET, LIMITED TO BREAKDOWN OF SKIN: ICD-10-CM

## 2024-02-01 DIAGNOSIS — L60.0 INGROWN NAIL: Primary | ICD-10-CM

## 2024-02-01 DIAGNOSIS — M20.41 HAMMER TOES OF BOTH FEET: ICD-10-CM

## 2024-02-01 PROCEDURE — 99999 PR PBB SHADOW E&M-EST. PATIENT-LVL III: CPT | Mod: PBBFAC,,, | Performed by: PODIATRIST

## 2024-02-01 PROCEDURE — 3078F DIAST BP <80 MM HG: CPT | Mod: CPTII,S$GLB,, | Performed by: PODIATRIST

## 2024-02-01 PROCEDURE — 99213 OFFICE O/P EST LOW 20 MIN: CPT | Mod: S$GLB,,, | Performed by: PODIATRIST

## 2024-02-01 PROCEDURE — 1101F PT FALLS ASSESS-DOCD LE1/YR: CPT | Mod: CPTII,S$GLB,, | Performed by: PODIATRIST

## 2024-02-01 PROCEDURE — 3288F FALL RISK ASSESSMENT DOCD: CPT | Mod: CPTII,S$GLB,, | Performed by: PODIATRIST

## 2024-02-01 PROCEDURE — 1159F MED LIST DOCD IN RCRD: CPT | Mod: CPTII,S$GLB,, | Performed by: PODIATRIST

## 2024-02-01 PROCEDURE — 1126F AMNT PAIN NOTED NONE PRSNT: CPT | Mod: CPTII,S$GLB,, | Performed by: PODIATRIST

## 2024-02-01 PROCEDURE — 3075F SYST BP GE 130 - 139MM HG: CPT | Mod: CPTII,S$GLB,, | Performed by: PODIATRIST

## 2024-02-01 PROCEDURE — 1160F RVW MEDS BY RX/DR IN RCRD: CPT | Mod: CPTII,S$GLB,, | Performed by: PODIATRIST

## 2024-02-01 RX ORDER — HYDRALAZINE HYDROCHLORIDE 50 MG/1
50 TABLET, FILM COATED ORAL 2 TIMES DAILY
COMMUNITY
Start: 2023-11-02

## 2024-02-01 RX ORDER — NIFEDIPINE 90 MG/1
90 TABLET, FILM COATED, EXTENDED RELEASE ORAL
COMMUNITY

## 2024-03-21 ENCOUNTER — OFFICE VISIT (OUTPATIENT)
Dept: HEMATOLOGY/ONCOLOGY | Facility: CLINIC | Age: 84
End: 2024-03-21
Payer: MEDICARE

## 2024-03-21 VITALS
TEMPERATURE: 97 F | HEIGHT: 65 IN | BODY MASS INDEX: 33.34 KG/M2 | WEIGHT: 200.13 LBS | HEART RATE: 72 BPM | SYSTOLIC BLOOD PRESSURE: 130 MMHG | RESPIRATION RATE: 16 BRPM | DIASTOLIC BLOOD PRESSURE: 61 MMHG

## 2024-03-21 DIAGNOSIS — C50.112 MALIGNANT NEOPLASM OF CENTRAL PORTION OF LEFT BREAST IN FEMALE, ESTROGEN RECEPTOR NEGATIVE: Primary | ICD-10-CM

## 2024-03-21 DIAGNOSIS — Z17.1 MALIGNANT NEOPLASM OF CENTRAL PORTION OF LEFT BREAST IN FEMALE, ESTROGEN RECEPTOR NEGATIVE: Primary | ICD-10-CM

## 2024-03-21 DIAGNOSIS — C50.919 TRIPLE NEGATIVE MALIGNANT NEOPLASM OF BREAST: ICD-10-CM

## 2024-03-21 PROCEDURE — 3078F DIAST BP <80 MM HG: CPT | Mod: CPTII,S$GLB,, | Performed by: INTERNAL MEDICINE

## 2024-03-21 PROCEDURE — 3075F SYST BP GE 130 - 139MM HG: CPT | Mod: CPTII,S$GLB,, | Performed by: INTERNAL MEDICINE

## 2024-03-21 PROCEDURE — 1159F MED LIST DOCD IN RCRD: CPT | Mod: CPTII,S$GLB,, | Performed by: INTERNAL MEDICINE

## 2024-03-21 PROCEDURE — 1101F PT FALLS ASSESS-DOCD LE1/YR: CPT | Mod: CPTII,S$GLB,, | Performed by: INTERNAL MEDICINE

## 2024-03-21 PROCEDURE — 3288F FALL RISK ASSESSMENT DOCD: CPT | Mod: CPTII,S$GLB,, | Performed by: INTERNAL MEDICINE

## 2024-03-21 PROCEDURE — 99214 OFFICE O/P EST MOD 30 MIN: CPT | Mod: S$GLB,,, | Performed by: INTERNAL MEDICINE

## 2024-03-21 PROCEDURE — 1126F AMNT PAIN NOTED NONE PRSNT: CPT | Mod: CPTII,S$GLB,, | Performed by: INTERNAL MEDICINE

## 2024-03-29 NOTE — PROGRESS NOTES
[facility] In Office Hematology Oncology Subsequent Encounter Note    3/21/24    Subjective:      Patient ID:   Rosa Aceves  83 y.o. female  1940  [place]      Chief Complaint:   L breast cancer    HPI:  83 y.o. female had R breast cancer in 1996, treated with R MRM at [facility] in [place], [place].  She had adjuvant chemotherapy, no adjuvant Rad Rx.  Not clear, if she had adjuvant Tamoxifen.    Now with abnormal L mammogram.  1.5 cm mass 12 o'clock posteriorly at L breast.  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 5/2022.  Grade 3.  Triple negative for ERP, PRP, H2N.    She had L mastectomy, Sentinal node bx 7 weeks ago.  LN negative.  0/1 positive.  She is triple negative.  Check PET.  7/18/22 [facility]    RR estimated 25- 30% at 4-5 years out with observation.  Adjuvant chemo with carboplatin, taxotere q 3 weeks x's 4-6 cycles,  udenyca support, gives RR 15% expected.  Can defer adriamycin administration.    Discussed treatment schema, potential benefit in reducing recurrence risk,  Potential side effects including hair loss, N/V/D, peripheral neuropathy, pancytopenia.  And other side effects.  Other regimens include adriamycin, but she had this 25 years ago and tolerated it with difficulty.    Peripheral veins poor, port placement per Dr. Renzo Bullard.    Tamoxifen, Arimidex not helpful here given negative ERP and PRP status.  Herceptin not helpful here given negative H@N status.  Triple negative dx.    D1C1  8/10/22.    Check BRCA 1 & 2 on her.  Triple negative, Bilateral breast cancer.    Hgb 12.7 to 10, creatinine 1.4.  GFR 43%.  S/P first cycle of chemo.  She had much toxicity.  She does not agree to go forward with #2.    Abnormal cologuard, she need colonoscopy, when she is stronger.  She has Amydesis HHA.    She does not agree to resume further chemotherapy for her cancer condition.  She is stronger however and walking around the house with the assistance of her  walker.  Appetite is improved and her weight has gone from 171-179 lb.      Will discontinue the chemotherapy from her chart.  Off chemotherapy, she did not tolerate the Rx.  We are going with observation.  RR 30% expected.    Energy is 7-8/10 now.    C/O pain at R anterior leg just below the knee.  DJD hx.  Add trial of lodine 300 mg 1 daily.  Lodine only helps for 2-3 hours.  She wants to go back to her naprosyn 750 mg daily.    Dx 5/26/22, now 1 1/2 year out since Dx.  S/P bilateral mastectomy.  PF q 6 weeks.  Due 10/4/23.  Daughter takes celexa 10 mg prn HF sx.    PF yesterday.  Cologard test negative.  K 5.1, hold KCL pill.    ROS:   GEN: normal without any fever, night sweats or weight loss  HEENT: normal with no HA's, sore throat, stiff neck, changes in vision  CV: normal with no CP, SOB, PND, FIGUEREDO or orthopnea  PULM: normal with no SOB, cough, hemoptysis, sputum or pleuritic pain  GI: normal with no abdominal pain, nausea, vomiting, constipation, diarrhea, melanotic stools, BRBPR, or hematemesis  : normal with no hematuria, dysuria  BREAST: See HPI  SKIN: normal with no rash, erythema, bruising, or swelling     Past Medical History:   Diagnosis Date    Aortic valve regurgitation     Benign paroxysmal vertigo, bilateral     Chronic kidney disease, unspecified     Coronary artery disease     Essential (primary) hypertension     Malignant neoplasm of right breast     Mitral valve regurgitation     Obesity, unspecified     Osteopenia     Positive colorectal cancer screening using Cologuard test     Rheumatoid arthritis, unspecified     Ruptured lumbar disc     Unspecified cataract      Past Surgical History:   Procedure Laterality Date    BREAST BIOPSY Left     BREAST SURGERY Left     CHOLECYSTECTOMY      EYE SURGERY      INJECTION FOR SENTINEL NODE IDENTIFICATION Left 5/26/2022    Procedure: INJECTION, FOR SENTINEL NODE IDENTIFICATION;  Surgeon: Renzo Jim MD;  Location: [facility];  Service: General;   Laterality: Left;    INSERTION OF TUNNELED CENTRAL VENOUS CATHETER (CVC) WITH SUBCUTANEOUS PORT N/A 8/1/2022    Procedure: [id number];  Surgeon: Renzo Jim MD;  Location[facility] OR;  Service: General;  Laterality: N/A;    MASTECTOMY Left 5/26/2022    Procedure: MASTECTOMY;  Surgeon: Renzo Jim MD;  Location: [facility] OR;  Service: General;  Laterality: Left;    MASTECTOMY, RADICAL Right     SENTINEL LYMPH NODE BIOPSY Left 5/26/2022    Procedure: BIOPSY, LYMPH NODE, SENTINEL;  Surgeon: Renzo Jim MD;  Location: [facility] OR;  Service: General;  Laterality: Left;    TUBAL LIGATION         Review of patient's allergies indicates:  No Known Allergies  Social History     Socioeconomic History    Marital status:     Number of children: 5   Tobacco Use    Smoking status: Never    Smokeless tobacco: Never   Substance and Sexual Activity    Alcohol use: Never    Drug use: Never    Sexual activity: Not Currently     Partners: Male         Current Outpatient Medications:     acetaminophen (TYLENOL) 500 MG tablet, Take 500 mg by mouth every 6 (six) hours as needed., Disp: , Rfl:     hydrALAZINE (APRESOLINE) 50 MG tablet, Take 50 mg by mouth 2 (two) times daily., Disp: , Rfl:     lisinopriL (PRINIVIL,ZESTRIL) 40 MG tablet, Take 40 mg by mouth., Disp: , Rfl:     NIFEdipine (ADALAT CC) 90 MG TbSR, Take 90 mg by mouth., Disp: , Rfl:   No current facility-administered medications for this visit.    Facility-Administered Medications Ordered in Other Visits:     electrolyte-S (ISOLYTE), , Intravenous, Continuous, Irwin Russo MD    fentaNYL 50 mcg/mL injection 25 mcg, 25 mcg, Intravenous, Q5 Min PRN, Irwin Russo MD    HYDROmorphone (PF) injection 0.2 mg, 0.2 mg, Intravenous, Q5 Min PRN, Irwin Russo MD    lactated ringers infusion, , Intravenous, Continuous, Irwin Russo MD, Last Rate: 10 mL/hr at 08/01/22 1120, Restarted at 08/01/22 1234    LIDOcaine (PF) 10 mg/ml (1%) injection 10  "mg, 1 mL, Intradermal, Once, Irwin Russo MD    oxyCODONE immediate release tablet 5 mg, 5 mg, Oral, Q3H PRN, Irwin Russo MD          Objective:   Vitals:  Blood pressure 130/61, pulse 72, temperature 97.2 °F (36.2 °C), resp. rate 16, height 5' 5" (1.651 m), weight 90.8 kg (200 lb 1.6 oz).    Physical Examination:   GEN: no apparent distress, comfortable  HEAD: atraumatic and normocephalic  EYES: no pallor, no icterus  ENT:  no pharyngeal erythema, external ears WNL; no nasal discharge  NECK: no masses, thyroid normal, trachea midline, no LAD/LN's, supple  CV: RRR with no murmur; normal pulse; normal S1 and S2; no pedal edema  CHEST: Normal respiratory effort; CTAB; normal breath sounds; no wheeze or crackles  ABDOM: nontender and nondistended; soft;  no rebound/guarding, L/S NP  MUSC/Skeletal: ROM normal; no crepitus; joints normal  EXTREM: no clubbing, cyanosis, inflammation or swelling  SKIN: no rashes, lesions, ulcers, petechiae   : no cvat  NEURO: grossly intact; motor/sensory WNL;  no tremors  PSYCH: normal mood, affect and behavior  LYMPH: normal cervical, supraclavicular, axillary and LN's  BREAST: L & R chest wall NT, no palpable mass.  She has decreased ROM at R shoulder and arm.    CBC, CMP, TSH unremarkable    Assessment:   (1) 83 y.o. female with diagnosis of invasive ductal Ca with lobular features at 12 o'clock at L breast.  1.5 cm, Triple negative for ERP, PRP, H2N.  S/P L mastectomy Primary 1.5 cm, LN negative.    (2)Hx of R breast cancer 25 years ago.  R MRM, adjuvant chemotherapy,adjuvant tamoxifen?    (3)Discussed neoadjuvant chemotherapy with Carbo, taxotere or CTX, taxotere, or AC q 3 weeks x's 4-6 cycles.  Followed by L mastectomy and SNBx and adjuvant Rad Rx.  Or L mastectomy, SNBx and no Rad Rx necessary.    (3)Because of age, above medical problems, frail appearance; I am hesitant to go with neoadjuvant chemo initially,  She may not have the stamina for later surgery.    (4)I " "would go with surgery initially,and then after she recovers, will reconsider her later for adjuvant Rx.?    (5)Check PET for staging, given triple negative dx, R shoulder sx.  PET [facility]ERSON.    (6)S/P L mastectomy, SNBx. 5/26/22    Adjuvant Carbo, Taxotere q 3 weeks x's 4-6 cycles.  Udenyca support.  D1C1 8/10/22.    5'6"   177#    Undue toxicity.  S/P 1 cycle of chemo.  She does not agree to chemo #2.  She is doing better at this time and strengthening.      (7)Scott Hicks prn.    PF at , lab to be drawn from port. 3/2024    Genetic Testing MUTYH +, significant for increased risk of colorectal cancer.  BRCA 1 & 2 were negative.    RTC 6 months.                                "

## 2024-04-02 ENCOUNTER — OFFICE VISIT (OUTPATIENT)
Dept: PODIATRY | Facility: CLINIC | Age: 84
End: 2024-04-02
Payer: MEDICARE

## 2024-04-02 VITALS
HEART RATE: 71 BPM | BODY MASS INDEX: 33.35 KG/M2 | WEIGHT: 200.19 LBS | HEIGHT: 65 IN | SYSTOLIC BLOOD PRESSURE: 150 MMHG | DIASTOLIC BLOOD PRESSURE: 77 MMHG

## 2024-04-02 DIAGNOSIS — L97.511 ULCER OF BOTH FEET, LIMITED TO BREAKDOWN OF SKIN: ICD-10-CM

## 2024-04-02 DIAGNOSIS — M20.41 HAMMER TOES OF BOTH FEET: ICD-10-CM

## 2024-04-02 DIAGNOSIS — M20.42 HAMMER TOES OF BOTH FEET: ICD-10-CM

## 2024-04-02 DIAGNOSIS — L97.521 ULCER OF BOTH FEET, LIMITED TO BREAKDOWN OF SKIN: ICD-10-CM

## 2024-04-02 DIAGNOSIS — L60.0 INGROWN NAIL: Primary | ICD-10-CM

## 2024-04-02 PROCEDURE — 1126F AMNT PAIN NOTED NONE PRSNT: CPT | Mod: CPTII,S$GLB,, | Performed by: PODIATRIST

## 2024-04-02 PROCEDURE — 1159F MED LIST DOCD IN RCRD: CPT | Mod: CPTII,S$GLB,, | Performed by: PODIATRIST

## 2024-04-02 PROCEDURE — 1101F PT FALLS ASSESS-DOCD LE1/YR: CPT | Mod: CPTII,S$GLB,, | Performed by: PODIATRIST

## 2024-04-02 PROCEDURE — 3078F DIAST BP <80 MM HG: CPT | Mod: CPTII,S$GLB,, | Performed by: PODIATRIST

## 2024-04-02 PROCEDURE — 3077F SYST BP >= 140 MM HG: CPT | Mod: CPTII,S$GLB,, | Performed by: PODIATRIST

## 2024-04-02 PROCEDURE — 99213 OFFICE O/P EST LOW 20 MIN: CPT | Mod: S$GLB,,, | Performed by: PODIATRIST

## 2024-04-02 PROCEDURE — 3288F FALL RISK ASSESSMENT DOCD: CPT | Mod: CPTII,S$GLB,, | Performed by: PODIATRIST

## 2024-04-02 PROCEDURE — 1160F RVW MEDS BY RX/DR IN RCRD: CPT | Mod: CPTII,S$GLB,, | Performed by: PODIATRIST

## 2024-04-02 PROCEDURE — 99999 PR PBB SHADOW E&M-EST. PATIENT-LVL III: CPT | Mod: PBBFAC,,, | Performed by: PODIATRIST

## 2024-06-04 ENCOUNTER — OFFICE VISIT (OUTPATIENT)
Dept: PODIATRY | Facility: CLINIC | Age: 84
End: 2024-06-04
Payer: MEDICARE

## 2024-06-04 VITALS
HEIGHT: 65 IN | HEART RATE: 59 BPM | WEIGHT: 200.19 LBS | BODY MASS INDEX: 33.35 KG/M2 | DIASTOLIC BLOOD PRESSURE: 66 MMHG | SYSTOLIC BLOOD PRESSURE: 145 MMHG

## 2024-06-04 DIAGNOSIS — L97.511 ULCER OF BOTH FEET, LIMITED TO BREAKDOWN OF SKIN: ICD-10-CM

## 2024-06-04 DIAGNOSIS — L60.0 INGROWN NAIL: Primary | ICD-10-CM

## 2024-06-04 DIAGNOSIS — L97.521 ULCER OF BOTH FEET, LIMITED TO BREAKDOWN OF SKIN: ICD-10-CM

## 2024-06-04 DIAGNOSIS — M20.42 HAMMER TOES OF BOTH FEET: ICD-10-CM

## 2024-06-04 DIAGNOSIS — M20.41 HAMMER TOES OF BOTH FEET: ICD-10-CM

## 2024-06-04 PROCEDURE — 3288F FALL RISK ASSESSMENT DOCD: CPT | Mod: CPTII,S$GLB,, | Performed by: PODIATRIST

## 2024-06-04 PROCEDURE — 1126F AMNT PAIN NOTED NONE PRSNT: CPT | Mod: CPTII,S$GLB,, | Performed by: PODIATRIST

## 2024-06-04 PROCEDURE — 1159F MED LIST DOCD IN RCRD: CPT | Mod: CPTII,S$GLB,, | Performed by: PODIATRIST

## 2024-06-04 PROCEDURE — 1101F PT FALLS ASSESS-DOCD LE1/YR: CPT | Mod: CPTII,S$GLB,, | Performed by: PODIATRIST

## 2024-06-04 PROCEDURE — 99999 PR PBB SHADOW E&M-EST. PATIENT-LVL III: CPT | Mod: PBBFAC,,, | Performed by: PODIATRIST

## 2024-06-04 PROCEDURE — 99213 OFFICE O/P EST LOW 20 MIN: CPT | Mod: S$GLB,,, | Performed by: PODIATRIST

## 2024-06-04 PROCEDURE — 3078F DIAST BP <80 MM HG: CPT | Mod: CPTII,S$GLB,, | Performed by: PODIATRIST

## 2024-06-04 PROCEDURE — 1160F RVW MEDS BY RX/DR IN RCRD: CPT | Mod: CPTII,S$GLB,, | Performed by: PODIATRIST

## 2024-06-04 PROCEDURE — 3077F SYST BP >= 140 MM HG: CPT | Mod: CPTII,S$GLB,, | Performed by: PODIATRIST

## 2024-06-04 RX ORDER — DIAZEPAM 2 MG/1
2 TABLET ORAL
COMMUNITY
Start: 2024-04-18 | End: 2025-04-18

## 2024-06-07 NOTE — PROGRESS NOTES
Subjective:       Patient ID: Rosa Aceves is a 83 y.o. female.    Chief Complaint: Ingrown Toenail  Patient presents today she is complaining about painfully ingrown toenails on both feet she also has severe hammertoes her toes are rubbing together and causing irritation patient was treated for breast cancer in May of this year.  Patient also states she has some new calluses that have formed from some new shoes that were too tight and rubbing her toes.    Past Medical History:   Diagnosis Date    Aortic valve regurgitation     Benign paroxysmal vertigo, bilateral     Chronic kidney disease, unspecified     Coronary artery disease     Essential (primary) hypertension     Malignant neoplasm of right breast     Mitral valve regurgitation     Obesity, unspecified     Osteopenia     Positive colorectal cancer screening using Cologuard test     Rheumatoid arthritis, unspecified     Ruptured lumbar disc     Unspecified cataract      Past Surgical History:   Procedure Laterality Date    BREAST BIOPSY Left     BREAST SURGERY Left     CHOLECYSTECTOMY      EYE SURGERY      INJECTION FOR SENTINEL NODE IDENTIFICATION Left 5/26/2022    Procedure: INJECTION, FOR SENTINEL NODE IDENTIFICATION;  Surgeon: Renzo Jim MD;  Location: [facility] OR;  Service: General;  Laterality: Left;    INSERTION OF TUNNELED CENTRAL VENOUS CATHETER (CVC) WITH SUBCUTANEOUS PORT N/A 8/1/2022    Procedure: [id number]-[id number];  Surgeon: Renzo Jim MD;  Location: [facility] OR;  Service: General;  Laterality: N/A;    MASTECTOMY Left 5/26/2022    Procedure: MASTECTOMY;  Surgeon: Renzo Jim MD;  Location: [facility] OR;  Service: General;  Laterality: Left;    MASTECTOMY, RADICAL Right     SENTINEL LYMPH NODE BIOPSY Left 5/26/2022    Procedure: BIOPSY, LYMPH NODE, SENTINEL;  Surgeon: Renzo Jim MD;  Location: [facility] OR;  Service: General;  Laterality: Left;    TUBAL LIGATION       Family History   Problem Relation Name Age of Onset     Cancer Mother      Heart failure Father      Cancer Sister      Cancer Grandchild       Social History     Socioeconomic History    Marital status:     Number of children: 5   Tobacco Use    Smoking status: Never    Smokeless tobacco: Never   Substance and Sexual Activity    Alcohol use: Never    Drug use: Never    Sexual activity: Not Currently     Partners: Male     Social Determinants of Health     Financial Resource Strain: Low Risk  (4/18/2024)    Received from [facility]    Overall Financial Resource Strain (CARDIA)     Difficulty of Paying Living Expenses: Not hard at all   Food Insecurity: No Food Insecurity (4/18/2024)    Received from [facility]    Hunger Vital Sign     Worried About Running Out of Food in the Last Year: Never true     Ran Out of Food in the Last Year: Never true   Transportation Needs: No Transportation Needs (4/18/2024)    Received from [facility]    PRAPARE - Transportation     Lack of Transportation (Medical): No     Lack of Transportation (Non-Medical): No   Physical Activity: Inactive (4/18/2024)    Received from [facility]    Exercise Vital Sign     Days of Exercise per Week: 0 days     Minutes of Exercise per Session: 0 min   Stress: No Stress Concern Present (4/18/2024)    Received from [facility]    [employer] [place] [employer] - Occupational Stress Questionnaire     Feeling of Stress : Not at all       Current Outpatient Medications   Medication Sig Dispense Refill    acetaminophen (TYLENOL) 500 MG tablet Take 500 mg by mouth every 6 (six) hours as needed.      hydrALAZINE (APRESOLINE) 50 MG tablet Take 50 mg by mouth 2 (two) times daily.      lisinopriL (PRINIVIL,ZESTRIL) 40 MG tablet Take 40 mg by mouth.      NIFEdipine (ADALAT CC) 90 MG TbSR Take 90 mg by mouth.      diazePAM  "(VALIUM) 2 MG tablet Take 2 mg by mouth. (Patient not taking: Reported on 6/4/2024)       No current facility-administered medications for this visit.     Facility-Administered Medications Ordered in Other Visits   Medication Dose Route Frequency Provider Last Rate Last Admin    electrolyte-S (ISOLYTE)   Intravenous Continuous Irwin Russo MD        fentaNYL 50 mcg/mL injection 25 mcg  25 mcg Intravenous Q5 Min PRN Irwin Russo MD        HYDROmorphone (PF) injection 0.2 mg  0.2 mg Intravenous Q5 Min PRN Irwin Russo MD        lactated ringers infusion   Intravenous Continuous Irwin Russo MD 10 mL/hr at 08/01/22 1120 Restarted at 08/01/22 1234    LIDOcaine (PF) 10 mg/ml (1%) injection 10 mg  1 mL Intradermal Once Irwin Russo MD        oxyCODONE immediate release tablet 5 mg  5 mg Oral Q3H PRN Irwin Russo MD         Review of patient's allergies indicates:  No Known Allergies    Review of Systems   Musculoskeletal:  Positive for arthralgias.   All other systems reviewed and are negative.      Objective:      Vitals:    06/04/24 1127   BP: (!) 145/66   BP Location: Left forearm   Patient Position: Sitting   Pulse: (!) 59   Weight: 90.8 kg (200 lb 2.8 oz)   Height: 5' 5" (1.651 m)     Physical Exam  Vitals and nursing note reviewed. Exam conducted with a chaperone present.   Constitutional:       Appearance: Normal appearance.   Cardiovascular:      Pulses:           Dorsalis pedis pulses are 1+ on the right side and 1+ on the left side.        Posterior tibial pulses are 0 on the right side and 0 on the left side.   Pulmonary:      Effort: Pulmonary effort is normal.   Musculoskeletal:         General: Tenderness and deformity present.   Feet:      Right foot:      Protective Sensation: 2 sites tested.  2 sites sensed.      Skin integrity: Erythema present.      Toenail Condition: Right toenails are abnormally thick, long and ingrown. Fungal disease present.     Left foot:      Protective " Sensation: 2 sites tested.  2 sites sensed.      Skin integrity: Erythema present.      Toenail Condition: Left toenails are abnormally thick, long and ingrown. Fungal disease present.  Skin:     General: Skin is warm.      Capillary Refill: Capillary refill takes more than 3 seconds.      Findings: Erythema present.   Neurological:      General: No focal deficit present.      Mental Status: She is alert.   Psychiatric:         Mood and Affect: Mood normal.         Behavior: Behavior normal.                                                                                        Assessment:       1. Ingrown nail    2. Hammer toes of both feet    3. Ulcer of both feet, limited to breakdown of skin          Plan:       Patient presents today complaining about painfully ingrown toenails on both feet she also has severe hammertoes her toes are rubbing together and causing irritation patient was treated for breast cancer in May of this year.  Patient also states she has some new calluses that have formed from some new shoes that were too tight and rubbing her toes.Patient's family member states that they had no idea that the patient's toenails were so long and so ingrown.   Patient had severely elongated ingrowing toenails on multiple digits bilateral while the patient did not need a nail avulsion I was able to aggressively trim and remove the ingrowing toenails which the patient tolerated well patient noted to me to be immediate relief upon removing the ingrown toenail this was especially noticeable on the bilateral hallux.  Patient does have irritation 5th digit left and needs to make sure she is wearing shoes that accommodate for the digital contracture this irritation could certainly lead to breakdown ulceration which can lead to complication with the patient's peripheral vascular disease.  Patient had several new areas of pre ulcerative callus formation the most significant was overlying the lateral aspect of the 5th  metatarsal phalangeal joint right I did debride this area there is a little bit of a sore underlying this area and I advised the patient this needs to be monitored closely but should resolve as long as no further irritation presents over the area patient also has hyperkeratotic lesion sub 5th metatarsal left all of this is related to the patient's new shoes that were too narrow rubbing and irritating these areas she states she is no longer wearing the shoes and now that she is not wearing the shoes these areas are not as tender as they had been each of these areas were non excisionally debrided.  Recommended follow-up will be as needed if the patient is not getting complete relief of the ingrowing toenails in the next 4-5 days to contact us for follow-up further evaluation treatment but I do recommend keeping the patient's skin well moisturized well hydrated on a daily basis.  Total time including discussion evaluation treatment discussion of treatment options treatment plan removal of ingrown toenails as well as comprehensive  follow-uppatient exam and documentation equaled 20 minutes.This note was created using [employer] voice recognition software that occasionally misinterpreted phrases or words.

## 2024-08-08 ENCOUNTER — OFFICE VISIT (OUTPATIENT)
Dept: PODIATRY | Facility: CLINIC | Age: 84
End: 2024-08-08
Payer: MEDICARE

## 2024-08-08 VITALS
BODY MASS INDEX: 33.35 KG/M2 | SYSTOLIC BLOOD PRESSURE: 188 MMHG | HEIGHT: 65 IN | DIASTOLIC BLOOD PRESSURE: 79 MMHG | WEIGHT: 200.19 LBS | HEART RATE: 63 BPM

## 2024-08-08 DIAGNOSIS — L97.511 ULCER OF BOTH FEET, LIMITED TO BREAKDOWN OF SKIN: ICD-10-CM

## 2024-08-08 DIAGNOSIS — M20.42 HAMMER TOES OF BOTH FEET: ICD-10-CM

## 2024-08-08 DIAGNOSIS — L97.521 ULCER OF BOTH FEET, LIMITED TO BREAKDOWN OF SKIN: ICD-10-CM

## 2024-08-08 DIAGNOSIS — M20.41 HAMMER TOES OF BOTH FEET: ICD-10-CM

## 2024-08-08 DIAGNOSIS — L60.0 INGROWN NAIL: Primary | ICD-10-CM

## 2024-08-08 PROCEDURE — 99999 PR PBB SHADOW E&M-EST. PATIENT-LVL III: CPT | Mod: PBBFAC,,, | Performed by: PODIATRIST

## 2024-08-08 PROCEDURE — 1126F AMNT PAIN NOTED NONE PRSNT: CPT | Mod: CPTII,S$GLB,, | Performed by: PODIATRIST

## 2024-08-08 PROCEDURE — 1101F PT FALLS ASSESS-DOCD LE1/YR: CPT | Mod: CPTII,S$GLB,, | Performed by: PODIATRIST

## 2024-08-08 PROCEDURE — 1159F MED LIST DOCD IN RCRD: CPT | Mod: CPTII,S$GLB,, | Performed by: PODIATRIST

## 2024-08-08 PROCEDURE — 3288F FALL RISK ASSESSMENT DOCD: CPT | Mod: CPTII,S$GLB,, | Performed by: PODIATRIST

## 2024-08-08 PROCEDURE — 99213 OFFICE O/P EST LOW 20 MIN: CPT | Mod: S$GLB,,, | Performed by: PODIATRIST

## 2024-08-08 PROCEDURE — 3077F SYST BP >= 140 MM HG: CPT | Mod: CPTII,S$GLB,, | Performed by: PODIATRIST

## 2024-08-08 PROCEDURE — 3078F DIAST BP <80 MM HG: CPT | Mod: CPTII,S$GLB,, | Performed by: PODIATRIST

## 2024-08-08 PROCEDURE — 1160F RVW MEDS BY RX/DR IN RCRD: CPT | Mod: CPTII,S$GLB,, | Performed by: PODIATRIST

## 2024-08-11 NOTE — PROGRESS NOTES
Subjective:       Patient ID: Rosa Aceves is a 83 y.o. female.    Chief Complaint: Ingrown Toenail and Callouses  Patient presents today she is complaining about painfully ingrown toenails on both feet she also has severe hammertoes her toes are rubbing together and causing irritation patient was treated for breast cancer in May of this year.  Patient also states she has some new calluses that have formed from some new shoes that were too tight and rubbing her toes.    Past Medical History:   Diagnosis Date    Aortic valve regurgitation     Benign paroxysmal vertigo, bilateral     Chronic kidney disease, unspecified     Coronary artery disease     Essential (primary) hypertension     Malignant neoplasm of right breast     Mitral valve regurgitation     Obesity, unspecified     Osteopenia     Positive colorectal cancer screening using Cologuard test     Rheumatoid arthritis, unspecified     Ruptured lumbar disc     Unspecified cataract      Past Surgical History:   Procedure Laterality Date    BREAST BIOPSY Left     BREAST SURGERY Left     CHOLECYSTECTOMY      EYE SURGERY      INJECTION FOR SENTINEL NODE IDENTIFICATION Left 5/26/2022    Procedure: INJECTION, FOR SENTINEL NODE IDENTIFICATION;  Surgeon: Renzo Jim MD;  Location: [facility] OR;  Service: General;  Laterality: Left;    INSERTION OF TUNNELED CENTRAL VENOUS CATHETER (CVC) WITH SUBCUTANEOUS PORT N/A 8/1/2022    Procedure: [id number];  Surgeon: Renzo Jim MD;  Location: [facility] OR;  Service: General;  Laterality: N/A;    MASTECTOMY Left 5/26/2022    Procedure: MASTECTOMY;  Surgeon: Renzo Jim MD;  Location: [facility] OR;  Service: General;  Laterality: Left;    MASTECTOMY, RADICAL Right     SENTINEL LYMPH NODE BIOPSY Left 5/26/2022    Procedure: BIOPSY, LYMPH NODE, SENTINEL;  Surgeon: Renzo Jim MD;  Location: [facility] OR;  Service: General;  Laterality: Left;    TUBAL LIGATION       Family History   Problem Relation Name Age  of Onset    Cancer Mother      Heart failure Father      Cancer Sister      Cancer Grandchild       Social History     Socioeconomic History    Marital status:     Number of children: 5   Tobacco Use    Smoking status: Never    Smokeless tobacco: Never   Substance and Sexual Activity    Alcohol use: Never    Drug use: Never    Sexual activity: Not Currently     Partners: Male     Social Determinants of Health     Financial Resource Strain: Low Risk  (4/18/2024)    Received [facility]    Overall Financial Resource Strain (CARDIA)     Difficulty of Paying Living Expenses: Not hard at all   Food Insecurity: No Food Insecurity (4/18/2024)    Received from [facility]    Hunger Vital Sign     Worried About Running Out of Food in the Last Year: Never true     Ran Out of Food in the Last Year: Never true   Transportation Needs: No Transportation Needs (4/18/2024)    Received from [facility]    PRAPARE - Transportation     Lack of Transportation (Medical): No     Lack of Transportation (Non-Medical): No   Physical Activity: Inactive (4/18/2024)    Received from [facility]    Exercise Vital Sign     Days of Exercise per Week: 0 days     Minutes of Exercise per Session: 0 min   Stress: No Stress Concern Present (4/18/2024)    Received from [facility]    [employer] [place] [employer] - Occupational Stress Questionnaire     Feeling of Stress : Not at all       Current Outpatient Medications   Medication Sig Dispense Refill    acetaminophen (TYLENOL) 500 MG tablet Take 500 mg by mouth every 6 (six) hours as needed.      diazePAM (VALIUM) 2 MG tablet Take 2 mg by mouth.      hydrALAZINE (APRESOLINE) 50 MG tablet Take 50 mg by mouth 2 (two) times daily.      lisinopriL (PRINIVIL,ZESTRIL) 40 MG tablet Take 40 mg by mouth.       "NIFEdipine (ADALAT CC) 90 MG TbSR Take 90 mg by mouth.       No current facility-administered medications for this visit.     Facility-Administered Medications Ordered in Other Visits   Medication Dose Route Frequency Provider Last Rate Last Admin    electrolyte-S (ISOLYTE)   Intravenous Continuous Irwin Russo MD        fentaNYL 50 mcg/mL injection 25 mcg  25 mcg Intravenous Q5 Min PRN Irwin Russo MD        HYDROmorphone (PF) injection 0.2 mg  0.2 mg Intravenous Q5 Min PRN Irwin Russo MD        lactated ringers infusion   Intravenous Continuous Irwin Russo MD 10 mL/hr at 08/01/22 1120 Restarted at 08/01/22 1234    LIDOcaine (PF) 10 mg/ml (1%) injection 10 mg  1 mL Intradermal Once Irwin Russo MD        oxyCODONE immediate release tablet 5 mg  5 mg Oral Q3H PRN Irwin Russo MD         Review of patient's allergies indicates:  No Known Allergies    Review of Systems   Musculoskeletal:  Positive for arthralgias.   All other systems reviewed and are negative.      Objective:      Vitals:    08/08/24 1109   BP: (!) 188/79   BP Location: Right arm   Patient Position: Sitting   Pulse: 63   Weight: 90.8 kg (200 lb 2.8 oz)   Height: 5' 5" (1.651 m)     Physical Exam  Vitals and nursing note reviewed. Exam conducted with a chaperone present.   Constitutional:       Appearance: Normal appearance.   Cardiovascular:      Pulses:           Dorsalis pedis pulses are 1+ on the right side and 1+ on the left side.        Posterior tibial pulses are 0 on the right side and 0 on the left side.   Pulmonary:      Effort: Pulmonary effort is normal.   Musculoskeletal:         General: Tenderness and deformity present.   Feet:      Right foot:      Protective Sensation: 2 sites tested.  2 sites sensed.      Skin integrity: Erythema present.      Toenail Condition: Right toenails are abnormally thick, long and ingrown. Fungal disease present.     Left foot:      Protective Sensation: 2 sites tested.  2 sites " sensed.      Skin integrity: Erythema present.      Toenail Condition: Left toenails are abnormally thick, long and ingrown. Fungal disease present.  Skin:     General: Skin is warm.      Capillary Refill: Capillary refill takes more than 3 seconds.      Findings: Erythema present.   Neurological:      General: No focal deficit present.      Mental Status: She is alert.   Psychiatric:         Mood and Affect: Mood normal.         Behavior: Behavior normal.                                              Assessment:       1. Ingrown nail    2. Ulcer of both feet, limited to breakdown of skin    3. Hammer toes of both feet          Plan:       Patient presents today complaining about painfully ingrown toenails on both feet she also has severe hammertoes her toes are rubbing together and causing irritation patient was treated for breast cancer in May of this year.  Patient also states she has some new calluses that have formed from some new shoes that were too tight and rubbing her toes.Patient's family member states that they had no idea that the patient's toenails were so long and so ingrown.   Patient had severely elongated ingrowing toenails on multiple digits bilateral while the patient did not need a nail avulsion I was able to aggressively trim and remove the ingrowing toenails which the patient tolerated well patient noted to me to be immediate relief upon removing the ingrown toenail this was especially noticeable on the bilateral hallux.  Patient does have irritation 5th digit left and needs to make sure she is wearing shoes that accommodate for the digital contracture this irritation could certainly lead to breakdown ulceration which can lead to complication with the patient's peripheral vascular disease.  Patient had several new areas of pre ulcerative callus formation the most significant was overlying the lateral aspect of the 5th metatarsal phalangeal joint right I did debride this area there is a little  bit of a sore underlying this area and I advised the patient this needs to be monitored closely but should resolve as long as no further irritation presents over the area patient also has hyperkeratotic lesion sub 5th metatarsal left all of this is related to the patient's new shoes that were too narrow rubbing and irritating these areas she states she is no longer wearing the shoes and now that she is not wearing the shoes these areas are not as tender as they had been each of these areas were non excisionally debrided.  Recommended follow-up will be as needed if the patient is not getting complete relief of the ingrowing toenails in the next 4-5 days to contact us for follow-up further evaluation treatment but I do recommend keeping the patient's skin well moisturized well hydrated on a daily basis.  Total time including discussion evaluation treatment discussion of treatment options treatment plan removal of ingrown toenails as well as comprehensive  follow-uppatient exam and documentation equaled 20 minutes.This note was created using [employer] voice recognition software that occasionally misinterpreted phrases or words.

## 2024-09-17 ENCOUNTER — OFFICE VISIT (OUTPATIENT)
Facility: CLINIC | Age: 84
End: 2024-09-17
Payer: MEDICARE

## 2024-09-17 VITALS
WEIGHT: 207.38 LBS | HEIGHT: 66 IN | TEMPERATURE: 98 F | SYSTOLIC BLOOD PRESSURE: 136 MMHG | DIASTOLIC BLOOD PRESSURE: 70 MMHG | BODY MASS INDEX: 33.33 KG/M2 | HEART RATE: 69 BPM | RESPIRATION RATE: 18 BRPM

## 2024-09-17 DIAGNOSIS — C50.919 TRIPLE NEGATIVE MALIGNANT NEOPLASM OF BREAST: ICD-10-CM

## 2024-09-17 DIAGNOSIS — C50.112 MALIGNANT NEOPLASM OF CENTRAL PORTION OF LEFT BREAST IN FEMALE, ESTROGEN RECEPTOR NEGATIVE: ICD-10-CM

## 2024-09-17 DIAGNOSIS — D53.9 NUTRITIONAL ANEMIA: ICD-10-CM

## 2024-09-17 DIAGNOSIS — G62.0 DRUG-INDUCED POLYNEUROPATHY: Primary | ICD-10-CM

## 2024-09-17 DIAGNOSIS — Z17.1 MALIGNANT NEOPLASM OF CENTRAL PORTION OF LEFT BREAST IN FEMALE, ESTROGEN RECEPTOR NEGATIVE: ICD-10-CM

## 2024-09-17 PROCEDURE — 99999 PR PBB SHADOW E&M-EST. PATIENT-LVL III: CPT | Mod: PBBFAC,,, | Performed by: INTERNAL MEDICINE

## 2024-09-20 NOTE — PROGRESS NOTES
[facility] [address] Hematology Oncology Subsequent Encounter Note    9/17/24    Subjective:      Patient ID:   Rosa Aceves  83 y.o. female  1940  Elliot Forrest LeBlanc      Chief Complaint:   L breast cancer.    HPI:  83 y.o. female had R breast cancer in 1996, treated with R MRM at [facility] in [place], [place].  She had adjuvant chemotherapy, no adjuvant Rad Rx.  Not clear, if she had adjuvant Tamoxifen.    Now with abnormal L mammogram.  1.5 cm mass 12 o'clock posteriorly at L breast.  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 5/2022.  Grade 3.  Triple negative for ERP, PRP, H2N.    She had L mastectomy, Sentinal node bx 7 weeks ago.  LN negative.  0/1 positive.  She is triple negative.  Check PET.  7/18/22 ANDERSON    RR estimated 25- 30% at 4-5 years out with observation.  Adjuvant chemo with carboplatin, taxotere q 3 weeks x's 4-6 cycles,  udenyca support, gives RR 15% expected.  Can defer adriamycin administration.    Discussed treatment schema, potential benefit in reducing recurrence risk,  Potential side effects including hair loss, N/V/D, peripheral neuropathy, pancytopenia.  And other side effects.  Other regimens include adriamycin, but she had this 25 years ago and tolerated it with difficulty.    Peripheral veins poor, port placement per Dr. Renzo Bullard.    Tamoxifen, Arimidex not helpful here given negative ERP and PRP status.  Herceptin not helpful here given negative H@N status.  Triple negative dx.    D1C1  8/10/22.    Check BRCA 1 & 2 on her.  Triple negative, Bilateral breast cancer.    Hgb 12.7 to 10, creatinine 1.4.  GFR 43%.  S/P first cycle of chemo.  She had much toxicity.  She does not agree to go forward with #2.    Abnormal cologuard, she need colonoscopy, when she is stronger.  She has Amydesis HHA.    She does not agree to resume further chemotherapy for her cancer condition.  She is stronger however and walking around the house with the assistance of her walker.   Appetite is improved and her weight has gone from 171-179 lb.      Will discontinue the chemotherapy from her chart.  Off chemotherapy, she did not tolerate the Rx.  We are going with observation.  RR 30% expected.    Energy is 7-8/10 now.    C/O pain at R anterior leg just below the knee.  DJD hx.  Add trial of lodine 300 mg 1 daily.  Lodine only helps for 2-3 hours.  She wants to go back to her naprosyn 750 mg daily.    Dx 5/26/22, now 1 1/2 year out since Dx.  S/P bilateral mastectomy.  PF q 6 weeks.  Due 10/4/23.  Daughter takes celexa 10 mg prn HF sx.    PF yesterday.  Cologard test negative.  K 5.1, hold KCL pill.    She is taking meclizine for vertigo symptoms.  Original breast cancer on the right was diagnosed 28 years ago and managed per Dr. Cevallos at [facility] in [place].  Most recent breast cancer was on the left breast.  She does see Dr. Ruiz of GI.  She did receive 1 cycle of carboplatin Taxol, she told me that she felt if she took it again, it would kill her.  So we went with observation.    Her granddaughter is Margaret Haque, being seen at [facility] on October 31, 2024.    She has neuropathy symptoms, will check B12, folate, B6.  She will follow-up here in 1 month can cancel and 6 months for sure.    ROS:   GEN: normal without any fever, night sweats or weight loss  HEENT: normal with no HA's, sore throat, stiff neck, changes in vision  CV: normal with no CP, SOB, PND, FIGUEREDO or orthopnea  PULM: normal with no SOB, cough, hemoptysis, sputum or pleuritic pain  GI: normal with no abdominal pain, nausea, vomiting, constipation, diarrhea, melanotic stools, BRBPR, or hematemesis  : normal with no hematuria, dysuria  BREAST: See HPI  SKIN: normal with no rash, erythema, bruising, or swelling     Past Medical History:   Diagnosis Date    Aortic valve regurgitation     Benign paroxysmal vertigo, bilateral     Chronic kidney disease, unspecified     Coronary artery disease     Essential  (primary) hypertension     Malignant neoplasm of right breast     Mitral valve regurgitation     Obesity, unspecified     Osteopenia     Positive colorectal cancer screening using Cologuard test     Rheumatoid arthritis, unspecified     Ruptured lumbar disc     Unspecified cataract      Past Surgical History:   Procedure Laterality Date    BREAST BIOPSY Left     BREAST SURGERY Left     CHOLECYSTECTOMY      EYE SURGERY      INJECTION FOR SENTINEL NODE IDENTIFICATION Left 5/26/2022    Procedure: INJECTION, FOR SENTINEL NODE IDENTIFICATION;  Surgeon: Renzo Jim MD;  Location: [facility] OR;  Service: General;  Laterality: Left;    INSERTION OF TUNNELED CENTRAL VENOUS CATHETER (CVC) WITH SUBCUTANEOUS PORT N/A 8/1/2022    Procedure: [id number];  Surgeon: Renzo Jim MD;  Location: [facility] OR;  Service: General;  Laterality: N/A;    MASTECTOMY Left 5/26/2022    Procedure: MASTECTOMY;  Surgeon: Renzo Jim MD;  Location: [facility] OR;  Service: General;  Laterality: Left;    MASTECTOMY, RADICAL Right     SENTINEL LYMPH NODE BIOPSY Left 5/26/2022    Procedure: BIOPSY, LYMPH NODE, SENTINEL;  Surgeon: Renzo Jim MD;  Location: [facility] OR;  Service: General;  Laterality: Left;    TUBAL LIGATION         Review of patient's allergies indicates:  No Known Allergies  Social History     Socioeconomic History    Marital status:     Number of children: 5   Tobacco Use    Smoking status: Never    Smokeless tobacco: Never   Substance and Sexual Activity    Alcohol use: Never    Drug use: Never    Sexual activity: Not Currently     Partners: Male         Current Outpatient Medications:     acetaminophen (TYLENOL) 500 MG tablet, Take 500 mg by mouth every 6 (six) hours as needed., Disp: , Rfl:     hydrALAZINE (APRESOLINE) 50 MG tablet, Take 50 mg by mouth 2 (two) times daily., Disp: , Rfl:     lisinopriL (PRINIVIL,ZESTRIL) 40 MG tablet, Take 40 mg by mouth., Disp: , Rfl:     NIFEdipine (ADALAT CC) 90 MG  "TbSR, Take 90 mg by mouth., Disp: , Rfl:   No current facility-administered medications for this visit.    Facility-Administered Medications Ordered in Other Visits:     electrolyte-S (ISOLYTE), , Intravenous, Continuous, Irwin Russo MD    fentaNYL 50 mcg/mL injection 25 mcg, 25 mcg, Intravenous, Q5 Min PRN, Irwin Russo MD    HYDROmorphone (PF) injection 0.2 mg, 0.2 mg, Intravenous, Q5 Min PRN, Irwin Russo MD    lactated ringers infusion, , Intravenous, Continuous, Irwin Russo MD, Last Rate: 10 mL/hr at 08/01/22 1120, Restarted at 08/01/22 1234    LIDOcaine (PF) 10 mg/ml (1%) injection 10 mg, 1 mL, Intradermal, Once, Irwin Russo MD    oxyCODONE immediate release tablet 5 mg, 5 mg, Oral, Q3H PRN, Irwin Russo MD          Objective:   Vitals:  Blood pressure 130/61, pulse 72, temperature 97.2 °F (36.2 °C), resp. rate 16, height 5' 5" (1.651 m), weight 90.8 kg (200 lb 1.6 oz).    Physical Examination:   GEN: no apparent distress, comfortable  HEAD: atraumatic and normocephalic  EYES: no pallor, no icterus  ENT:  no pharyngeal erythema, external ears WNL; no nasal discharge  NECK: no masses, thyroid normal, trachea midline, no LAD/LN's, supple  CV: RRR with no murmur; normal pulse; normal S1 and S2; no pedal edema  CHEST: Normal respiratory effort; CTAB; normal breath sounds; no wheeze or crackles  ABDOM: nontender and nondistended; soft;  no rebound/guarding, L/S NP  MUSC/Skeletal: ROM normal; no crepitus; joints normal  EXTREM: no clubbing, cyanosis, inflammation or swelling  SKIN: no rashes, lesions, ulcers, petechiae   : no cvat  NEURO: grossly intact; motor/sensory WNL;  no tremors  PSYCH: normal mood, affect and behavior  LYMPH: normal cervical, supraclavicular, axillary and LN's  BREAST: L & R chest wall NT, no palpable mass.  She has decreased ROM at R shoulder and arm.    CBC, CMP, TSH unremarkable  B 12, folate, ferritin ordered.  Assessment:   (1) 83 y.o. female with diagnosis " "of invasive ductal Ca with lobular features at 12 o'clock at L breast.  1.5 cm, Triple negative for ERP, PRP, H2N.  S/P L mastectomy Primary 1.5 cm, LN negative.    (2)Hx of R breast cancer 25 years ago.  R MRM, adjuvant chemotherapy,adjuvant tamoxifen?    (3)Discussed neoadjuvant chemotherapy with Carbo, taxotere or CTX, taxotere, or AC q 3 weeks x's 4-6 cycles.  Followed by L mastectomy and SNBx and adjuvant Rad Rx.  Or L mastectomy, SNBx and no Rad Rx necessary.    (3)Because of age, above medical problems, frail appearance; I am hesitant to go with neoadjuvant chemo initially,  She may not have the stamina for later surgery.    (4)I would go with surgery initially,and then after she recovers, will reconsider her later for adjuvant Rx.?    (5)Check PET for staging, given triple negative dx, R shoulder sx.  PET ANDERSON.    (6)S/P L mastectomy, SNBx. 5/26/22    Adjuvant Carbo, Taxotere q 3 weeks x's 4-6 cycles.  Udenyca support.  D1C1 8/10/22.    5'6"   177#    Undue toxicity.  S/P 1 cycle of chemo.  She does not agree to chemo #2.  She is doing better at this time and strengthening.      (7)Scott Hicks.    PF at , lab to be drawn from port. 3/2024    Genetic Testing MUTYH +, significant for increased risk of colorectal cancer.  BRCA 1 & 2 were negative.    RTC 1 month, can cancel if labs are ok.  RTC 6 months.                                "

## 2024-10-17 ENCOUNTER — TELEPHONE (OUTPATIENT)
Facility: CLINIC | Age: 84
End: 2024-10-17
Payer: MEDICARE

## 2024-10-17 RX ORDER — SODIUM CHLORIDE 0.9 % (FLUSH) 0.9 %
10 SYRINGE (ML) INJECTION
OUTPATIENT
Start: 2024-10-17

## 2024-10-17 RX ORDER — HEPARIN 100 UNIT/ML
500 SYRINGE INTRAVENOUS
OUTPATIENT
Start: 2024-10-17

## 2024-10-17 NOTE — TELEPHONE ENCOUNTER
----- Message from Christy sent at 10/17/2024  8:49 AM CDT -----  Katharine Francois called, she is needing new orders for port flush. They  on 10/04 and the pt is coming in at 10:00 this morning    Cb [phone]    Fax [phone]

## 2024-10-22 ENCOUNTER — OFFICE VISIT (OUTPATIENT)
Dept: PODIATRY | Facility: CLINIC | Age: 84
End: 2024-10-22
Payer: MEDICARE

## 2024-10-22 VITALS
DIASTOLIC BLOOD PRESSURE: 82 MMHG | BODY MASS INDEX: 33.34 KG/M2 | SYSTOLIC BLOOD PRESSURE: 169 MMHG | HEART RATE: 81 BPM | HEIGHT: 66 IN | WEIGHT: 207.44 LBS

## 2024-10-22 DIAGNOSIS — L97.511 ULCER OF BOTH FEET, LIMITED TO BREAKDOWN OF SKIN: ICD-10-CM

## 2024-10-22 DIAGNOSIS — M20.41 HAMMER TOES OF BOTH FEET: ICD-10-CM

## 2024-10-22 DIAGNOSIS — L60.0 INGROWN NAIL: Primary | ICD-10-CM

## 2024-10-22 DIAGNOSIS — M20.42 HAMMER TOES OF BOTH FEET: ICD-10-CM

## 2024-10-22 DIAGNOSIS — L97.521 ULCER OF BOTH FEET, LIMITED TO BREAKDOWN OF SKIN: ICD-10-CM

## 2024-10-22 PROCEDURE — 3288F FALL RISK ASSESSMENT DOCD: CPT | Mod: CPTII,S$GLB,, | Performed by: PODIATRIST

## 2024-10-22 PROCEDURE — 1101F PT FALLS ASSESS-DOCD LE1/YR: CPT | Mod: CPTII,S$GLB,, | Performed by: PODIATRIST

## 2024-10-22 PROCEDURE — 1159F MED LIST DOCD IN RCRD: CPT | Mod: CPTII,S$GLB,, | Performed by: PODIATRIST

## 2024-10-22 PROCEDURE — 1126F AMNT PAIN NOTED NONE PRSNT: CPT | Mod: CPTII,S$GLB,, | Performed by: PODIATRIST

## 2024-10-22 PROCEDURE — 1160F RVW MEDS BY RX/DR IN RCRD: CPT | Mod: CPTII,S$GLB,, | Performed by: PODIATRIST

## 2024-10-22 PROCEDURE — 3077F SYST BP >= 140 MM HG: CPT | Mod: CPTII,S$GLB,, | Performed by: PODIATRIST

## 2024-10-22 PROCEDURE — 99999 PR PBB SHADOW E&M-EST. PATIENT-LVL III: CPT | Mod: PBBFAC,,, | Performed by: PODIATRIST

## 2024-10-22 PROCEDURE — 99213 OFFICE O/P EST LOW 20 MIN: CPT | Mod: S$GLB,,, | Performed by: PODIATRIST

## 2024-10-22 PROCEDURE — 3079F DIAST BP 80-89 MM HG: CPT | Mod: CPTII,S$GLB,, | Performed by: PODIATRIST

## 2024-10-22 RX ORDER — FUROSEMIDE 40 MG/1
1 TABLET ORAL DAILY PRN
COMMUNITY
Start: 2024-10-09

## 2024-10-31 ENCOUNTER — OFFICE VISIT (OUTPATIENT)
Facility: CLINIC | Age: 84
End: 2024-10-31
Payer: MEDICARE

## 2024-10-31 VITALS
DIASTOLIC BLOOD PRESSURE: 74 MMHG | HEIGHT: 66 IN | RESPIRATION RATE: 18 BRPM | TEMPERATURE: 98 F | BODY MASS INDEX: 32.44 KG/M2 | WEIGHT: 201.88 LBS | SYSTOLIC BLOOD PRESSURE: 132 MMHG | HEART RATE: 101 BPM

## 2024-10-31 DIAGNOSIS — Z17.421 TRIPLE NEGATIVE MALIGNANT NEOPLASM OF BREAST: ICD-10-CM

## 2024-10-31 DIAGNOSIS — G62.0 DRUG-INDUCED POLYNEUROPATHY: ICD-10-CM

## 2024-10-31 DIAGNOSIS — C50.112 MALIGNANT NEOPLASM OF CENTRAL PORTION OF LEFT BREAST IN FEMALE, ESTROGEN RECEPTOR NEGATIVE: Primary | ICD-10-CM

## 2024-10-31 DIAGNOSIS — C50.919 TRIPLE NEGATIVE MALIGNANT NEOPLASM OF BREAST: ICD-10-CM

## 2024-10-31 DIAGNOSIS — D53.9 NUTRITIONAL ANEMIA: ICD-10-CM

## 2024-10-31 DIAGNOSIS — Z17.1 MALIGNANT NEOPLASM OF CENTRAL PORTION OF LEFT BREAST IN FEMALE, ESTROGEN RECEPTOR NEGATIVE: Primary | ICD-10-CM

## 2024-10-31 PROCEDURE — 1159F MED LIST DOCD IN RCRD: CPT | Mod: CPTII,S$GLB,, | Performed by: INTERNAL MEDICINE

## 2024-10-31 PROCEDURE — 3075F SYST BP GE 130 - 139MM HG: CPT | Mod: CPTII,S$GLB,, | Performed by: INTERNAL MEDICINE

## 2024-10-31 PROCEDURE — 99999 PR PBB SHADOW E&M-EST. PATIENT-LVL III: CPT | Mod: PBBFAC,,, | Performed by: INTERNAL MEDICINE

## 2024-10-31 PROCEDURE — 1101F PT FALLS ASSESS-DOCD LE1/YR: CPT | Mod: CPTII,S$GLB,, | Performed by: INTERNAL MEDICINE

## 2024-10-31 PROCEDURE — G2211 COMPLEX E/M VISIT ADD ON: HCPCS | Mod: S$GLB,,, | Performed by: INTERNAL MEDICINE

## 2024-10-31 PROCEDURE — 1126F AMNT PAIN NOTED NONE PRSNT: CPT | Mod: CPTII,S$GLB,, | Performed by: INTERNAL MEDICINE

## 2024-10-31 PROCEDURE — 3288F FALL RISK ASSESSMENT DOCD: CPT | Mod: CPTII,S$GLB,, | Performed by: INTERNAL MEDICINE

## 2024-10-31 PROCEDURE — 99215 OFFICE O/P EST HI 40 MIN: CPT | Mod: S$GLB,,, | Performed by: INTERNAL MEDICINE

## 2024-10-31 PROCEDURE — 3078F DIAST BP <80 MM HG: CPT | Mod: CPTII,S$GLB,, | Performed by: INTERNAL MEDICINE

## 2024-11-04 NOTE — PROGRESS NOTES
[facility] [address] Hematology Oncology Subsequent Encounter Note    10/31/24    Subjective:      Patient ID:   Rosa Aceves  83 y.o. female  1940  Elliot Jj LeBlanc      Chief Complaint:   L breast cancer.    HPI:  83 y.o. female had R breast cancer in 1996, treated with R MRM at [facility] in [place], [place].  She had adjuvant chemotherapy, no adjuvant Rad Rx.  Not clear, if she had adjuvant Tamoxifen.    Now with abnormal L mammogram.  1.5 cm mass 12 o'clock posteriorly at L breast.  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 5/2022.  Grade 3.  Triple negative for ERP, PRP, H2N.    She had L mastectomy, Sentinal node bx 7 weeks ago.  LN negative.  0/1 positive.  She is triple negative.  Check PET.  7/18/22 ANDERSON    RR estimated 25- 30% at 4-5 years out with observation.  Adjuvant chemo with carboplatin, taxotere q 3 weeks x's 4-6 cycles,  udenyca support, gives RR 15% expected.  Can defer adriamycin administration.    Discussed treatment schema, potential benefit in reducing recurrence risk,  Potential side effects including hair loss, N/V/D, peripheral neuropathy, pancytopenia.  And other side effects.  Other regimens include adriamycin, but she had this 25 years ago and tolerated it with difficulty.    Peripheral veins poor, port placement per Dr. Rezno Bullard.    Tamoxifen, Arimidex not helpful here given negative ERP and PRP status.  Herceptin not helpful here given negative H@N status.  Triple negative dx.    D1C1  8/10/22.    Check BRCA 1 & 2 on her.  Triple negative, Bilateral breast cancer.    Hgb 12.7 to 10, creatinine 1.4.  GFR 43%.  S/P first cycle of chemo.  She had much toxicity.  She does not agree to go forward with #2.    Abnormal cologuard, she need colonoscopy, when she is stronger.  She has Amydesis HHA.    She does not agree to resume further chemotherapy for her cancer condition.  She is stronger however and walking around the house with the assistance of her walker.   Appetite is improved and her weight has gone from 171-179 lb.      Will discontinue the chemotherapy from her chart.  Off chemotherapy, she did not tolerate the Rx.  We are going with observation.  RR 30% expected.    Energy is 7-8/10 now.    C/O pain at R anterior leg just below the knee.  DJD hx.  Add trial of lodine 300 mg 1 daily.  Lodine only helps for 2-3 hours.  She wants to go back to her naprosyn 750 mg daily.    Dx 5/26/22, now 1 1/2 year out since Dx.  S/P bilateral mastectomy.  PF q 6 weeks.  Due 10/4/23.  Daughter takes celexa 10 mg prn HF sx.    PF yesterday.  Cologard test negative.  K 5.1, hold KCL pill.    She is taking meclizine for vertigo symptoms.  Original breast cancer on the right was diagnosed 28 years ago and managed per Dr. Cevallos at [facility] in [place].  Most recent breast cancer was on the left breast.  She does see Dr. Ruiz of GI.  She did receive 1 cycle of carboplatin Taxol, she told me that she felt if she took it again, it would kill her.  So we went with observation.    Her granddaughter is Margaret Haque, being seen at [facility] on October 31, 2024.    She has neuropathy symptoms, will check B12, folate, B6.  She will follow-up here in 1 month can cancel and 6 months for sure.    B 12 329, try to maintain > 400.  Begin B 12 subl daily.  PN L fingers, R toes.  She wants to remove port, Dr. Bullard.    ROS:   GEN: normal without any fever, night sweats or weight loss  HEENT: normal with no HA's, sore throat, stiff neck, changes in vision  CV: normal with no CP, SOB, PND, FIGUEREDO or orthopnea  PULM: normal with no SOB, cough, hemoptysis, sputum or pleuritic pain  GI: normal with no abdominal pain, nausea, vomiting, constipation, diarrhea, melanotic stools, BRBPR, or hematemesis  : normal with no hematuria, dysuria  BREAST: See HPI  SKIN: normal with no rash, erythema, bruising, or swelling     Past Medical History:   Diagnosis Date    Aortic valve regurgitation      Benign paroxysmal vertigo, bilateral     Chronic kidney disease, unspecified     Coronary artery disease     Essential (primary) hypertension     Malignant neoplasm of right breast     Mitral valve regurgitation     Obesity, unspecified     Osteopenia     Positive colorectal cancer screening using Cologuard test     Rheumatoid arthritis, unspecified     Ruptured lumbar disc     Unspecified cataract      Past Surgical History:   Procedure Laterality Date    BREAST BIOPSY Left     BREAST SURGERY Left     CHOLECYSTECTOMY      EYE SURGERY      INJECTION FOR SENTINEL NODE IDENTIFICATION Left 5/26/2022    Procedure: INJECTION, FOR SENTINEL NODE IDENTIFICATION;  Surgeon: Renzo Jim MD;  Location: [facility] OR;  Service: General;  Laterality: Left;    INSERTION OF TUNNELED CENTRAL VENOUS CATHETER (CVC) WITH SUBCUTANEOUS PORT N/A 8/1/2022    Procedure: [id number];  Surgeon: Renzo Jim MD;  Location: [facility] OR;  Service: General;  Laterality: N/A;    MASTECTOMY Left 5/26/2022    Procedure: MASTECTOMY;  Surgeon: Renzo Jim MD;  Location: [facility] OR;  Service: General;  Laterality: Left;    MASTECTOMY, RADICAL Right     SENTINEL LYMPH NODE BIOPSY Left 5/26/2022    Procedure: BIOPSY, LYMPH NODE, SENTINEL;  Surgeon: Renzo Jim MD;  Location: [facility] OR;  Service: General;  Laterality: Left;    TUBAL LIGATION         Review of patient's allergies indicates:  No Known Allergies  Social History     Socioeconomic History    Marital status:     Number of children: 5   Tobacco Use    Smoking status: Never    Smokeless tobacco: Never   Substance and Sexual Activity    Alcohol use: Never    Drug use: Never    Sexual activity: Not Currently     Partners: Male         Current Outpatient Medications:     acetaminophen (TYLENOL) 500 MG tablet, Take 500 mg by mouth every 6 (six) hours as needed., Disp: , Rfl:     hydrALAZINE (APRESOLINE) 50 MG tablet, Take 50 mg by mouth 2 (two) times daily., Disp: ,  "Rfl:     lisinopriL (PRINIVIL,ZESTRIL) 40 MG tablet, Take 40 mg by mouth., Disp: , Rfl:     NIFEdipine (ADALAT CC) 90 MG TbSR, Take 90 mg by mouth., Disp: , Rfl:   No current facility-administered medications for this visit.    Facility-Administered Medications Ordered in Other Visits:     electrolyte-S (ISOLYTE), , Intravenous, Continuous, Irwin Russo MD    fentaNYL 50 mcg/mL injection 25 mcg, 25 mcg, Intravenous, Q5 Min PRN, Irwin Russo MD    HYDROmorphone (PF) injection 0.2 mg, 0.2 mg, Intravenous, Q5 Min PRN, Irwin Russo MD    lactated ringers infusion, , Intravenous, Continuous, Irwin Russo MD, Last Rate: 10 mL/hr at 08/01/22 1120, Restarted at 08/01/22 1234    LIDOcaine (PF) 10 mg/ml (1%) injection 10 mg, 1 mL, Intradermal, Once, Irwin Russo MD    oxyCODONE immediate release tablet 5 mg, 5 mg, Oral, Q3H PRN, Irwin Russo MD          Objective:   Vitals:  Blood pressure 130/61, pulse 72, temperature 97.2 °F (36.2 °C), resp. rate 16, height 5' 5" (1.651 m), weight 90.8 kg (200 lb 1.6 oz).    Physical Examination:   GEN: no apparent distress, comfortable  HEAD: atraumatic and normocephalic  EYES: no pallor, no icterus  ENT:  no pharyngeal erythema, external ears WNL; no nasal discharge  NECK: no masses, thyroid normal, trachea midline, no LAD/LN's, supple  CV: RRR with no murmur; normal pulse; normal S1 and S2; no pedal edema  CHEST: Normal respiratory effort; CTAB; normal breath sounds; no wheeze or crackles  ABDOM: nontender and nondistended; soft;  no rebound/guarding, L/S NP  MUSC/Skeletal: ROM normal; no crepitus; joints normal  EXTREM: no clubbing, cyanosis, inflammation or swelling  SKIN: no rashes, lesions, ulcers, petechiae   : no cvat  NEURO: grossly intact; motor/sensory WNL;  no tremors  PSYCH: normal mood, affect and behavior  LYMPH: normal cervical, supraclavicular, axillary and LN's  BREAST: L & R chest wall NT, no palpable mass.  She has decreased ROM at R shoulder " "and arm.    CBC, CMP, TSH unremarkable  B 12, folate, ferritin ordered.  Assessment:   (1) 83 y.o. female with diagnosis of invasive ductal Ca with lobular features at 12 o'clock at L breast.  1.5 cm, Triple negative for ERP, PRP, H2N.  S/P L mastectomy Primary 1.5 cm, LN negative.    (2)Hx of R breast cancer 25 years ago.  R MRM, adjuvant chemotherapy,adjuvant tamoxifen?    (3)Discussed neoadjuvant chemotherapy with Carbo, taxotere or CTX, taxotere, or AC q 3 weeks x's 4-6 cycles.  Followed by L mastectomy and SNBx and adjuvant Rad Rx.  Or L mastectomy, SNBx and no Rad Rx necessary.    (3)Because of age, above medical problems, frail appearance; I am hesitant to go with neoadjuvant chemo initially,  She may not have the stamina for later surgery.    (4)I would go with surgery initially,and then after she recovers, will reconsider her later for adjuvant Rx.?    (5)Check PET for staging, given triple negative dx, R shoulder sx.  PET ANDERSON.    (6)S/P L mastectomy, SNBx. 5/26/22    Adjuvant Carbo, Taxotere q 3 weeks x's 4-6 cycles.  Udenyca support.  D1C1 8/10/22.    5'6"   177#    Undue toxicity.  S/P 1 cycle of chemo.  She does not agree to chemo #2.  She is doing better at this time and strengthening.      (7)DJD R anna, Scott prn.    PF at , lab to be drawn from port. 3/2024    Genetic Testing MUTYH +, significant for increased risk of colorectal cancer.  BRCA 1 & 2 were negative.    B12 329, begin B 12 subl daily.    Refer to Dr. Bullard for port removal.  RTC 6 months.                                  "

## 2024-12-12 ENCOUNTER — OFFICE VISIT (OUTPATIENT)
Dept: PODIATRY | Facility: CLINIC | Age: 84
End: 2024-12-12
Payer: MEDICARE

## 2024-12-12 VITALS
WEIGHT: 201.94 LBS | DIASTOLIC BLOOD PRESSURE: 77 MMHG | HEART RATE: 81 BPM | BODY MASS INDEX: 32.45 KG/M2 | HEIGHT: 66 IN | SYSTOLIC BLOOD PRESSURE: 153 MMHG

## 2024-12-12 DIAGNOSIS — M20.41 HAMMER TOES OF BOTH FEET: ICD-10-CM

## 2024-12-12 DIAGNOSIS — L97.511 ULCER OF BOTH FEET, LIMITED TO BREAKDOWN OF SKIN: ICD-10-CM

## 2024-12-12 DIAGNOSIS — L97.521 ULCER OF BOTH FEET, LIMITED TO BREAKDOWN OF SKIN: ICD-10-CM

## 2024-12-12 DIAGNOSIS — L60.0 INGROWN NAIL: Primary | ICD-10-CM

## 2024-12-12 DIAGNOSIS — M20.42 HAMMER TOES OF BOTH FEET: ICD-10-CM

## 2024-12-12 PROCEDURE — 99999 PR PBB SHADOW E&M-EST. PATIENT-LVL III: CPT | Mod: PBBFAC,,, | Performed by: PODIATRIST

## 2024-12-12 PROCEDURE — 1160F RVW MEDS BY RX/DR IN RCRD: CPT | Mod: CPTII,S$GLB,, | Performed by: PODIATRIST

## 2024-12-12 PROCEDURE — 1159F MED LIST DOCD IN RCRD: CPT | Mod: CPTII,S$GLB,, | Performed by: PODIATRIST

## 2024-12-12 PROCEDURE — 3288F FALL RISK ASSESSMENT DOCD: CPT | Mod: CPTII,S$GLB,, | Performed by: PODIATRIST

## 2024-12-12 PROCEDURE — 1101F PT FALLS ASSESS-DOCD LE1/YR: CPT | Mod: CPTII,S$GLB,, | Performed by: PODIATRIST

## 2024-12-12 PROCEDURE — 3077F SYST BP >= 140 MM HG: CPT | Mod: CPTII,S$GLB,, | Performed by: PODIATRIST

## 2024-12-12 PROCEDURE — 99213 OFFICE O/P EST LOW 20 MIN: CPT | Mod: S$GLB,,, | Performed by: PODIATRIST

## 2024-12-12 PROCEDURE — 1126F AMNT PAIN NOTED NONE PRSNT: CPT | Mod: CPTII,S$GLB,, | Performed by: PODIATRIST

## 2024-12-12 PROCEDURE — 3078F DIAST BP <80 MM HG: CPT | Mod: CPTII,S$GLB,, | Performed by: PODIATRIST

## 2024-12-15 NOTE — PROGRESS NOTES
Subjective:       Patient ID: Rosa Aceves is a 83 y.o. female.    Chief Complaint: Ingrown Toenail  Patient presents today she is complaining about painfully ingrown toenails on both feet she also has severe hammertoes her toes are rubbing together and causing irritation patient was treated for breast cancer in May of this year.  Patient also states she has some new calluses that have formed from some new shoes that were too tight and rubbing her toes.    Past Medical History:   Diagnosis Date    Aortic valve regurgitation     Benign paroxysmal vertigo, bilateral     Chronic kidney disease, unspecified     Coronary artery disease     Essential (primary) hypertension     Malignant neoplasm of right breast     Mitral valve regurgitation     Obesity, unspecified     Osteopenia     Positive colorectal cancer screening using Cologuard test     Rheumatoid arthritis, unspecified     Ruptured lumbar disc     Unspecified cataract      Past Surgical History:   Procedure Laterality Date    BREAST BIOPSY Left     BREAST SURGERY Left     CHOLECYSTECTOMY      EYE SURGERY      INJECTION FOR SENTINEL NODE IDENTIFICATION Left 5/26/2022    Procedure: INJECTION, FOR SENTINEL NODE IDENTIFICATION;  Surgeon: Renzo Jim MD;  Location: [facility] OR;  Service: General;  Laterality: Left;    INSERTION OF TUNNELED CENTRAL VENOUS CATHETER (CVC) WITH SUBCUTANEOUS PORT N/A 8/1/2022    Procedure: [id number];  Surgeon: Renzo Jim MD;  Location: [facility] OR;  Service: General;  Laterality: N/A;    MASTECTOMY Left 5/26/2022    Procedure: MASTECTOMY;  Surgeon: Renzo Jim MD;  Location: [facility] OR;  Service: General;  Laterality: Left;    MASTECTOMY, RADICAL Right     SENTINEL LYMPH NODE BIOPSY Left 5/26/2022    Procedure: BIOPSY, LYMPH NODE, SENTINEL;  Surgeon: Renzo Jim MD;  Location: [facility] OR;  Service: General;  Laterality: Left;    TUBAL LIGATION       Family History   Problem Relation Name Age of Onset     Cancer Mother      Heart failure Father      Cancer Sister      Cancer Grandchild       Social History     Socioeconomic History    Marital status:     Number of children: 5   Tobacco Use    Smoking status: Never    Smokeless tobacco: Never   Substance and Sexual Activity    Alcohol use: Never    Drug use: Never    Sexual activity: Not Currently     Partners: Male     Social Drivers of Health     Financial Resource Strain: Low Risk  (10/24/2024)    Overall Financial Resource Strain (CARDIA)     Difficulty of Paying Living Expenses: Not hard at all   Food Insecurity: No Food Insecurity (10/24/2024)    Hunger Vital Sign     Worried About Running Out of Food in the Last Year: Never true     Ran Out of Food in the Last Year: Never true   Transportation Needs: No Transportation Needs (4/18/2024)    [facility] and [facility]     Lack of Transportation (Medical): No     Lack of Transportation (Non-Medical): No   Physical Activity: Inactive (10/24/2024)    Exercise Vital Sign     Days of Exercise per Week: 0 days     Minutes of Exercise per Session: 0 min   Stress: No Stress Concern Present (10/24/2024)    [employer] - Occupational Stress Questionnaire     Feeling of Stress : Not at all   Housing Stability: Unknown (10/24/2024)    Housing Stability Vital Sign     Unable to Pay for Housing in the Last Year: No       Current Outpatient Medications   Medication Sig Dispense Refill    acetaminophen (TYLENOL) 500 MG tablet Take 500 mg by mouth every 6 (six) hours as needed.      diazePAM (VALIUM) 2 MG tablet Take 2 mg by mouth.      furosemide (LASIX) 40 MG tablet Take 1 tablet by mouth daily as needed.      hydrALAZINE (APRESOLINE) 50 MG tablet Take 50 mg by mouth 2 (two) times daily.      lisinopriL (PRINIVIL,ZESTRIL) 40 MG tablet Take 40 mg by mouth.      NIFEdipine (ADALAT CC) 90 MG TbSR Take 90 mg by mouth.       No current  "facility-administered medications for this visit.     Facility-Administered Medications Ordered in Other Visits   Medication Dose Route Frequency Provider Last Rate Last Admin    electrolyte-S (ISOLYTE)   Intravenous Continuous Irwin Russo MD        fentaNYL 50 mcg/mL injection 25 mcg  25 mcg Intravenous Q5 Min PRN Irwin Russo MD        HYDROmorphone (PF) injection 0.2 mg  0.2 mg Intravenous Q5 Min PRN Irwin Russo MD        lactated ringers infusion   Intravenous Continuous Irwin Russo MD 10 mL/hr at 08/01/22 1120 Restarted at 08/01/22 1234    LIDOcaine (PF) 10 mg/ml (1%) injection 10 mg  1 mL Intradermal Once Irwin Russo MD        oxyCODONE immediate release tablet 5 mg  5 mg Oral Q3H PRN Irwin Russo MD         Review of patient's allergies indicates:  No Known Allergies    Review of Systems   Musculoskeletal:  Positive for arthralgias.   All other systems reviewed and are negative.      Objective:      Vitals:    12/12/24 1036   BP: (!) 153/77   BP Location: Right arm   Patient Position: Sitting   Pulse: 81   Weight: 91.6 kg (201 lb 15.1 oz)   Height: 5' 5.5" (1.664 m)     Physical Exam  Vitals and nursing note reviewed. Exam conducted with a chaperone present.   Constitutional:       Appearance: Normal appearance.   Cardiovascular:      Pulses:           Dorsalis pedis pulses are 1+ on the right side and 1+ on the left side.        Posterior tibial pulses are 0 on the right side and 0 on the left side.   Pulmonary:      Effort: Pulmonary effort is normal.   Musculoskeletal:         General: Tenderness and deformity present.   Feet:      Right foot:      Protective Sensation: 2 sites tested.  2 sites sensed.      Skin integrity: Erythema present.      Toenail Condition: Right toenails are abnormally thick, long and ingrown. Fungal disease present.     Left foot:      Protective Sensation: 2 sites tested.  2 sites sensed.      Skin integrity: Erythema present.      Toenail Condition: " Left toenails are abnormally thick, long and ingrown. Fungal disease present.  Skin:     General: Skin is warm.      Capillary Refill: Capillary refill takes more than 3 seconds.      Findings: Erythema present.   Neurological:      General: No focal deficit present.      Mental Status: She is alert.   Psychiatric:         Mood and Affect: Mood normal.         Behavior: Behavior normal.                                                                                      Assessment:       1. Ingrown nail    2. Ulcer of both feet, limited to breakdown of skin    3. Hammer toes of both feet          Plan:       Patient presents today complaining about painfully ingrown toenails on both feet she also has severe hammertoes her toes are rubbing together and causing irritation patient was treated for breast cancer in May of this year.  Patient also states she has some new calluses that have formed from some new shoes that were too tight and rubbing her toes.Patient's family member states that they had no idea that the patient's toenails were so long and so ingrown.   Patient had severely elongated ingrowing toenails on multiple digits bilateral while the patient did not need a nail avulsion I was able to aggressively trim and remove the ingrowing toenails which the patient tolerated well patient noted to me to be immediate relief upon removing the ingrown toenail this was especially noticeable on the bilateral hallux.  Patient does have irritation 5th digit left and needs to make sure she is wearing shoes that accommodate for the digital contracture this irritation could certainly lead to breakdown ulceration which can lead to complication with the patient's peripheral vascular disease.  Patient had several new areas of pre ulcerative callus formation the most significant was overlying the lateral aspect of the 5th metatarsal phalangeal joint right I did debride this area there is a little bit of a sore underlying this  area and I advised the patient this needs to be monitored closely but should resolve as long as no further irritation presents over the area patient also has hyperkeratotic lesion sub 5th metatarsal left all of this is related to the patient's new shoes that were too narrow rubbing and irritating these areas she states she is no longer wearing the shoes and now that she is not wearing the shoes these areas are not as tender as they had been each of these areas were non excisionally debrided.  Recommended follow-up will be as needed if the patient is not getting complete relief of the ingrowing toenails in the next 4-5 days to contact us for follow-up further evaluation treatment but I do recommend keeping the patient's skin well moisturized well hydrated on a daily basis.  Total time including discussion evaluation treatment discussion of treatment options treatment plan removal of ingrown toenails as well as comprehensive  follow-uppatient exam and documentation equaled 20 minutes.This note was created using [employer] voice recognition software that occasionally misinterpreted phrases or words.

## 2024-12-23 ENCOUNTER — TELEPHONE (OUTPATIENT)
Dept: SURGERY | Facility: CLINIC | Age: 84
End: 2024-12-23
Payer: MEDICARE

## 2024-12-23 NOTE — TELEPHONE ENCOUNTER
Left messages for patient that I needed to reschedule her appointment with Dr Jim on 1-7-2025 as he will be out of the office.  I rescheduled the appointment and mailed a copy of the appointment slip to her mailing address.   actual

## 2025-01-28 ENCOUNTER — OFFICE VISIT (OUTPATIENT)
Dept: PODIATRY | Facility: CLINIC | Age: 85
End: 2025-01-28
Payer: MEDICARE

## 2025-01-28 VITALS
WEIGHT: 201.94 LBS | BODY MASS INDEX: 32.45 KG/M2 | SYSTOLIC BLOOD PRESSURE: 165 MMHG | HEART RATE: 69 BPM | DIASTOLIC BLOOD PRESSURE: 69 MMHG | HEIGHT: 66 IN

## 2025-01-28 DIAGNOSIS — L60.0 INGROWN NAIL: Primary | ICD-10-CM

## 2025-01-28 DIAGNOSIS — M20.42 HAMMER TOES OF BOTH FEET: ICD-10-CM

## 2025-01-28 DIAGNOSIS — M20.41 HAMMER TOES OF BOTH FEET: ICD-10-CM

## 2025-01-28 DIAGNOSIS — L97.511 ULCER OF BOTH FEET, LIMITED TO BREAKDOWN OF SKIN: ICD-10-CM

## 2025-01-28 DIAGNOSIS — L97.521 ULCER OF BOTH FEET, LIMITED TO BREAKDOWN OF SKIN: ICD-10-CM

## 2025-01-28 PROCEDURE — 1159F MED LIST DOCD IN RCRD: CPT | Mod: CPTII,S$GLB,, | Performed by: PODIATRIST

## 2025-01-28 PROCEDURE — 1126F AMNT PAIN NOTED NONE PRSNT: CPT | Mod: CPTII,S$GLB,, | Performed by: PODIATRIST

## 2025-01-28 PROCEDURE — 3077F SYST BP >= 140 MM HG: CPT | Mod: CPTII,S$GLB,, | Performed by: PODIATRIST

## 2025-01-28 PROCEDURE — 99999 PR PBB SHADOW E&M-EST. PATIENT-LVL III: CPT | Mod: PBBFAC,,, | Performed by: PODIATRIST

## 2025-01-28 PROCEDURE — 1160F RVW MEDS BY RX/DR IN RCRD: CPT | Mod: CPTII,S$GLB,, | Performed by: PODIATRIST

## 2025-01-28 PROCEDURE — 99213 OFFICE O/P EST LOW 20 MIN: CPT | Mod: S$GLB,,, | Performed by: PODIATRIST

## 2025-01-28 PROCEDURE — 1101F PT FALLS ASSESS-DOCD LE1/YR: CPT | Mod: CPTII,S$GLB,, | Performed by: PODIATRIST

## 2025-01-28 PROCEDURE — 3288F FALL RISK ASSESSMENT DOCD: CPT | Mod: CPTII,S$GLB,, | Performed by: PODIATRIST

## 2025-01-28 PROCEDURE — 3078F DIAST BP <80 MM HG: CPT | Mod: CPTII,S$GLB,, | Performed by: PODIATRIST

## 2025-02-01 NOTE — PROGRESS NOTES
Subjective:       Patient ID: Rosa Aceves is a 84 y.o. female.    Chief Complaint: Foot Swelling and Ingrown Toenail  Patient presents today she is complaining about painfully ingrown toenails on both feet she also has severe hammertoes her toes are rubbing together and causing irritation patient was treated for breast cancer in May of this year.  Patient also states she has some new calluses that have formed from some new shoes that were too tight and rubbing her toes.    Past Medical History:   Diagnosis Date    Aortic valve regurgitation     Benign paroxysmal vertigo, bilateral     Chronic kidney disease, unspecified     Coronary artery disease     Essential (primary) hypertension     Malignant neoplasm of right breast     Mitral valve regurgitation     Obesity, unspecified     Osteopenia     Positive colorectal cancer screening using Cologuard test     Rheumatoid arthritis, unspecified     Ruptured lumbar disc     Unspecified cataract      Past Surgical History:   Procedure Laterality Date    BREAST BIOPSY Left     BREAST SURGERY Left     CHOLECYSTECTOMY      EYE SURGERY      INJECTION FOR SENTINEL NODE IDENTIFICATION Left 5/26/2022    Procedure: INJECTION, FOR SENTINEL NODE IDENTIFICATION;  Surgeon: Renzo Jim MD;  Location: [facility] OR;  Service: General;  Laterality: Left;    INSERTION OF TUNNELED CENTRAL VENOUS CATHETER (CVC) WITH SUBCUTANEOUS PORT N/A 8/1/2022    Procedure: [id number];  Surgeon: Renzo Jim MD;  Location: [facility] OR;  Service: General;  Laterality: N/A;    MASTECTOMY Left 5/26/2022    Procedure: MASTECTOMY;  Surgeon: Renzo Jim MD;  Location: [facility] OR;  Service: General;  Laterality: Left;    MASTECTOMY, RADICAL Right     SENTINEL LYMPH NODE BIOPSY Left 5/26/2022    Procedure: BIOPSY, LYMPH NODE, SENTINEL;  Surgeon: Renzo Jim MD;  Location: [facility] OR;  Service: General;  Laterality: Left;    TUBAL LIGATION       Family History   Problem Relation Name  Age of Onset    Cancer Mother      Heart failure Father      Cancer Sister      Cancer Grandchild       Social History     Socioeconomic History    Marital status:     Number of children: 5   Tobacco Use    Smoking status: Never    Smokeless tobacco: Never   Substance and Sexual Activity    Alcohol use: Never    Drug use: Never    Sexual activity: Not Currently     Partners: Male     Social Drivers of Health     Financial Resource Strain: Low Risk  (10/24/2024)    Overall Financial Resource Strain (CARDIA)     Difficulty of Paying Living Expenses: Not hard at all   Food Insecurity: No Food Insecurity (10/24/2024)    Hunger Vital Sign     Worried About Running Out of Food in the Last Year: Never true     Ran Out of Food in the Last Year: Never true   Transportation Needs: No Transportation Needs (4/18/2024)    [facility] from [facility] and [facility] - Transportation     Lack of Transportation (Medical): No     Lack of Transportation (Non-Medical): No   Physical Activity: Inactive (10/24/2024)    Exercise Vital Sign     Days of Exercise per Week: 0 days     Minutes of Exercise per Session: 0 min   Stress: No Stress Concern Present (10/24/2024)    [employer] - Occupational Stress Questionnaire     Feeling of Stress : Not at all   Housing Stability: Unknown (10/24/2024)    Housing Stability Vital Sign     Unable to Pay for Housing in the Last Year: No       Current Outpatient Medications   Medication Sig Dispense Refill    acetaminophen (TYLENOL) 500 MG tablet Take 500 mg by mouth every 6 (six) hours as needed.      diazePAM (VALIUM) 2 MG tablet Take 2 mg by mouth.      furosemide (LASIX) 40 MG tablet Take 1 tablet by mouth daily as needed.      hydrALAZINE (APRESOLINE) 50 MG tablet Take 50 mg by mouth 2 (two) times daily.      lisinopriL (PRINIVIL,ZESTRIL) 40 MG tablet Take 40 mg by mouth.      NIFEdipine (ADALAT CC) 90 MG TbSR Take 90 mg by mouth.    "    No current facility-administered medications for this visit.     Facility-Administered Medications Ordered in Other Visits   Medication Dose Route Frequency Provider Last Rate Last Admin    electrolyte-S (ISOLYTE)   Intravenous Continuous Irwin Russo MD        fentaNYL 50 mcg/mL injection 25 mcg  25 mcg Intravenous Q5 Min PRN Irwin Russo MD        HYDROmorphone (PF) injection 0.2 mg  0.2 mg Intravenous Q5 Min PRN Irwin Russo MD        lactated ringers infusion   Intravenous Continuous Irwin Russo MD 10 mL/hr at 08/01/22 1120 Restarted at 08/01/22 1234    LIDOcaine (PF) 10 mg/ml (1%) injection 10 mg  1 mL Intradermal Once Irwin Russo MD        oxyCODONE immediate release tablet 5 mg  5 mg Oral Q3H PRN Irwin Russo MD         Review of patient's allergies indicates:  No Known Allergies    Review of Systems   Musculoskeletal:  Positive for arthralgias.   All other systems reviewed and are negative.      Objective:      Vitals:    01/28/25 1150   BP: (!) 165/69   BP Location: Left arm   Patient Position: Sitting   Pulse: 69   Weight: 91.6 kg (201 lb 15.1 oz)   Height: 5' 5.5" (1.664 m)     Physical Exam  Vitals and nursing note reviewed. Exam conducted with a chaperone present.   Constitutional:       Appearance: Normal appearance.   Cardiovascular:      Pulses:           Dorsalis pedis pulses are 1+ on the right side and 1+ on the left side.        Posterior tibial pulses are 0 on the right side and 0 on the left side.   Pulmonary:      Effort: Pulmonary effort is normal.   Musculoskeletal:         General: Tenderness and deformity present.   Feet:      Right foot:      Protective Sensation: 2 sites tested.  2 sites sensed.      Skin integrity: Erythema present.      Toenail Condition: Right toenails are abnormally thick, long and ingrown. Fungal disease present.     Left foot:      Protective Sensation: 2 sites tested.  2 sites sensed.      Skin integrity: Erythema present.      " Toenail Condition: Left toenails are abnormally thick, long and ingrown. Fungal disease present.  Skin:     General: Skin is warm.      Capillary Refill: Capillary refill takes more than 3 seconds.      Findings: Erythema present.   Neurological:      General: No focal deficit present.      Mental Status: She is alert.   Psychiatric:         Mood and Affect: Mood normal.         Behavior: Behavior normal.                                                          Assessment:       1. Ingrown nail    2. Hammer toes of both feet    3. Ulcer of both feet, limited to breakdown of skin          Plan:       Patient presents today complaining about painfully ingrown toenails on both feet she also has severe hammertoes her toes are rubbing together and causing irritation patient was treated for breast cancer in May of this year.  Patient also states she has some new calluses that have formed from some new shoes that were too tight and rubbing her toes.Patient's family member states that they had no idea that the patient's toenails were so long and so ingrown.   Patient had severely elongated ingrowing toenails on multiple digits bilateral while the patient did not need a nail avulsion I was able to aggressively trim and remove the ingrowing toenails which the patient tolerated well patient noted to me to be immediate relief upon removing the ingrown toenail this was especially noticeable on the bilateral hallux.  Patient does have irritation 5th digit left and needs to make sure she is wearing shoes that accommodate for the digital contracture this irritation could certainly lead to breakdown ulceration which can lead to complication with the patient's peripheral vascular disease.  Patient had several new areas of pre ulcerative callus formation the most significant was overlying the lateral aspect of the 5th metatarsal phalangeal joint right I did debride this area there is a little bit of a sore underlying this area and I  advised the patient this needs to be monitored closely but should resolve as long as no further irritation presents over the area patient also has hyperkeratotic lesion sub 5th metatarsal left all of this is related to the patient's new shoes that were too narrow rubbing and irritating these areas she states she is no longer wearing the shoes and now that she is not wearing the shoes these areas are not as tender as they had been each of these areas were non excisionally debrided.  Recommended follow-up will be as needed if the patient is not getting complete relief of the ingrowing toenails in the next 4-5 days to contact us for follow-up further evaluation treatment but I do recommend keeping the patient's skin well moisturized well hydrated on a daily basis.  Total time including discussion evaluation treatment discussion of treatment options treatment plan removal of ingrown toenails as well as comprehensive evaluation equaled 25 minutes.  The area of greatest concern was the medial border right hallux which was very deep and ingrown and just started to bother the patient about 2 days ago.  This note was created using [employer] voice recognition software that occasionally misinterpreted phrases or words.

## 2025-03-11 ENCOUNTER — OFFICE VISIT (OUTPATIENT)
Dept: PODIATRY | Facility: CLINIC | Age: 85
End: 2025-03-11
Payer: MEDICARE

## 2025-03-11 VITALS
SYSTOLIC BLOOD PRESSURE: 153 MMHG | HEIGHT: 66 IN | HEART RATE: 78 BPM | BODY MASS INDEX: 32.45 KG/M2 | WEIGHT: 201.94 LBS | DIASTOLIC BLOOD PRESSURE: 79 MMHG

## 2025-03-11 DIAGNOSIS — M20.42 HAMMER TOES OF BOTH FEET: ICD-10-CM

## 2025-03-11 DIAGNOSIS — L97.521 ULCER OF BOTH FEET, LIMITED TO BREAKDOWN OF SKIN: ICD-10-CM

## 2025-03-11 DIAGNOSIS — L60.0 INGROWN NAIL: Primary | ICD-10-CM

## 2025-03-11 DIAGNOSIS — M20.41 HAMMER TOES OF BOTH FEET: ICD-10-CM

## 2025-03-11 DIAGNOSIS — L97.511 ULCER OF BOTH FEET, LIMITED TO BREAKDOWN OF SKIN: ICD-10-CM

## 2025-03-11 PROCEDURE — 3077F SYST BP >= 140 MM HG: CPT | Mod: CPTII,S$GLB,, | Performed by: PODIATRIST

## 2025-03-11 PROCEDURE — 1160F RVW MEDS BY RX/DR IN RCRD: CPT | Mod: CPTII,S$GLB,, | Performed by: PODIATRIST

## 2025-03-11 PROCEDURE — 1159F MED LIST DOCD IN RCRD: CPT | Mod: CPTII,S$GLB,, | Performed by: PODIATRIST

## 2025-03-11 PROCEDURE — 99999 PR PBB SHADOW E&M-EST. PATIENT-LVL III: CPT | Mod: PBBFAC,,, | Performed by: PODIATRIST

## 2025-03-11 PROCEDURE — 1126F AMNT PAIN NOTED NONE PRSNT: CPT | Mod: CPTII,S$GLB,, | Performed by: PODIATRIST

## 2025-03-11 PROCEDURE — 99213 OFFICE O/P EST LOW 20 MIN: CPT | Mod: S$GLB,,, | Performed by: PODIATRIST

## 2025-03-11 PROCEDURE — 3288F FALL RISK ASSESSMENT DOCD: CPT | Mod: CPTII,S$GLB,, | Performed by: PODIATRIST

## 2025-03-11 PROCEDURE — 3078F DIAST BP <80 MM HG: CPT | Mod: CPTII,S$GLB,, | Performed by: PODIATRIST

## 2025-03-11 PROCEDURE — 1101F PT FALLS ASSESS-DOCD LE1/YR: CPT | Mod: CPTII,S$GLB,, | Performed by: PODIATRIST

## 2025-03-13 NOTE — PROGRESS NOTES
Subjective:       Patient ID: Rosa Aceves is a 84 y.o. female.    Chief Complaint: Ingrown Toenail  Patient presents today she is complaining about painfully ingrown toenails on both feet she also has severe hammertoes her toes are rubbing together and causing irritation patient was treated for breast cancer in May of this year.  Patient also states she has some new calluses that have formed from some new shoes that were too tight and rubbing her toes.    Past Medical History:   Diagnosis Date    Aortic valve regurgitation     Benign paroxysmal vertigo, bilateral     Chronic kidney disease, unspecified     Coronary artery disease     Essential (primary) hypertension     Malignant neoplasm of right breast     Mitral valve regurgitation     Obesity, unspecified     Osteopenia     Positive colorectal cancer screening using Cologuard test     Rheumatoid arthritis, unspecified     Ruptured lumbar disc     Unspecified cataract      Past Surgical History:   Procedure Laterality Date    BREAST BIOPSY Left     BREAST SURGERY Left     CHOLECYSTECTOMY      EYE SURGERY      INJECTION FOR SENTINEL NODE IDENTIFICATION Left 5/26/2022    Procedure: INJECTION, FOR SENTINEL NODE IDENTIFICATION;  Surgeon: Renzo Jim MD;  Location: [facility] OR;  Service: General;  Laterality: Left;    INSERTION OF TUNNELED CENTRAL VENOUS CATHETER (CVC) WITH SUBCUTANEOUS PORT N/A 8/1/2022    Procedure: [id number];  Surgeon: Renzo Jim MD;  Location: [facility] OR;  Service: General;  Laterality: N/A;    MASTECTOMY Left 5/26/2022    Procedure: MASTECTOMY;  Surgeon: Renzo Jim MD;  Location: [facility] OR;  Service: General;  Laterality: Left;    MASTECTOMY, RADICAL Right     SENTINEL LYMPH NODE BIOPSY Left 5/26/2022    Procedure: BIOPSY, LYMPH NODE, SENTINEL;  Surgeon: Renzo Jim MD;  Location: [facility] OR;  Service: General;  Laterality: Left;    TUBAL LIGATION       Family History   Problem Relation Name Age of Onset     Cancer Mother      Heart failure Father      Cancer Sister      Cancer Grandchild       Social History     Socioeconomic History    Marital status:     Number of children: 5   Tobacco Use    Smoking status: Never    Smokeless tobacco: Never   Substance and Sexual Activity    Alcohol use: Never    Drug use: Never    Sexual activity: Not Currently     Partners: Male     Social Drivers of Health     Financial Resource Strain: Low Risk  (3/11/2025)    Overall Financial Resource Strain (CARDIA)     Difficulty of Paying Living Expenses: Not very hard   Food Insecurity: No Food Insecurity (3/11/2025)    Hunger Vital Sign     Worried About Running Out of Food in the Last Year: Never true     Ran Out of Food in the Last Year: Never true   Transportation Needs: No Transportation Needs (3/11/2025)    PRAPARE - Transportation     Lack of Transportation (Medical): No     Lack of Transportation (Non-Medical): No   Physical Activity: Inactive (3/11/2025)    Exercise Vital Sign     Days of Exercise per Week: 0 days     Minutes of Exercise per Session: 0 min   Stress: No Stress Concern Present (3/11/2025)    [employer] [place] [employer] - Occupational Stress Questionnaire     Feeling of Stress : Not at all   Housing Stability: Unknown (3/11/2025)    Housing Stability Vital Sign     Unable to Pay for Housing in the Last Year: Patient declined     Number of Times Moved in the Last Year: 0     Homeless in the Last Year: No       Current Outpatient Medications   Medication Sig Dispense Refill    acetaminophen (TYLENOL) 500 MG tablet Take 500 mg by mouth every 6 (six) hours as needed.      diazePAM (VALIUM) 2 MG tablet Take 2 mg by mouth.      furosemide (LASIX) 40 MG tablet Take 1 tablet by mouth daily as needed.      hydrALAZINE (APRESOLINE) 50 MG tablet Take 50 mg by mouth 2 (two) times daily.      lisinopriL (PRINIVIL,ZESTRIL) 40 MG tablet Take 40 mg by mouth.      NIFEdipine (ADALAT CC) 90 MG TbSR Take 90 mg by  "mouth.       No current facility-administered medications for this visit.     Facility-Administered Medications Ordered in Other Visits   Medication Dose Route Frequency Provider Last Rate Last Admin    electrolyte-S (ISOLYTE)   Intravenous Continuous Irwin Russo MD        fentaNYL 50 mcg/mL injection 25 mcg  25 mcg Intravenous Q5 Min PRN Irwin Russo MD        HYDROmorphone (PF) injection 0.2 mg  0.2 mg Intravenous Q5 Min PRN Irwin Russo MD        lactated ringers infusion   Intravenous Continuous Irwin Russo MD 10 mL/hr at 08/01/22 1120 Restarted at 08/01/22 1234    LIDOcaine (PF) 10 mg/ml (1%) injection 10 mg  1 mL Intradermal Once Irwin Russo MD        oxyCODONE immediate release tablet 5 mg  5 mg Oral Q3H PRN Irwin Russo MD         Review of patient's allergies indicates:  No Known Allergies    Review of Systems   Musculoskeletal:  Positive for arthralgias.   All other systems reviewed and are negative.      Objective:      Vitals:    03/11/25 1134   BP: (!) 153/79   Pulse: 78   Weight: 91.6 kg (201 lb 15.1 oz)   Height: 5' 5.5" (1.664 m)     Physical Exam  Vitals and nursing note reviewed. Exam conducted with a chaperone present.   Constitutional:       Appearance: Normal appearance.   Cardiovascular:      Pulses:           Dorsalis pedis pulses are 1+ on the right side and 1+ on the left side.        Posterior tibial pulses are 0 on the right side and 0 on the left side.   Pulmonary:      Effort: Pulmonary effort is normal.   Musculoskeletal:         General: Tenderness and deformity present.   Feet:      Right foot:      Protective Sensation: 2 sites tested.  2 sites sensed.      Skin integrity: Erythema present.      Toenail Condition: Right toenails are abnormally thick, long and ingrown. Fungal disease present.     Left foot:      Protective Sensation: 2 sites tested.  2 sites sensed.      Skin integrity: Erythema present.      Toenail Condition: Left toenails are abnormally " thick, long and ingrown. Fungal disease present.  Skin:     General: Skin is warm.      Capillary Refill: Capillary refill takes more than 3 seconds.      Findings: Erythema present.   Neurological:      General: No focal deficit present.      Mental Status: She is alert.   Psychiatric:         Mood and Affect: Mood normal.         Behavior: Behavior normal.                                                          Assessment:       1. Ingrown nail    2. Hammer toes of both feet    3. Ulcer of both feet, limited to breakdown of skin          Plan:       Patient presents today complaining about painfully ingrown toenails on both feet she also has severe hammertoes her toes are rubbing together and causing irritation patient was treated for breast cancer in May of this year.  Patient also states she has some new calluses that have formed from some new shoes that were too tight and rubbing her toes.Patient's family member states that they had no idea that the patient's toenails were so long and so ingrown.   Patient had severely elongated ingrowing toenails on multiple digits bilateral while the patient did not need a nail avulsion I was able to aggressively trim and remove the ingrowing toenails which the patient tolerated well patient noted to me to be immediate relief upon removing the ingrown toenail this was especially noticeable on the bilateral hallux.  Patient does have irritation 5th digit left and needs to make sure she is wearing shoes that accommodate for the digital contracture this irritation could certainly lead to breakdown ulceration which can lead to complication with the patient's peripheral vascular disease.  Patient had several new areas of pre ulcerative callus formation the most significant was overlying the lateral aspect of the 5th metatarsal phalangeal joint right I did debride this area there is a little bit of a sore underlying this area and I advised the patient this needs to be monitored  closely but should resolve as long as no further irritation presents over the area patient also has hyperkeratotic lesion sub 5th metatarsal left all of this is related to the patient's new shoes that were too narrow rubbing and irritating these areas she states she is no longer wearing the shoes and now that she is not wearing the shoes these areas are not as tender as they had been each of these areas were non excisionally debrided.  Recommended follow-up will be as needed if the patient is not getting complete relief of the ingrowing toenails in the next 4-5 days to contact us for follow-up further evaluation treatment but I do recommend keeping the patient's skin well moisturized well hydrated on a daily basis.  Total time including discussion evaluation treatment discussion of treatment options treatment plan removal of ingrown toenails as well as comprehensive evaluation equaled 25 minutes.  The area of greatest concern was the medial border right hallux which was very deep and ingrown and just started to bother the patient about 3 days ago.  This note was created using [employer] voice recognition software that occasionally misinterpreted phrases or words.

## 2025-03-18 ENCOUNTER — OFFICE VISIT (OUTPATIENT)
Facility: CLINIC | Age: 85
End: 2025-03-18
Payer: MEDICARE

## 2025-03-18 VITALS
RESPIRATION RATE: 16 BRPM | OXYGEN SATURATION: 99 % | HEIGHT: 66 IN | BODY MASS INDEX: 32.95 KG/M2 | HEART RATE: 91 BPM | DIASTOLIC BLOOD PRESSURE: 82 MMHG | WEIGHT: 205 LBS | TEMPERATURE: 97 F | SYSTOLIC BLOOD PRESSURE: 141 MMHG

## 2025-03-18 DIAGNOSIS — Z17.1 MALIGNANT NEOPLASM OF CENTRAL PORTION OF LEFT BREAST IN FEMALE, ESTROGEN RECEPTOR NEGATIVE: Primary | ICD-10-CM

## 2025-03-18 DIAGNOSIS — Z17.421 TRIPLE NEGATIVE MALIGNANT NEOPLASM OF BREAST: ICD-10-CM

## 2025-03-18 DIAGNOSIS — C50.919 TRIPLE NEGATIVE MALIGNANT NEOPLASM OF BREAST: ICD-10-CM

## 2025-03-18 DIAGNOSIS — C50.112 MALIGNANT NEOPLASM OF CENTRAL PORTION OF LEFT BREAST IN FEMALE, ESTROGEN RECEPTOR NEGATIVE: Primary | ICD-10-CM

## 2025-03-18 PROCEDURE — 1101F PT FALLS ASSESS-DOCD LE1/YR: CPT | Mod: CPTII,S$GLB,, | Performed by: INTERNAL MEDICINE

## 2025-03-18 PROCEDURE — 3079F DIAST BP 80-89 MM HG: CPT | Mod: CPTII,S$GLB,, | Performed by: INTERNAL MEDICINE

## 2025-03-18 PROCEDURE — G2211 COMPLEX E/M VISIT ADD ON: HCPCS | Mod: S$GLB,,, | Performed by: INTERNAL MEDICINE

## 2025-03-18 PROCEDURE — 99215 OFFICE O/P EST HI 40 MIN: CPT | Mod: S$GLB,,, | Performed by: INTERNAL MEDICINE

## 2025-03-18 PROCEDURE — 1159F MED LIST DOCD IN RCRD: CPT | Mod: CPTII,S$GLB,, | Performed by: INTERNAL MEDICINE

## 2025-03-18 PROCEDURE — 3288F FALL RISK ASSESSMENT DOCD: CPT | Mod: CPTII,S$GLB,, | Performed by: INTERNAL MEDICINE

## 2025-03-18 PROCEDURE — 99999 PR PBB SHADOW E&M-EST. PATIENT-LVL IV: CPT | Mod: PBBFAC,,, | Performed by: INTERNAL MEDICINE

## 2025-03-18 PROCEDURE — 3077F SYST BP >= 140 MM HG: CPT | Mod: CPTII,S$GLB,, | Performed by: INTERNAL MEDICINE

## 2025-03-18 PROCEDURE — 1126F AMNT PAIN NOTED NONE PRSNT: CPT | Mod: CPTII,S$GLB,, | Performed by: INTERNAL MEDICINE

## 2025-03-31 ENCOUNTER — HOSPITAL ENCOUNTER (OUTPATIENT)
Dept: PREADMISSION TESTING | Facility: HOSPITAL | Age: 85
Discharge: HOME | End: 2025-03-31
Attending: SURGERY
Payer: MEDICARE

## 2025-03-31 ENCOUNTER — OFFICE VISIT (OUTPATIENT)
Dept: SURGERY | Facility: CLINIC | Age: 85
End: 2025-03-31
Payer: MEDICARE

## 2025-03-31 VITALS
HEART RATE: 81 BPM | HEIGHT: 66 IN | DIASTOLIC BLOOD PRESSURE: 84 MMHG | SYSTOLIC BLOOD PRESSURE: 150 MMHG | WEIGHT: 205 LBS | BODY MASS INDEX: 32.95 KG/M2 | TEMPERATURE: 97 F

## 2025-03-31 DIAGNOSIS — Z17.1 MALIGNANT NEOPLASM OF CENTRAL PORTION OF LEFT BREAST IN FEMALE, ESTROGEN RECEPTOR NEGATIVE: ICD-10-CM

## 2025-03-31 DIAGNOSIS — C50.112 MALIGNANT NEOPLASM OF CENTRAL PORTION OF LEFT BREAST IN FEMALE, ESTROGEN RECEPTOR NEGATIVE: ICD-10-CM

## 2025-03-31 DIAGNOSIS — Z95.828 PORT-A-CATH IN PLACE: ICD-10-CM

## 2025-03-31 DIAGNOSIS — Z95.828 PORT-A-CATH IN PLACE: Primary | ICD-10-CM

## 2025-03-31 LAB
OHS QRS DURATION: 86 MS
OHS QTC CALCULATION: 406 MS

## 2025-03-31 PROCEDURE — 3079F DIAST BP 80-89 MM HG: CPT | Mod: CPTII,S$GLB,, | Performed by: SURGERY

## 2025-03-31 PROCEDURE — 99213 OFFICE O/P EST LOW 20 MIN: CPT | Mod: S$GLB,,, | Performed by: SURGERY

## 2025-03-31 PROCEDURE — 93010 ELECTROCARDIOGRAM REPORT: CPT | Mod: ,,, | Performed by: GENERAL PRACTICE

## 2025-03-31 PROCEDURE — 99999 PR PBB SHADOW E&M-EST. PATIENT-LVL V: CPT | Mod: PBBFAC,,, | Performed by: SURGERY

## 2025-03-31 PROCEDURE — 3077F SYST BP >= 140 MM HG: CPT | Mod: CPTII,S$GLB,, | Performed by: SURGERY

## 2025-03-31 PROCEDURE — 93005 ELECTROCARDIOGRAM TRACING: CPT | Performed by: GENERAL PRACTICE

## 2025-03-31 PROCEDURE — 1159F MED LIST DOCD IN RCRD: CPT | Mod: CPTII,S$GLB,, | Performed by: SURGERY

## 2025-03-31 PROCEDURE — 1160F RVW MEDS BY RX/DR IN RCRD: CPT | Mod: CPTII,S$GLB,, | Performed by: SURGERY

## 2025-03-31 PROCEDURE — 1126F AMNT PAIN NOTED NONE PRSNT: CPT | Mod: CPTII,S$GLB,, | Performed by: SURGERY

## 2025-03-31 RX ORDER — SODIUM CHLORIDE 9 MG/ML
INJECTION, SOLUTION INTRAVENOUS CONTINUOUS
OUTPATIENT
Start: 2025-03-31

## 2025-03-31 RX ORDER — CEFAZOLIN SODIUM 2 G/50ML
2 SOLUTION INTRAVENOUS
Status: CANCELLED | OUTPATIENT
Start: 2025-03-31

## 2025-04-02 ENCOUNTER — ANESTHESIA EVENT (OUTPATIENT)
Dept: SURGERY | Facility: HOSPITAL | Age: 85
End: 2025-04-02
Payer: MEDICARE

## 2025-04-02 RX ORDER — SODIUM CHLORIDE, SODIUM LACTATE, POTASSIUM CHLORIDE, CALCIUM CHLORIDE 600; 310; 30; 20 MG/100ML; MG/100ML; MG/100ML; MG/100ML
INJECTION, SOLUTION INTRAVENOUS CONTINUOUS
Status: CANCELLED | OUTPATIENT
Start: 2025-04-02

## 2025-04-02 RX ORDER — LIDOCAINE HYDROCHLORIDE 10 MG/ML
0.5 INJECTION, SOLUTION EPIDURAL; INFILTRATION; INTRACAUDAL; PERINEURAL ONCE
Status: CANCELLED | OUTPATIENT
Start: 2025-04-02 | End: 2025-04-02

## 2025-04-02 NOTE — OR NURSING
PAT done via phone. Preop instructions reviewed with verbalized understanding. No MyChart set-up. Explained to patient NOT to take Lisinopril or Lasix tomorrow morning. Verbalized understanding.

## 2025-04-03 ENCOUNTER — ANESTHESIA (OUTPATIENT)
Dept: SURGERY | Facility: HOSPITAL | Age: 85
End: 2025-04-03
Payer: MEDICARE

## 2025-04-03 ENCOUNTER — HOSPITAL ENCOUNTER (OUTPATIENT)
Facility: HOSPITAL | Age: 85
Discharge: HOME | End: 2025-04-03
Attending: SURGERY | Admitting: SURGERY
Payer: MEDICARE

## 2025-04-03 DIAGNOSIS — Z95.828 PORT-A-CATH IN PLACE: Primary | ICD-10-CM

## 2025-04-03 PROCEDURE — 63600175 PHARM REV CODE 636 W HCPCS: Performed by: SURGERY

## 2025-04-03 PROCEDURE — 63600175 PHARM REV CODE 636 W HCPCS: Performed by: STUDENT IN AN ORGANIZED HEALTH CARE EDUCATION/TRAINING PROGRAM

## 2025-04-03 PROCEDURE — 36590 REMOVAL TUNNELED CV CATH: CPT | Mod: ,,, | Performed by: SURGERY

## 2025-04-03 PROCEDURE — 25000003 PHARM REV CODE 250: Performed by: ANESTHESIOLOGY

## 2025-04-03 PROCEDURE — 71000015 HC POSTOP RECOV 1ST HR: Performed by: SURGERY

## 2025-04-03 PROCEDURE — 71000033 HC RECOVERY, INTIAL HOUR: Performed by: SURGERY

## 2025-04-03 PROCEDURE — 94799 UNLISTED PULMONARY SVC/PX: CPT

## 2025-04-03 PROCEDURE — 36000707: Performed by: SURGERY

## 2025-04-03 PROCEDURE — 37000009 HC ANESTHESIA EA ADD 15 MINS: Performed by: SURGERY

## 2025-04-03 PROCEDURE — 36000706: Performed by: SURGERY

## 2025-04-03 PROCEDURE — 37000008 HC ANESTHESIA 1ST 15 MINUTES: Performed by: SURGERY

## 2025-04-03 PROCEDURE — 71000039 HC RECOVERY, EACH ADD'L HOUR: Performed by: SURGERY

## 2025-04-03 RX ORDER — CEFAZOLIN 2 G/1
2 INJECTION, POWDER, FOR SOLUTION INTRAMUSCULAR; INTRAVENOUS
Status: COMPLETED | OUTPATIENT
Start: 2025-04-03 | End: 2025-04-03

## 2025-04-03 RX ORDER — LIDOCAINE HYDROCHLORIDE 20 MG/ML
INJECTION INTRAVENOUS
Status: DISCONTINUED | OUTPATIENT
Start: 2025-04-03 | End: 2025-04-03

## 2025-04-03 RX ORDER — SODIUM CHLORIDE 9 MG/ML
INJECTION, SOLUTION INTRAVENOUS CONTINUOUS
Status: CANCELLED | OUTPATIENT
Start: 2025-04-03

## 2025-04-03 RX ORDER — GLUCAGON 1 MG
1 KIT INJECTION
Status: DISCONTINUED | OUTPATIENT
Start: 2025-04-03 | End: 2025-04-03 | Stop reason: HOSPADM

## 2025-04-03 RX ORDER — PROPOFOL 10 MG/ML
VIAL (ML) INTRAVENOUS CONTINUOUS PRN
Status: DISCONTINUED | OUTPATIENT
Start: 2025-04-03 | End: 2025-04-03

## 2025-04-03 RX ORDER — FENTANYL CITRATE 50 UG/ML
INJECTION, SOLUTION INTRAMUSCULAR; INTRAVENOUS
Status: DISCONTINUED | OUTPATIENT
Start: 2025-04-03 | End: 2025-04-03

## 2025-04-03 RX ORDER — DIPHENHYDRAMINE HYDROCHLORIDE 50 MG/ML
25 INJECTION, SOLUTION INTRAMUSCULAR; INTRAVENOUS EVERY 6 HOURS PRN
Status: DISCONTINUED | OUTPATIENT
Start: 2025-04-03 | End: 2025-04-03 | Stop reason: HOSPADM

## 2025-04-03 RX ORDER — PROPOFOL 10 MG/ML
VIAL (ML) INTRAVENOUS
Status: DISCONTINUED | OUTPATIENT
Start: 2025-04-03 | End: 2025-04-03

## 2025-04-03 RX ORDER — FENTANYL CITRATE 50 UG/ML
25 INJECTION, SOLUTION INTRAMUSCULAR; INTRAVENOUS EVERY 5 MIN PRN
Status: DISCONTINUED | OUTPATIENT
Start: 2025-04-03 | End: 2025-04-03 | Stop reason: HOSPADM

## 2025-04-03 RX ORDER — ONDANSETRON HYDROCHLORIDE 2 MG/ML
INJECTION, SOLUTION INTRAVENOUS
Status: DISCONTINUED | OUTPATIENT
Start: 2025-04-03 | End: 2025-04-03

## 2025-04-03 RX ORDER — ONDANSETRON HYDROCHLORIDE 2 MG/ML
4 INJECTION, SOLUTION INTRAVENOUS ONCE
Status: DISCONTINUED | OUTPATIENT
Start: 2025-04-03 | End: 2025-04-03 | Stop reason: HOSPADM

## 2025-04-03 RX ORDER — OXYCODONE HYDROCHLORIDE 5 MG/1
5 TABLET ORAL
Status: DISCONTINUED | OUTPATIENT
Start: 2025-04-03 | End: 2025-04-03 | Stop reason: HOSPADM

## 2025-04-03 RX ORDER — ACETAMINOPHEN 10 MG/ML
INJECTION, SOLUTION INTRAVENOUS
Status: DISCONTINUED | OUTPATIENT
Start: 2025-04-03 | End: 2025-04-03

## 2025-04-03 RX ORDER — HYDROCODONE BITARTRATE AND ACETAMINOPHEN 5; 325 MG/1; MG/1
1 TABLET ORAL EVERY 6 HOURS PRN
Qty: 10 TABLET | Refills: 0 | Status: SHIPPED | OUTPATIENT
Start: 2025-04-03

## 2025-04-03 RX ORDER — PROCHLORPERAZINE EDISYLATE 5 MG/ML
5 INJECTION INTRAMUSCULAR; INTRAVENOUS EVERY 4 HOURS PRN
Status: DISCONTINUED | OUTPATIENT
Start: 2025-04-03 | End: 2025-04-03 | Stop reason: HOSPADM

## 2025-04-03 RX ADMIN — FENTANYL CITRATE 25 MCG: 50 INJECTION, SOLUTION INTRAMUSCULAR; INTRAVENOUS at 09:04

## 2025-04-03 RX ADMIN — CEFAZOLIN 2 G: 2 INJECTION, POWDER, FOR SOLUTION INTRAMUSCULAR; INTRAVENOUS at 09:04

## 2025-04-03 RX ADMIN — OXYCODONE 5 MG: 5 TABLET ORAL at 09:04

## 2025-04-03 RX ADMIN — LIDOCAINE HYDROCHLORIDE 80 MG: 20 INJECTION, SOLUTION INTRAVENOUS at 08:04

## 2025-04-03 RX ADMIN — PROPOFOL 30 MG: 10 INJECTION, EMULSION INTRAVENOUS at 08:04

## 2025-04-03 RX ADMIN — ACETAMINOPHEN 1000 MG: 10 INJECTION INTRAVENOUS at 09:04

## 2025-04-03 RX ADMIN — ONDANSETRON 4 MG: 2 INJECTION INTRAMUSCULAR; INTRAVENOUS at 08:04

## 2025-04-03 RX ADMIN — SODIUM CHLORIDE, SODIUM GLUCONATE, SODIUM ACETATE, POTASSIUM CHLORIDE, MAGNESIUM CHLORIDE, SODIUM PHOSPHATE, DIBASIC, AND POTASSIUM PHOSPHATE: .53; .5; .37; .037; .03; .012; .00082 INJECTION, SOLUTION INTRAVENOUS at 08:04

## 2025-04-03 RX ADMIN — PROPOFOL 100 MCG/KG/MIN: 10 INJECTION, EMULSION INTRAVENOUS at 08:04

## 2025-04-03 NOTE — TRANSFER OF CARE
Anesthesia Transfer of Care Note    Patient: Rosa Aceves    Procedure(s) Performed: Procedure(s) (LRB):  REMOVAL, CATHETER, CENTRAL VENOUS, TUNNELED (Right)    Patient location: PACU    Anesthesia Type: MAC    Transport from OR: Transported from OR on 2-3 L/min O2 by NC with adequate spontaneous ventilation    Post pain: adequate analgesia    Post assessment: no apparent anesthetic complications and tolerated procedure well    Post vital signs: stable    Level of consciousness: awake and alert    Nausea/Vomiting: no nausea/vomiting    Complications: none    Transfer of care protocol was followed      Last vitals: Visit Vitals  BP (!) 183/84 (BP Location: Right forearm, Patient Position: Lying)   Pulse 78   Temp 36.4 °C (97.6 °F) (Skin)   Resp 16   Wt 93 kg (205 lb)   SpO2 95%   Breastfeeding No   BMI 33.59 kg/m²

## 2025-04-03 NOTE — DISCHARGE SUMMARY
Discharge Summary  General Surgery      Admit Date: 4/3/2025    Discharge Date :4/3/2025    Attending Physician: Renzo Jim     Discharge Physician: Renzo Jim    Discharged Condition: good    Discharge Diagnosis: Port-A-Cath in place [Z95.828]    Treatments/Procedures: Procedure(s) (LRB):  REMOVAL, CATHETER, CENTRAL VENOUS, TUNNELED (Right)    Hospital Course: Uneventful; Discharged home from Recovery    Significant Diagnostic Studies: none    Disposition: Home or Self Care    Diet: Regular    Follow up: Office 10-14 days    Activity: No heavy lifting till seen in office.    Patient Instructions:   Current Discharge Medication List        START taking these medications    Details   HYDROcodone-acetaminophen (NORCO) 5-325 mg per tablet Take 1 tablet by mouth every 6 (six) hours as needed for Pain.  Qty: 10 tablet, Refills: 0    Comments: n/a   Associated Diagnoses: Port-A-Cath in place           CONTINUE these medications which have NOT CHANGED    Details   acetaminophen (TYLENOL) 500 MG tablet Take 500 mg by mouth every 6 (six) hours as needed.      lisinopriL (PRINIVIL,ZESTRIL) 40 MG tablet Take 40 mg by mouth.      NIFEdipine (ADALAT CC) 90 MG TbSR Take 90 mg by mouth.      diazePAM (VALIUM) 2 MG tablet Take 2 mg by mouth.      furosemide (LASIX) 40 MG tablet Take 1 tablet by mouth daily as needed.      hydrALAZINE (APRESOLINE) 50 MG tablet Take 50 mg by mouth 2 (two) times daily.             Discharge Procedure Orders   Diet general

## 2025-04-03 NOTE — OP NOTE
PATIENT NAME:  Rosa Aceves     DATE OF PROCEDURE: 4/3/2025    SURGEON: Renzo Jim Jr., M.D.    ASSISTANT: None    PROCEDURE: Removal of Port-A-Cath.    PREOPERATIVE DIAGNOSIS:  History of breast cancer    POSTOPERATIVE DIAGNOSIS: Same    PROCEDURE IN DETAIL: After appropriate consent was obtained, consent forms   signed and questions answered, the patient was taken to the Operating Room and   placed on the operating room table where monitored anesthesia care was   undertaken without difficulty. Preoperative antibiotics were administered.   SCDs were in place. A time-out procedure was performed. The area was prepped   and draped in the usual sterile fashion. Then, 1% lidocaine with epinephrine   was injected for local anesthetic and then a skin incision was made at the site   of the previous incision. Dissection was then performed down through the   subcutaneous tissues to the port itself. The port was grasped and then   retracted. Stay sutures were cut and removed. The port was then removed and   the tract closed with a figure-of-eight 3-0 Vicryl suture. Subcutaneous tissues  were reapproximated with a running 3-0 Vicryl suture and the skin was closed   with a 4-0 Monocryl subcuticular stitch. Steri-Strips and dressings were   applied. The patient was awakened and transferred to the Recovery Room in a   stable condition. She tolerated the procedure without complication. Blood loss  was minimal. Counts were correct at the end of the procedure.    EBL: 5 cc    COMPLICATIONS: none.    SPECIMEN: none.

## 2025-04-03 NOTE — PROGRESS NOTES
Subjective:       Patient ID: Rosa Aceves is a 84 y.o. female.    Chief Complaint: Establish Care (Port removal)      HPI 84-year-old female presents for Port-A-Cath removal.  She says the port was never used as she only received 1 dose of chemotherapy and did not tolerate this.  She did not need the port any longer.    Past Medical History:   Diagnosis Date    Aortic valve regurgitation     Benign paroxysmal vertigo, bilateral     Chronic kidney disease, unspecified     Coronary artery disease     Essential (primary) hypertension     Malignant neoplasm of right breast     Mitral valve regurgitation     Obesity, unspecified     Osteopenia     Positive colorectal cancer screening using Cologuard test     Rheumatoid arthritis, unspecified     Ruptured lumbar disc     Unspecified cataract      Past Surgical History:   Procedure Laterality Date    BREAST BIOPSY Left     BREAST SURGERY Left     CHOLECYSTECTOMY      EYE SURGERY      INJECTION FOR SENTINEL NODE IDENTIFICATION Left 5/26/2022    Procedure: INJECTION, FOR SENTINEL NODE IDENTIFICATION;  Surgeon: Renzo Jim MD;  Location: [facility] OR;  Service: General;  Laterality: Left;    INSERTION OF TUNNELED CENTRAL VENOUS CATHETER (CVC) WITH SUBCUTANEOUS PORT N/A 8/1/2022    Procedure: [id number];  Surgeon: Renzo Jim MD;  Location: [facility] OR;  Service: General;  Laterality: N/A;    MASTECTOMY Left 5/26/2022    Procedure: MASTECTOMY;  Surgeon: Renzo Jim MD;  Location: [facility] OR;  Service: General;  Laterality: Left;    MASTECTOMY, RADICAL Right     SENTINEL LYMPH NODE BIOPSY Left 5/26/2022    Procedure: BIOPSY, LYMPH NODE, SENTINEL;  Surgeon: Renzo Jim MD;  Location: [facility] OR;  Service: General;  Laterality: Left;    TUBAL LIGATION         Current Medications[1]    Review of patient's allergies indicates:  No Known Allergies    Family History   Problem Relation Name Age of Onset    Cancer Mother      Heart failure Father       Cancer Sister      Cancer Grandchild       Social History[2]    Review of Systems   Respiratory: Negative.     Cardiovascular: Negative.    Gastrointestinal: Negative.      Objective:     Physical Exam  Constitutional:       General: She is not in acute distress.     Appearance: She is well-developed.   HENT:      Head: Normocephalic and atraumatic.   Neck:      Thyroid: No thyromegaly.   Cardiovascular:      Rate and Rhythm: Normal rate and regular rhythm.      Heart sounds: No murmur heard.  Pulmonary:      Effort: Pulmonary effort is normal.      Breath sounds: Normal breath sounds. No wheezing or rales.      Comments: Right subclavian Port-A-Cath in place without any complication.  Abdominal:      General: Bowel sounds are normal. There is no distension.      Palpations: Abdomen is soft. There is no mass.      Tenderness: There is no abdominal tenderness.      Hernia: No hernia is present.   Musculoskeletal:         General: Normal range of motion.      Cervical back: Normal range of motion.   Lymphadenopathy:      Cervical: No cervical adenopathy.   Neurological:      Mental Status: She is alert and oriented to person, place, and time.   Psychiatric:         Behavior: Behavior normal.       Assessment:     Encounter Diagnoses   Name Primary?    Malignant neoplasm of central portion of left breast in female, estrogen receptor negative     Port-A-Cath in place Yes       Plan:      Plan Port-A-Cath removal.  Risks and benefits of the planned procedure were discussed at length with the patient.  Risks and benefits of not proceeding with the procedure were discussed as well. All questions were answered. The patient expressed clear understanding and would like to proceed with the procedure as discussed.         [1] No current facility-administered medications for this visit.  No current outpatient medications on file.    Facility-Administered Medications Ordered in Other Visits:     electrolyte-S (ISOLYTE), ,  Intravenous, Continuous, Irwin Russo MD    fentaNYL 50 mcg/mL injection 25 mcg, 25 mcg, Intravenous, Q5 Min PRN, Irwin Russo MD    HYDROmorphone (PF) injection 0.2 mg, 0.2 mg, Intravenous, Q5 Min PRN, Irwin Russo MD    lactated ringers infusion, , Intravenous, Continuous, Irwin Russo MD, Last Rate: 10 mL/hr at 08/01/22 1120, Restarted at 08/01/22 1234    LIDOcaine (PF) 10 mg/ml (1%) injection 10 mg, 1 mL, Intradermal, Once, Irwin Russo MD    oxyCODONE immediate release tablet 5 mg, 5 mg, Oral, Q3H PRN, Irwin Russo MD  [2]   Social History  Socioeconomic History    Marital status:     Number of children: 5   Tobacco Use    Smoking status: Never    Smokeless tobacco: Never   Substance and Sexual Activity    Alcohol use: Never    Drug use: Never    Sexual activity: Not Currently     Partners: Male     Social Drivers of Health     Financial Resource Strain: Low Risk  (3/11/2025)    Overall Financial Resource Strain (CARDIA)     Difficulty of Paying Living Expenses: Not very hard   Food Insecurity: No Food Insecurity (3/11/2025)    Hunger Vital Sign     Worried About Running Out of Food in the Last Year: Never true     Ran Out of Food in the Last Year: Never true   Transportation Needs: No Transportation Needs (3/11/2025)    PRAPARE - Transportation     Lack of Transportation (Medical): No     Lack of Transportation (Non-Medical): No   Physical Activity: Inactive (3/11/2025)    Exercise Vital Sign     Days of Exercise per Week: 0 days     Minutes of Exercise per Session: 0 min   Stress: No Stress Concern Present (3/11/2025)    [employer] [place] [employer] - Occupational Stress Questionnaire     Feeling of Stress : Not at all   Housing Stability: Unknown (3/11/2025)    Housing Stability Vital Sign     Unable to Pay for Housing in the Last Year: Patient declined     Number of Times Moved in the Last Year: 0     Homeless in the Last Year: No

## 2025-04-03 NOTE — PLAN OF CARE
Patient ready for surgery. Surgery and anesthesia consents signed. Educated on incentive spirometer use. Belongings in pre-op locker. Daughter set up with text message notifications.

## 2025-04-03 NOTE — H&P (VIEW-ONLY)
Subjective:       Patient ID: Rosa Aceves is a 84 y.o. female.    Chief Complaint: Establish Care (Port removal)      HPI 84-year-old female presents for Port-A-Cath removal.  She says the port was never used as she only received 1 dose of chemotherapy and did not tolerate this.  She did not need the port any longer.    Past Medical History:   Diagnosis Date    Aortic valve regurgitation     Benign paroxysmal vertigo, bilateral     Chronic kidney disease, unspecified     Coronary artery disease     Essential (primary) hypertension     Malignant neoplasm of right breast     Mitral valve regurgitation     Obesity, unspecified     Osteopenia     Positive colorectal cancer screening using Cologuard test     Rheumatoid arthritis, unspecified     Ruptured lumbar disc     Unspecified cataract      Past Surgical History:   Procedure Laterality Date    BREAST BIOPSY Left     BREAST SURGERY Left     CHOLECYSTECTOMY      EYE SURGERY      INJECTION FOR SENTINEL NODE IDENTIFICATION Left 5/26/2022    Procedure: INJECTION, FOR SENTINEL NODE IDENTIFICATION;  Surgeon: Renzo Jim MD;  Location: [facility] OR;  Service: General;  Laterality: Left;    INSERTION OF TUNNELED CENTRAL VENOUS CATHETER (CVC) WITH SUBCUTANEOUS PORT N/A 8/1/2022    Procedure: [id number];  Surgeon: Renzo Jim MD;  Location: [facility] OR;  Service: General;  Laterality: N/A;    MASTECTOMY Left 5/26/2022    Procedure: MASTECTOMY;  Surgeon: Renzo Jim MD;  Location: [facility] OR;  Service: General;  Laterality: Left;    MASTECTOMY, RADICAL Right     SENTINEL LYMPH NODE BIOPSY Left 5/26/2022    Procedure: BIOPSY, LYMPH NODE, SENTINEL;  Surgeon: Renzo Jim MD;  Location: [facility] OR;  Service: General;  Laterality: Left;    TUBAL LIGATION         Current Medications[1]    Review of patient's allergies indicates:  No Known Allergies    Family History   Problem Relation Name Age of Onset    Cancer Mother      Heart failure Father       Cancer Sister      Cancer Grandchild       Social History[2]    Review of Systems   Respiratory: Negative.     Cardiovascular: Negative.    Gastrointestinal: Negative.      Objective:     Physical Exam  Constitutional:       General: She is not in acute distress.     Appearance: She is well-developed.   HENT:      Head: Normocephalic and atraumatic.   Neck:      Thyroid: No thyromegaly.   Cardiovascular:      Rate and Rhythm: Normal rate and regular rhythm.      Heart sounds: No murmur heard.  Pulmonary:      Effort: Pulmonary effort is normal.      Breath sounds: Normal breath sounds. No wheezing or rales.      Comments: Right subclavian Port-A-Cath in place without any complication.  Abdominal:      General: Bowel sounds are normal. There is no distension.      Palpations: Abdomen is soft. There is no mass.      Tenderness: There is no abdominal tenderness.      Hernia: No hernia is present.   Musculoskeletal:         General: Normal range of motion.      Cervical back: Normal range of motion.   Lymphadenopathy:      Cervical: No cervical adenopathy.   Neurological:      Mental Status: She is alert and oriented to person, place, and time.   Psychiatric:         Behavior: Behavior normal.       Assessment:     Encounter Diagnoses   Name Primary?    Malignant neoplasm of central portion of left breast in female, estrogen receptor negative     Port-A-Cath in place Yes       Plan:      Plan Port-A-Cath removal.  Risks and benefits of the planned procedure were discussed at length with the patient.  Risks and benefits of not proceeding with the procedure were discussed as well. All questions were answered. The patient expressed clear understanding and would like to proceed with the procedure as discussed.         [1] No current facility-administered medications for this visit.  No current outpatient medications on file.    Facility-Administered Medications Ordered in Other Visits:     electrolyte-S (ISOLYTE), ,  Intravenous, Continuous, Irwin Russo MD    fentaNYL 50 mcg/mL injection 25 mcg, 25 mcg, Intravenous, Q5 Min PRN, Irwin Russo MD    HYDROmorphone (PF) injection 0.2 mg, 0.2 mg, Intravenous, Q5 Min PRN, Irwin Russo MD    lactated ringers infusion, , Intravenous, Continuous, Irwin Russo MD, Last Rate: 10 mL/hr at 08/01/22 1120, Restarted at 08/01/22 1234    LIDOcaine (PF) 10 mg/ml (1%) injection 10 mg, 1 mL, Intradermal, Once, Irwin Russo MD    oxyCODONE immediate release tablet 5 mg, 5 mg, Oral, Q3H PRN, Irwin Russo MD  [2]   Social History  Socioeconomic History    Marital status:     Number of children: 5   Tobacco Use    Smoking status: Never    Smokeless tobacco: Never   Substance and Sexual Activity    Alcohol use: Never    Drug use: Never    Sexual activity: Not Currently     Partners: Male     Social Drivers of Health     Financial Resource Strain: Low Risk  (3/11/2025)    Overall Financial Resource Strain (CARDIA)     Difficulty of Paying Living Expenses: Not very hard   Food Insecurity: No Food Insecurity (3/11/2025)    Hunger Vital Sign     Worried About Running Out of Food in the Last Year: Never true     Ran Out of Food in the Last Year: Never true   Transportation Needs: No Transportation Needs (3/11/2025)    PRAPARE - Transportation     Lack of Transportation (Medical): No     Lack of Transportation (Non-Medical): No   Physical Activity: Inactive (3/11/2025)    Exercise Vital Sign     Days of Exercise per Week: 0 days     Minutes of Exercise per Session: 0 min   Stress: No Stress Concern Present (3/11/2025)    [employer] [place] [employer] - Occupational Stress Questionnaire     Feeling of Stress : Not at all   Housing Stability: Unknown (3/11/2025)    Housing Stability Vital Sign     Unable to Pay for Housing in the Last Year: Patient declined     Number of Times Moved in the Last Year: 0     Homeless in the Last Year: No

## 2025-04-03 NOTE — ANESTHESIA POSTPROCEDURE EVALUATION
Anesthesia Post Evaluation    Patient: Rosa Aceves    Procedure(s) Performed: Procedure(s) (LRB):  REMOVAL, CATHETER, CENTRAL VENOUS, TUNNELED (Right)    Final Anesthesia Type: MAC      Patient location during evaluation: PACU  Patient participation: Yes- Able to Participate  Level of consciousness: awake and alert  Post-procedure vital signs: reviewed and stable  Pain management: adequate  Airway patency: patent    PONV status at discharge: No PONV  Anesthetic complications: no      Cardiovascular status: blood pressure returned to baseline  Respiratory status: unassisted and room air  Hydration status: euvolemic  Follow-up not needed.              Vitals Value Taken Time   /67 04/03/25 10:05   Temp  04/03/25 10:10   Pulse 68 04/03/25 10:09   Resp 28 04/03/25 10:09   SpO2 94 % 04/03/25 10:09   Vitals shown include unfiled device data.      No case tracking events are documented in the log.      Pain/Yaneth Score: Pain Rating Prior to Med Admin: 2 (4/3/2025  9:29 AM)  Yaneth Score: 10 (4/3/2025  9:45 AM)

## 2025-04-03 NOTE — DISCHARGE INSTRUCTIONS
"Discharge Instructions: After Your Surgery/Procedure  Youve just had surgery. During surgery you were given medicine called anesthesia to keep you relaxed and free of pain. After surgery you may have some pain or nausea. This is common. Here are some tips for feeling better and getting well after surgery.     Stay on schedule with your medication.   Going home  Your doctor or nurse will show you how to take care of yourself when you go home. He or she will also answer your questions. Have an adult family member or friend drive you home.      For your safety we recommend these precaution for the first 24 hours after your procedure:  Do not drive or use heavy equipment.  Do not make important decisions or sign legal papers.  Do not drink alcohol.  Have someone stay with you, if needed. He or she can watch for problems and help keep you safe.  Your concentration, balance, coordination, and judgement may be impaired for many hours after anesthesia.  Use caution when ambulating or standing up.     You may feel weak and "washed out" after anesthesia and surgery.      Subtle residual effects of general anesthesia or sedation with regional / local anesthesia can last more than 24 hours.  Rest for the remainder of the day or longer if your Doctor/Surgeon has advised you to do so.  Although you may feel normal within the first 24 hours, your reflexes and mental ability may be impaired without you realizing it.  You may feel dizzy, lightheaded or sleepy for 24 hours or longer.      Be sure to go to all follow-up visits with your doctor. And rest after your surgery for as long as your doctor tells you to.  Coping with pain  If you have pain after surgery, pain medicine will help you feel better. Take it as told, before pain becomes severe. Also, ask your doctor or pharmacist about other ways to control pain. This might be with heat, ice, or relaxation. And follow any other instructions your surgeon or nurse gives you.  Tips " for taking pain medicine  To get the best relief possible, remember these points:  Pain medicines can upset your stomach. Taking them with a little food may help.  Most pain relievers taken by mouth need at least 20 to 30 minutes to start to work.  Taking medicine on a schedule can help you remember to take it. Try to time your medicine so that you can take it before starting an activity. This might be before you get dressed, go for a walk, or sit down for dinner.  Constipation is a common side effect of pain medicines. Call your doctor before taking any medicines such as laxatives or stool softeners to help ease constipation. Also ask if you should skip any foods. Drinking lots of fluids and eating foods such as fruits and vegetables that are high in fiber can also help. Remember, do not take laxatives unless your surgeon has prescribed them.  Drinking alcohol and taking pain medicine can cause dizziness and slow your breathing. It can even be deadly. Do not drink alcohol while taking pain medicine.  Pain medicine can make you react more slowly to things. Do not drive or run machinery while taking pain medicine.  Your health care provider may tell you to take acetaminophen to help ease your pain. Ask him or her how much you are supposed to take each day. Acetaminophen or other pain relievers may interact with your prescription medicines or other over-the-counter (OTC) drugs. Some prescription medicines have acetaminophen and other ingredients. Using both prescription and OTC acetaminophen for pain can cause you to overdose. Read the labels on your OTC medicines with care. This will help you to clearly know the list of ingredients, how much to take, and any warnings. It may also help you not take too much acetaminophen. If you have questions or do not understand the information, ask your pharmacist or health care provider to explain it to you before you take the OTC medicine.  Managing nausea  Some people have an  upset stomach after surgery. This is often because of anesthesia, pain, or pain medicine, or the stress of surgery. These tips will help you handle nausea and eat healthy foods as you get better. If you were on a special food plan before surgery, ask your doctor if you should follow it while you get better. These tips may help:  Do not push yourself to eat. Your body will tell you when to eat and how much.  Start off with clear liquids and soup. They are easier to digest.  Next try semi-solid foods, such as mashed potatoes, applesauce, and gelatin, as you feel ready.  Slowly move to solid foods. Dont eat fatty, rich, or spicy foods at first.  Do not force yourself to have 3 large meals a day. Instead eat smaller amounts more often.  Take pain medicines with a small amount of solid food, such as crackers or toast, to avoid nausea.     Call your surgeon if  You still have pain an hour after taking medicine. The medicine may not be strong enough.  You feel too sleepy, dizzy, or groggy. The medicine may be too strong.  You have side effects like nausea, vomiting, or skin changes, such as rash, itching, or hives.       If you have obstructive sleep apnea  You were given anesthesia medicine during surgery to keep you comfortable and free of pain. After surgery, you may have more apnea spells because of this medicine and other medicines you were given. The spells may last longer than usual.   At home:  Keep using the continuous positive airway pressure (CPAP) device when you sleep. Unless your health care provider tells you not to, use it when you sleep, day or night. CPAP is a common device used to treat obstructive sleep apnea.  Talk with your provider before taking any pain medicine, muscle relaxants, or sedatives. Your provider will tell you about the possible dangers of taking these medicines.  © 5961-4718 [employer]. [address] [place] [address], [place] [address]. All rights reserved. This information is  not intended as a substitute for professional medical care. Always follow your healthcare professional's instructions.          Using an Incentive Spirometer    An incentive spirometer is a device that helps you do deep breathing exercises. These exercises expand your lungs, aid in circulation, and help prevent pneumonia. Deep breathing exercises also help you breathe better and improve the function of your lungs by:  Keeping your lungs clear  Strengthening your breathing muscles  Helping prevent respiratory complications or problems  The incentive spirometer gives you a way to take an active part in recover. A nurse or therapist will teach you breathing exercises. To do these exercises, you will breathe in through your mouth and not your nose. The incentive spirometer only works correctly if you breathe in through your mouth.  Steps to clear lungs  Step 1. Exhale normally. Then, inhale normally.  Relax and breathe out.  Step 2. Place your lips tightly around the mouthpiece.  Make sure the device is upright and not tilted.  Step 3. Inhale as much air as you can through the mouthpiece (don't breath through your nose).  Inhale slowly and deeply.  Hold your breath long enough to keep the balls or disk raised for at least 3 to 5 seconds, or as instructed by your healthcare provider.  Some spirometers have an indicator to let you know that you are breathing in too fast. If the indicator goes off, breathe in more slowly.  Step 4. Repeat the exercise regularly.  Do this exercise every hour while you're awake, or as instructed by your healthcare provider.  If you were taught deep breathing and coughing exercises, do them regularly as instructed by your healthcare provider.           Post op instructions for prevention of DVT  What is deep vein thrombosis?  Deep vein thrombosis (DVT) is the medical term for blood clots in the deep veins of the leg.  These blood clots can be dangerous.  A DVT can block a blood vessel and keep  blood from getting where it needs to go.  Another problem is that the clot can travel to other parts of the body such as the lungs.  A clot that travels to the lungs is called a pulmonary embolus (PE) and can cause serious problems with breathing which can lead to death.  Am I at risk for DVT/PE?  If you are not very active, you are at risk of DVT.  Anyone confined to bed, sitting for long periods of time, recovering from surgery, etc. increases the risk of DVT.  Other risk factors are cancer diagnosis, certain medications, estrogen replacement in any form,older age, obesity, pregnancy, smoking, history of clotting disorders, and dehydration.  How will I know if I have a DVT?  Swelling in the lower leg  Pain  Warmth, redness, hardness or bulging of the vein  If you have any of these symptoms, call your doctors office right away.  Some people will not have any symptoms until the clot moves to the lungs.  What are the symptoms of a PE?  Panting, shortness of breath, or trouble breathing  Sharp, knife-like chest pain when you breathe  Coughing or coughing up blood  Rapid heartbeat  If you have any of these symptoms or get worse quickly, call 911 for emergency treatment.  How can I prevent a DVT?  Avoid long periods of inactivity and dont cross your legs--get up and walk around every hour or so.  Stay active--walking after surgery is highly encouraged.  This means you should get out of the house and walk in the neighborhood.  Going up and down stairs will not impair healing (unless advised against such activity by your doctor).    Drink plenty of noncaffeinated, nonalcoholic fluids each day to prevent dehydration.  Wear special support stockings, if they have been advised by your doctor.  If you travel, stop at least once an hour and walk around.  Avoid smoking (assistance with stopping is available through your healthcare provider)  Always notify your doctor if you are not able to follow the post operative  instructions that are given to you at the time of discharge.  It may be necessary to prescribe one of the medications available to prevent DVT.          We hope your stay was comfortable as you heal now, mend and rest.    For we have enjoyed taking care of you by giving your our best.    And as you get better, by regaining your health and strength;   We count it as a privilege to have served you and hope your time at [facility] was well spent.      Thank  You!!!

## 2025-04-03 NOTE — PLAN OF CARE
Patient awake, alert, oriented. Vital signs stable. Patient verbalizes readiness for discharge. Discharge instructions reviewed with patient and patient's daughter. Patient and patient's daughter verbalized understanding. IV removed, catheter intact. Dressing clean, dry, and intact. All belongings returned to patient. Patient transferred to car via wheelchair. Safety maintained.

## 2025-04-03 NOTE — ANESTHESIA PREPROCEDURE EVALUATION
04/03/2025  Rosa Aceves is a 84 y.o., female.      Pre-op Assessment          Review of Systems  Cardiovascular:     Hypertension   CAD                    Coronary Artery Disease:                            Hypertension         Renal/:  Chronic Renal Disease        Kidney Function/Disease                 Physical Exam  General: Well nourished        Anesthesia Plan  Type of Anesthesia, risks & benefits discussed:    Anesthesia Type: Gen Natural Airway, Gen Supraglottic Airway, MAC  Intra-op Monitoring Plan: Standard ASA Monitors  Post Op Pain Control Plan: multimodal analgesia and IV/PO Opioids PRN  Induction:  IV  Informed Consent: Informed consent signed with the Patient and all parties understand the risks and agree with anesthesia plan.  All questions answered.   ASA Score: 3    Ready For Surgery From Anesthesia Perspective.     .

## 2025-04-04 VITALS
SYSTOLIC BLOOD PRESSURE: 150 MMHG | WEIGHT: 205 LBS | BODY MASS INDEX: 33.59 KG/M2 | HEART RATE: 64 BPM | RESPIRATION RATE: 16 BRPM | TEMPERATURE: 98 F | DIASTOLIC BLOOD PRESSURE: 68 MMHG | OXYGEN SATURATION: 95 %

## 2025-04-08 ENCOUNTER — TELEPHONE (OUTPATIENT)
Dept: SURGERY | Facility: CLINIC | Age: 85
End: 2025-04-08
Payer: MEDICARE

## 2025-04-08 NOTE — TELEPHONE ENCOUNTER
States that she did not have to take any pain medication, she removed the bandage and everything looks fine.  She doesn't feel like she needs to come back in.  Advised to call us if anything changes.

## 2025-04-08 NOTE — TELEPHONE ENCOUNTER
----- Message from Letha sent at 4/8/2025 10:50 AM CDT -----  Contact: pt [phone]  Type:  Sooner Appointment RequestCaller is requesting a sooner appointment.  Caller declined first available appointment listed below.  Caller will not accept being placed on the waitlist and is requesting a message be sent to doctor.Name of Caller:  Pt When is the first available appointment?  May 12Symptoms:  port removal -2wk  f/u Would the patient rather a call back or a response via [facility]? Call Best Call Back Number:  [phone] Information:  Pt stated she was told to rosa maria lfor a 2 wk f/u but she stated she feels fine she has no issues w/ area where port was removed and has had no pain. Pls call back and advise

## 2025-04-29 ENCOUNTER — OFFICE VISIT (OUTPATIENT)
Dept: PODIATRY | Facility: CLINIC | Age: 85
End: 2025-04-29
Payer: MEDICARE

## 2025-04-29 VITALS
SYSTOLIC BLOOD PRESSURE: 134 MMHG | HEIGHT: 66 IN | HEART RATE: 75 BPM | WEIGHT: 205 LBS | DIASTOLIC BLOOD PRESSURE: 64 MMHG | BODY MASS INDEX: 32.95 KG/M2

## 2025-04-29 DIAGNOSIS — L97.511 ULCER OF BOTH FEET, LIMITED TO BREAKDOWN OF SKIN: ICD-10-CM

## 2025-04-29 DIAGNOSIS — M20.41 HAMMER TOES OF BOTH FEET: ICD-10-CM

## 2025-04-29 DIAGNOSIS — M20.42 HAMMER TOES OF BOTH FEET: ICD-10-CM

## 2025-04-29 DIAGNOSIS — L60.0 INGROWN NAIL: Primary | ICD-10-CM

## 2025-04-29 DIAGNOSIS — L97.521 ULCER OF BOTH FEET, LIMITED TO BREAKDOWN OF SKIN: ICD-10-CM

## 2025-04-29 PROCEDURE — 3078F DIAST BP <80 MM HG: CPT | Mod: CPTII,S$GLB,, | Performed by: PODIATRIST

## 2025-04-29 PROCEDURE — 99213 OFFICE O/P EST LOW 20 MIN: CPT | Mod: S$GLB,,, | Performed by: PODIATRIST

## 2025-04-29 PROCEDURE — 3288F FALL RISK ASSESSMENT DOCD: CPT | Mod: CPTII,S$GLB,, | Performed by: PODIATRIST

## 2025-04-29 PROCEDURE — 99999 PR PBB SHADOW E&M-EST. PATIENT-LVL IV: CPT | Mod: PBBFAC,,, | Performed by: PODIATRIST

## 2025-04-29 PROCEDURE — 3075F SYST BP GE 130 - 139MM HG: CPT | Mod: CPTII,S$GLB,, | Performed by: PODIATRIST

## 2025-04-29 PROCEDURE — 1160F RVW MEDS BY RX/DR IN RCRD: CPT | Mod: CPTII,S$GLB,, | Performed by: PODIATRIST

## 2025-04-29 PROCEDURE — 1101F PT FALLS ASSESS-DOCD LE1/YR: CPT | Mod: CPTII,S$GLB,, | Performed by: PODIATRIST

## 2025-04-29 PROCEDURE — 1125F AMNT PAIN NOTED PAIN PRSNT: CPT | Mod: CPTII,S$GLB,, | Performed by: PODIATRIST

## 2025-04-29 PROCEDURE — 1159F MED LIST DOCD IN RCRD: CPT | Mod: CPTII,S$GLB,, | Performed by: PODIATRIST

## 2025-04-30 NOTE — PROGRESS NOTES
Subjective:       Patient ID: Rosa Aceves is a 84 y.o. female.    Chief Complaint: Foot Pain and Ingrown Toenail  Patient presents today she is complaining about painfully ingrown toenails on both feet she also has severe hammertoes her toes are rubbing together and causing irritation patient was treated for breast cancer in May of this year.  Patient also states she has some new calluses that have formed from some new shoes that were too tight and rubbing her toes.    Past Medical History:   Diagnosis Date    Aortic valve regurgitation     Benign paroxysmal vertigo, bilateral     Chronic kidney disease, unspecified     Coronary artery disease     Essential (primary) hypertension     Malignant neoplasm of right breast     Mitral valve regurgitation     Obesity, unspecified     Osteopenia     Positive colorectal cancer screening using Cologuard test     Rheumatoid arthritis, unspecified     Ruptured lumbar disc     Unspecified cataract      Past Surgical History:   Procedure Laterality Date    BREAST BIOPSY Left     BREAST SURGERY Left     CHOLECYSTECTOMY      EYE SURGERY      INJECTION FOR SENTINEL NODE IDENTIFICATION Left 5/26/2022    Procedure: INJECTION, FOR SENTINEL NODE IDENTIFICATION;  Surgeon: Renzo Jim MD;  Location: [facility] OR;  Service: General;  Laterality: Left;    INSERTION OF TUNNELED CENTRAL VENOUS CATHETER (CVC) WITH SUBCUTANEOUS PORT N/A 8/1/2022    Procedure: [id number];  Surgeon: Renzo Jim MD;  Location: [facility] OR;  Service: General;  Laterality: N/A;    MASTECTOMY Left 5/26/2022    Procedure: MASTECTOMY;  Surgeon: Renzo Jim MD;  Location: [facility] OR;  Service: General;  Laterality: Left;    MASTECTOMY, RADICAL Right     REMOVAL OF TUNNELED CENTRAL VENOUS CATHETER (CVC) Right 4/3/2025    Procedure: REMOVAL, CATHETER, CENTRAL VENOUS, TUNNELED;  Surgeon: Rnezo Jim MD;  Location: [facility] OR;  Service: General;  Laterality: Right;    SENTINEL LYMPH NODE  BIOPSY Left 5/26/2022    Procedure: BIOPSY, LYMPH NODE, SENTINEL;  Surgeon: Renzo Jim MD;  Location: [facility];  Service: General;  Laterality: Left;    TUBAL LIGATION       Family History   Problem Relation Name Age of Onset    Cancer Mother      Heart failure Father      Cancer Sister      Cancer Grandchild       Social History     Socioeconomic History    Marital status:     Number of children: 5   Tobacco Use    Smoking status: Never    Smokeless tobacco: Never   Substance and Sexual Activity    Alcohol use: Never    Drug use: Never    Sexual activity: Not Currently     Partners: Male     Social Drivers of Health     Financial Resource Strain: Low Risk  (3/11/2025)    Overall Financial Resource Strain (CARDIA)     Difficulty of Paying Living Expenses: Not very hard   Food Insecurity: No Food Insecurity (3/11/2025)    Hunger Vital Sign     Worried About Running Out of Food in the Last Year: Never true     Ran Out of Food in the Last Year: Never true   Transportation Needs: No Transportation Needs (3/11/2025)    PRAPARE - Transportation     Lack of Transportation (Medical): No     Lack of Transportation (Non-Medical): No   Physical Activity: Inactive (3/11/2025)    Exercise Vital Sign     Days of Exercise per Week: 0 days     Minutes of Exercise per Session: 0 min   Stress: No Stress Concern Present (3/11/2025)    [employer] [place] [employer] - Occupational Stress Questionnaire     Feeling of Stress : Not at all   Housing Stability: Unknown (3/11/2025)    Housing Stability Vital Sign     Unable to Pay for Housing in the Last Year: Patient declined     Number of Times Moved in the Last Year: 0     Homeless in the Last Year: No       Current Outpatient Medications   Medication Sig Dispense Refill    acetaminophen (TYLENOL) 500 MG tablet Take 500 mg by mouth every 6 (six) hours as needed.      diazePAM (VALIUM) 2 MG tablet Take 2 mg by mouth.      furosemide (LASIX) 40 MG tablet Take 20 mg  "by mouth daily as needed.      hydrALAZINE (APRESOLINE) 50 MG tablet Take 50 mg by mouth 2 (two) times daily.      lisinopriL (PRINIVIL,ZESTRIL) 40 MG tablet Take 40 mg by mouth.      NIFEdipine (ADALAT CC) 90 MG TbSR Take 120 mg by mouth once daily.      HYDROcodone-acetaminophen (NORCO) 5-325 mg per tablet Take 1 tablet by mouth every 6 (six) hours as needed for Pain. (Patient not taking: Reported on 4/29/2025) 10 tablet 0     No current facility-administered medications for this visit.     Facility-Administered Medications Ordered in Other Visits   Medication Dose Route Frequency Provider Last Rate Last Admin    electrolyte-S (ISOLYTE)   Intravenous Continuous Irwin Russo MD   New Bag at 04/03/25 0846    fentaNYL 50 mcg/mL injection 25 mcg  25 mcg Intravenous Q5 Min PRN Irwin Russo MD        HYDROmorphone (PF) injection 0.2 mg  0.2 mg Intravenous Q5 Min PRN Irwin Russo MD        lactated ringers infusion   Intravenous Continuous Irwin Russo MD 10 mL/hr at 08/01/22 1120 Restarted at 08/01/22 1234    LIDOcaine (PF) 10 mg/ml (1%) injection 10 mg  1 mL Intradermal Once Irwin Russo MD        oxyCODONE immediate release tablet 5 mg  5 mg Oral Q3H PRN Irwin Russo MD         Review of patient's allergies indicates:  No Known Allergies    Review of Systems   Musculoskeletal:  Positive for arthralgias.   All other systems reviewed and are negative.      Objective:      Vitals:    04/29/25 1148   BP: 134/64   Pulse: 75   Weight: 93 kg (205 lb 0.4 oz)   Height: 5' 5.5" (1.664 m)     Physical Exam  Vitals and nursing note reviewed. Exam conducted with a chaperone present.   Constitutional:       Appearance: Normal appearance.   Cardiovascular:      Pulses:           Dorsalis pedis pulses are 1+ on the right side and 1+ on the left side.        Posterior tibial pulses are 0 on the right side and 0 on the left side.   Pulmonary:      Effort: Pulmonary effort is normal.   Musculoskeletal:         " General: Tenderness and deformity present.   Feet:      Right foot:      Protective Sensation: 2 sites tested.  2 sites sensed.      Skin integrity: Erythema present.      Toenail Condition: Right toenails are abnormally thick, long and ingrown. Fungal disease present.     Left foot:      Protective Sensation: 2 sites tested.  2 sites sensed.      Skin integrity: Erythema present.      Toenail Condition: Left toenails are abnormally thick, long and ingrown. Fungal disease present.  Skin:     General: Skin is warm.      Capillary Refill: Capillary refill takes more than 3 seconds.      Findings: Erythema present.   Neurological:      General: No focal deficit present.      Mental Status: She is alert.   Psychiatric:         Mood and Affect: Mood normal.         Behavior: Behavior normal.                                                          Assessment:       1. Ingrown nail    2. Hammer toes of both feet    3. Ulcer of both feet, limited to breakdown of skin          Plan:       Patient presents today complaining about painfully ingrown toenails on both feet she also has severe hammertoes her toes are rubbing together and causing irritation patient was treated for breast cancer in May of this year.  Patient also states she has some new calluses that have formed from some new shoes that were too tight and rubbing her toes.Patient's family member states that they had no idea that the patient's toenails were so long and so ingrown.   Patient had severely elongated ingrowing toenails on multiple digits bilateral while the patient did not need a nail avulsion I was able to aggressively trim and remove the ingrowing toenails which the patient tolerated well patient noted to me to be immediate relief upon removing the ingrown toenail this was especially noticeable on the bilateral hallux.  Patient does have irritation 5th digit left and needs to make sure she is wearing shoes that accommodate for the digital contracture  this irritation could certainly lead to breakdown ulceration which can lead to complication with the patient's peripheral vascular disease.  Patient had several new areas of pre ulcerative callus formation the most significant was overlying the lateral aspect of the 5th metatarsal phalangeal joint right I did debride this area there is a little bit of a sore underlying this area and I advised the patient this needs to be monitored closely but should resolve as long as no further irritation presents over the area patient also has hyperkeratotic lesion sub 5th metatarsal left all of this is related to the patient's new shoes that were too narrow rubbing and irritating these areas she states she is no longer wearing the shoes and now that she is not wearing the shoes these areas are not as tender as they had been each of these areas were non excisionally debrided.  Recommended follow-up will be as needed if the patient is not getting complete relief of the ingrowing toenails in the next 4-5 days to contact us for follow-up further evaluation treatment but I do recommend keeping the patient's skin well moisturized well hydrated on a daily basis.  Total time including discussion evaluation treatment discussion of treatment options treatment plan removal of ingrown toenails as well as comprehensive evaluation equaled 25 minutes.  The area of greatest concern was the medial border right hallux which was very deep and ingrown and just started to bother the patient about 3 days ago.  Patient states she has been having a lot of problems with her blood pressure she has not been feeling very well her blood pressure has been staying significantly elevated even with new blood pressure medication.  This note was created using [employer] voice recognition software that occasionally misinterpreted phrases or words.

## 2025-05-29 ENCOUNTER — TELEPHONE (OUTPATIENT)
Dept: PODIATRY | Facility: CLINIC | Age: 85
End: 2025-05-29
Payer: MEDICARE

## 2025-06-17 ENCOUNTER — OFFICE VISIT (OUTPATIENT)
Dept: PODIATRY | Facility: CLINIC | Age: 85
End: 2025-06-17
Payer: MEDICARE

## 2025-06-17 VITALS
HEART RATE: 86 BPM | DIASTOLIC BLOOD PRESSURE: 74 MMHG | WEIGHT: 205 LBS | HEIGHT: 66 IN | BODY MASS INDEX: 32.95 KG/M2 | SYSTOLIC BLOOD PRESSURE: 157 MMHG

## 2025-06-17 DIAGNOSIS — M20.42 HAMMER TOES OF BOTH FEET: ICD-10-CM

## 2025-06-17 DIAGNOSIS — M20.41 HAMMER TOES OF BOTH FEET: ICD-10-CM

## 2025-06-17 DIAGNOSIS — L97.521 ULCER OF BOTH FEET, LIMITED TO BREAKDOWN OF SKIN: ICD-10-CM

## 2025-06-17 DIAGNOSIS — L60.0 INGROWN NAIL: Primary | ICD-10-CM

## 2025-06-17 DIAGNOSIS — L97.511 ULCER OF BOTH FEET, LIMITED TO BREAKDOWN OF SKIN: ICD-10-CM

## 2025-06-17 PROCEDURE — 1160F RVW MEDS BY RX/DR IN RCRD: CPT | Mod: CPTII,S$GLB,, | Performed by: PODIATRIST

## 2025-06-17 PROCEDURE — 3078F DIAST BP <80 MM HG: CPT | Mod: CPTII,S$GLB,, | Performed by: PODIATRIST

## 2025-06-17 PROCEDURE — 99999 PR PBB SHADOW E&M-EST. PATIENT-LVL IV: CPT | Mod: PBBFAC,,, | Performed by: PODIATRIST

## 2025-06-17 PROCEDURE — 1159F MED LIST DOCD IN RCRD: CPT | Mod: CPTII,S$GLB,, | Performed by: PODIATRIST

## 2025-06-17 PROCEDURE — 3077F SYST BP >= 140 MM HG: CPT | Mod: CPTII,S$GLB,, | Performed by: PODIATRIST

## 2025-06-17 PROCEDURE — 1125F AMNT PAIN NOTED PAIN PRSNT: CPT | Mod: CPTII,S$GLB,, | Performed by: PODIATRIST

## 2025-06-17 PROCEDURE — 3288F FALL RISK ASSESSMENT DOCD: CPT | Mod: CPTII,S$GLB,, | Performed by: PODIATRIST

## 2025-06-17 PROCEDURE — 99213 OFFICE O/P EST LOW 20 MIN: CPT | Mod: S$GLB,,, | Performed by: PODIATRIST

## 2025-06-17 PROCEDURE — 1101F PT FALLS ASSESS-DOCD LE1/YR: CPT | Mod: CPTII,S$GLB,, | Performed by: PODIATRIST

## 2025-06-19 NOTE — PROGRESS NOTES
Subjective:       Patient ID: Rosa Aceves is a 84 y.o. female.    Chief Complaint: Ingrown Toenail  Patient presents today she is complaining about painfully ingrown toenails on both feet she also has severe hammertoes her toes are rubbing together and causing irritation patient was treated for breast cancer in May of this year.  Patient also states she has some new calluses that have formed from some new shoes that were too tight and rubbing her toes.    Past Medical History:   Diagnosis Date    Aortic valve regurgitation     Benign paroxysmal vertigo, bilateral     Chronic kidney disease, unspecified     Coronary artery disease     Essential (primary) hypertension     Malignant neoplasm of right breast     Mitral valve regurgitation     Obesity, unspecified     Osteopenia     Positive colorectal cancer screening using Cologuard test     Rheumatoid arthritis, unspecified     Ruptured lumbar disc     Unspecified cataract      Past Surgical History:   Procedure Laterality Date    BREAST BIOPSY Left     BREAST SURGERY Left     CHOLECYSTECTOMY      EYE SURGERY      INJECTION FOR SENTINEL NODE IDENTIFICATION Left 5/26/2022    Procedure: INJECTION, FOR SENTINEL NODE IDENTIFICATION;  Surgeon: Renzo Jim MD;  Location: [facility] OR;  Service: General;  Laterality: Left;    INSERTION OF TUNNELED CENTRAL VENOUS CATHETER (CVC) WITH SUBCUTANEOUS PORT N/A 8/1/2022    Procedure: [id number];  Surgeon: Renzo Jim MD;  Location: [facility] OR;  Service: General;  Laterality: N/A;    MASTECTOMY Left 5/26/2022    Procedure: MASTECTOMY;  Surgeon: Renzo Jim MD;  Location: [facility] OR;  Service: General;  Laterality: Left;    MASTECTOMY, RADICAL Right     REMOVAL OF TUNNELED CENTRAL VENOUS CATHETER (CVC) Right 4/3/2025    Procedure: REMOVAL, CATHETER, CENTRAL VENOUS, TUNNELED;  Surgeon: Renzo Jim MD;  Location: [facility] OR;  Service: General;  Laterality: Right;    SENTINEL LYMPH NODE BIOPSY Left  5/26/2022    Procedure: BIOPSY, LYMPH NODE, SENTINEL;  Surgeon: Renzo Jim MD;  Location: [facility];  Service: General;  Laterality: Left;    TUBAL LIGATION       Family History   Problem Relation Name Age of Onset    Cancer Mother      Heart failure Father      Cancer Sister      Cancer Grandchild       Social History     Socioeconomic History    Marital status:     Number of children: 5   Tobacco Use    Smoking status: Never    Smokeless tobacco: Never   Substance and Sexual Activity    Alcohol use: Never    Drug use: Never    Sexual activity: Not Currently     Partners: Male     Social Drivers of Health     Financial Resource Strain: Low Risk  (3/11/2025)    Overall Financial Resource Strain (CARDIA)     Difficulty of Paying Living Expenses: Not very hard   Food Insecurity: No Food Insecurity (3/11/2025)    Hunger Vital Sign     Worried About Running Out of Food in the Last Year: Never true     Ran Out of Food in the Last Year: Never true   Transportation Needs: No Transportation Needs (3/11/2025)    PRAPARE - Transportation     Lack of Transportation (Medical): No     Lack of Transportation (Non-Medical): No   Physical Activity: Inactive (3/11/2025)    Exercise Vital Sign     Days of Exercise per Week: 0 days     Minutes of Exercise per Session: 0 min   Stress: No Stress Concern Present (3/11/2025)    [employer] [place] [employer] - Occupational Stress Questionnaire     Feeling of Stress : Not at all   Housing Stability: Unknown (3/11/2025)    Housing Stability Vital Sign     Unable to Pay for Housing in the Last Year: Patient declined     Number of Times Moved in the Last Year: 0     Homeless in the Last Year: No       Current Outpatient Medications   Medication Sig Dispense Refill    acetaminophen (TYLENOL) 500 MG tablet Take 500 mg by mouth every 6 (six) hours as needed.      furosemide (LASIX) 40 MG tablet Take 20 mg by mouth daily as needed.      hydrALAZINE (APRESOLINE) 50 MG  "tablet Take 50 mg by mouth 2 (two) times daily.      HYDROcodone-acetaminophen (NORCO) 5-325 mg per tablet Take 1 tablet by mouth every 6 (six) hours as needed for Pain. 10 tablet 0    lisinopriL (PRINIVIL,ZESTRIL) 40 MG tablet Take 40 mg by mouth.      NIFEdipine (ADALAT CC) 90 MG TbSR Take 120 mg by mouth once daily.      diazePAM (VALIUM) 2 MG tablet Take 2 mg by mouth.       No current facility-administered medications for this visit.     Facility-Administered Medications Ordered in Other Visits   Medication Dose Route Frequency Provider Last Rate Last Admin    electrolyte-S (ISOLYTE)   Intravenous Continuous Irwin Rsuso MD   New Bag at 04/03/25 0846    fentaNYL 50 mcg/mL injection 25 mcg  25 mcg Intravenous Q5 Min PRN Irwin Russo MD        HYDROmorphone (PF) injection 0.2 mg  0.2 mg Intravenous Q5 Min PRN Irwin Russo MD        lactated ringers infusion   Intravenous Continuous Irwin Russo MD 10 mL/hr at 08/01/22 1120 Restarted at 08/01/22 1234    LIDOcaine (PF) 10 mg/ml (1%) injection 10 mg  1 mL Intradermal Once Irwin Russo MD        oxyCODONE immediate release tablet 5 mg  5 mg Oral Q3H PRN Irwin Russo MD         Review of patient's allergies indicates:  No Known Allergies    Review of Systems   Musculoskeletal:  Positive for arthralgias.   All other systems reviewed and are negative.      Objective:      Vitals:    06/17/25 1143   BP: (!) 157/74   Pulse: 86   Weight: 93 kg (205 lb 0.4 oz)   Height: 5' 5.5" (1.664 m)     Physical Exam  Vitals and nursing note reviewed. Exam conducted with a chaperone present.   Constitutional:       Appearance: Normal appearance.   Cardiovascular:      Pulses:           Dorsalis pedis pulses are 1+ on the right side and 1+ on the left side.        Posterior tibial pulses are 0 on the right side and 0 on the left side.   Pulmonary:      Effort: Pulmonary effort is normal.   Musculoskeletal:         General: Tenderness and deformity present. "   Feet:      Right foot:      Protective Sensation: 2 sites tested.  2 sites sensed.      Skin integrity: Erythema present.      Toenail Condition: Right toenails are abnormally thick, long and ingrown. Fungal disease present.     Left foot:      Protective Sensation: 2 sites tested.  2 sites sensed.      Skin integrity: Erythema present.      Toenail Condition: Left toenails are abnormally thick, long and ingrown. Fungal disease present.  Skin:     General: Skin is warm.      Capillary Refill: Capillary refill takes more than 3 seconds.      Findings: Erythema present.   Neurological:      General: No focal deficit present.      Mental Status: She is alert.   Psychiatric:         Mood and Affect: Mood normal.         Behavior: Behavior normal.                                                                                Assessment:       1. Ingrown nail    2. Ulcer of both feet, limited to breakdown of skin    3. Hammer toes of both feet          Plan:       Patient presents today complaining about painfully ingrown toenails on both feet she also has severe hammertoes her toes are rubbing together and causing irritation patient was treated for breast cancer in May of this year.  Patient also states she has some new calluses that have formed from some new shoes that were too tight and rubbing her toes.Patient's family member states that they had no idea that the patient's toenails were so long and so ingrown.   Patient had severely elongated ingrowing toenails on multiple digits bilateral while the patient did not need a nail avulsion I was able to aggressively trim and remove the ingrowing toenails which the patient tolerated well patient noted to me to be immediate relief upon removing the ingrown toenail this was especially noticeable on the bilateral hallux.  Patient does have irritation 5th digit left and needs to make sure she is wearing shoes that accommodate for the digital contracture this irritation could  certainly lead to breakdown ulceration which can lead to complication with the patient's peripheral vascular disease.  Patient had several new areas of pre ulcerative callus formation the most significant was overlying the lateral aspect of the 5th metatarsal phalangeal joint right I did debride this area there is a little bit of a sore underlying this area and I advised the patient this needs to be monitored closely but should resolve as long as no further irritation presents over the area patient also has hyperkeratotic lesion sub 5th metatarsal left all of this is related to the patient's new shoes that were too narrow rubbing and irritating these areas she states she is no longer wearing the shoes and now that she is not wearing the shoes these areas are not as tender as they had been each of these areas were non excisionally debrided.  Recommended follow-up will be as needed if the patient is not getting complete relief of the ingrowing toenails in the next 4-5 days to contact us for follow-up further evaluation treatment but I do recommend keeping the patient's skin well moisturized well hydrated on a daily basis.  Total time including discussion evaluation treatment discussion of treatment options treatment plan removal of ingrown toenails as well as comprehensive evaluation equaled 25 minutes.  The area of greatest concern was the medial border right hallux which was very deep and ingrown and just started to bother the patient about 4 days ago.  Patient has been having a lot of cramping in her legs and feet I advised her that I would recommend the use of a magnesium cream this is especially bothersome in the patient's right foot.  Patient advised the magnesium cream has been very helpful at a dressing cramping in the lower extremities when applied especially before bed.  Patient states she is willing to try anything at this point because the cramping has been very bothersome.  This note was created using  [employer] voice recognition software that occasionally misinterpreted phrases or words.

## 2025-07-29 ENCOUNTER — OFFICE VISIT (OUTPATIENT)
Dept: PODIATRY | Facility: CLINIC | Age: 85
End: 2025-07-29
Payer: MEDICARE

## 2025-07-29 VITALS
HEART RATE: 77 BPM | DIASTOLIC BLOOD PRESSURE: 72 MMHG | SYSTOLIC BLOOD PRESSURE: 151 MMHG | BODY MASS INDEX: 32.95 KG/M2 | WEIGHT: 205 LBS | HEIGHT: 66 IN

## 2025-07-29 DIAGNOSIS — M20.42 HAMMER TOES OF BOTH FEET: ICD-10-CM

## 2025-07-29 DIAGNOSIS — L97.511 ULCER OF BOTH FEET, LIMITED TO BREAKDOWN OF SKIN: ICD-10-CM

## 2025-07-29 DIAGNOSIS — L97.521 ULCER OF BOTH FEET, LIMITED TO BREAKDOWN OF SKIN: ICD-10-CM

## 2025-07-29 DIAGNOSIS — L60.0 INGROWN NAIL: Primary | ICD-10-CM

## 2025-07-29 DIAGNOSIS — M20.41 HAMMER TOES OF BOTH FEET: ICD-10-CM

## 2025-07-29 PROCEDURE — 1126F AMNT PAIN NOTED NONE PRSNT: CPT | Mod: CPTII,S$GLB,, | Performed by: PODIATRIST

## 2025-07-29 PROCEDURE — 99213 OFFICE O/P EST LOW 20 MIN: CPT | Mod: S$GLB,,, | Performed by: PODIATRIST

## 2025-07-29 PROCEDURE — 3288F FALL RISK ASSESSMENT DOCD: CPT | Mod: CPTII,S$GLB,, | Performed by: PODIATRIST

## 2025-07-29 PROCEDURE — 3078F DIAST BP <80 MM HG: CPT | Mod: CPTII,S$GLB,, | Performed by: PODIATRIST

## 2025-07-29 PROCEDURE — 1159F MED LIST DOCD IN RCRD: CPT | Mod: CPTII,S$GLB,, | Performed by: PODIATRIST

## 2025-07-29 PROCEDURE — 99999 PR PBB SHADOW E&M-EST. PATIENT-LVL IV: CPT | Mod: PBBFAC,,, | Performed by: PODIATRIST

## 2025-07-29 PROCEDURE — 3077F SYST BP >= 140 MM HG: CPT | Mod: CPTII,S$GLB,, | Performed by: PODIATRIST

## 2025-07-29 PROCEDURE — 1160F RVW MEDS BY RX/DR IN RCRD: CPT | Mod: CPTII,S$GLB,, | Performed by: PODIATRIST

## 2025-07-29 PROCEDURE — 1101F PT FALLS ASSESS-DOCD LE1/YR: CPT | Mod: CPTII,S$GLB,, | Performed by: PODIATRIST

## 2025-07-31 NOTE — PROGRESS NOTES
Subjective:       Patient ID: Rosa Aceves is a 84 y.o. female.    Chief Complaint: Ingrown Toenail  Patient presents today she is complaining about painfully ingrown toenails on both feet she also has severe hammertoes her toes are rubbing together and causing irritation patient was treated for breast cancer in May of this year.  Patient also states she has some new calluses that have formed from some new shoes that were too tight and rubbing her toes.    Past Medical History:   Diagnosis Date    Aortic valve regurgitation     Benign paroxysmal vertigo, bilateral     Chronic kidney disease, unspecified     Coronary artery disease     Essential (primary) hypertension     Malignant neoplasm of right breast     Mitral valve regurgitation     Obesity, unspecified     Osteopenia     Positive colorectal cancer screening using Cologuard test     Rheumatoid arthritis, unspecified     Ruptured lumbar disc     Unspecified cataract      Past Surgical History:   Procedure Laterality Date    BREAST BIOPSY Left     BREAST SURGERY Left     CHOLECYSTECTOMY      EYE SURGERY      INJECTION FOR SENTINEL NODE IDENTIFICATION Left 5/26/2022    Procedure: INJECTION, FOR SENTINEL NODE IDENTIFICATION;  Surgeon: Renzo Jim MD;  Location: [facility] OR;  Service: General;  Laterality: Left;    INSERTION OF TUNNELED CENTRAL VENOUS CATHETER (CVC) WITH SUBCUTANEOUS PORT N/A 8/1/2022    Procedure: [id number];  Surgeon: Renzo Jim MD;  Location: [facility] OR;  Service: General;  Laterality: N/A;    MASTECTOMY Left 5/26/2022    Procedure: MASTECTOMY;  Surgeon: Renzo Jim MD;  Location: [facility] OR;  Service: General;  Laterality: Left;    MASTECTOMY, RADICAL Right     REMOVAL OF TUNNELED CENTRAL VENOUS CATHETER (CVC) Right 4/3/2025    Procedure: REMOVAL, CATHETER, CENTRAL VENOUS, TUNNELED;  Surgeon: Renzo Jim MD;  Location: [facility] OR;  Service: General;  Laterality: Right;    SENTINEL LYMPH NODE BIOPSY Left  5/26/2022    Procedure: BIOPSY, LYMPH NODE, SENTINEL;  Surgeon: Renzo Jim MD;  Location: [facility];  Service: General;  Laterality: Left;    TUBAL LIGATION       Family History   Problem Relation Name Age of Onset    Cancer Mother      Heart failure Father      Cancer Sister      Cancer Grandchild       Social History     Socioeconomic History    Marital status:     Number of children: 5   Tobacco Use    Smoking status: Never    Smokeless tobacco: Never   Substance and Sexual Activity    Alcohol use: Never    Drug use: Never    Sexual activity: Not Currently     Partners: Male     Social Drivers of Health     Financial Resource Strain: Low Risk  (3/11/2025)    Overall Financial Resource Strain (CARDIA)     Difficulty of Paying Living Expenses: Not very hard   Food Insecurity: No Food Insecurity (3/11/2025)    Hunger Vital Sign     Worried About Running Out of Food in the Last Year: Never true     Ran Out of Food in the Last Year: Never true   Transportation Needs: No Transportation Needs (3/11/2025)    PRAPARE - Transportation     Lack of Transportation (Medical): No     Lack of Transportation (Non-Medical): No   Physical Activity: Inactive (3/11/2025)    Exercise Vital Sign     Days of Exercise per Week: 0 days     Minutes of Exercise per Session: 0 min   Stress: No Stress Concern Present (3/11/2025)    [employer] [place] [employer] - Occupational Stress Questionnaire     Feeling of Stress : Not at all   Housing Stability: Unknown (3/11/2025)    Housing Stability Vital Sign     Unable to Pay for Housing in the Last Year: Patient declined     Number of Times Moved in the Last Year: 0     Homeless in the Last Year: No       Current Outpatient Medications   Medication Sig Dispense Refill    acetaminophen (TYLENOL) 500 MG tablet Take 500 mg by mouth every 6 (six) hours as needed.      furosemide (LASIX) 40 MG tablet Take 20 mg by mouth daily as needed.      hydrALAZINE (APRESOLINE) 50 MG  "tablet Take 50 mg by mouth 2 (two) times daily.      HYDROcodone-acetaminophen (NORCO) 5-325 mg per tablet Take 1 tablet by mouth every 6 (six) hours as needed for Pain. 10 tablet 0    lisinopriL (PRINIVIL,ZESTRIL) 40 MG tablet Take 40 mg by mouth.      NIFEdipine (ADALAT CC) 90 MG TbSR Take 120 mg by mouth once daily.      diazePAM (VALIUM) 2 MG tablet Take 2 mg by mouth.       No current facility-administered medications for this visit.     Facility-Administered Medications Ordered in Other Visits   Medication Dose Route Frequency Provider Last Rate Last Admin    electrolyte-S (ISOLYTE)   Intravenous Continuous Irwin Russo MD   New Bag at 04/03/25 0846    fentaNYL 50 mcg/mL injection 25 mcg  25 mcg Intravenous Q5 Min PRN Irwin Russo MD        HYDROmorphone (PF) injection 0.2 mg  0.2 mg Intravenous Q5 Min PRN Irwin Russo MD        lactated ringers infusion   Intravenous Continuous Irwin Russo MD 10 mL/hr at 08/01/22 1120 Restarted at 08/01/22 1234    LIDOcaine (PF) 10 mg/ml (1%) injection 10 mg  1 mL Intradermal Once Irwin Russo MD        oxyCODONE immediate release tablet 5 mg  5 mg Oral Q3H PRN Irwin Russo MD         Review of patient's allergies indicates:  No Known Allergies    Review of Systems   Musculoskeletal:  Positive for arthralgias.   All other systems reviewed and are negative.      Objective:      Vitals:    07/29/25 1143   BP: (!) 151/72   Pulse: 77   Weight: 93 kg (205 lb 0.4 oz)   Height: 5' 5.5" (1.664 m)     Physical Exam  Vitals and nursing note reviewed. Exam conducted with a chaperone present.   Constitutional:       Appearance: Normal appearance.   Cardiovascular:      Pulses:           Dorsalis pedis pulses are 1+ on the right side and 1+ on the left side.        Posterior tibial pulses are 0 on the right side and 0 on the left side.   Pulmonary:      Effort: Pulmonary effort is normal.   Musculoskeletal:         General: Tenderness and deformity present. "   Feet:      Right foot:      Protective Sensation: 2 sites tested.  2 sites sensed.      Skin integrity: Erythema present.      Toenail Condition: Right toenails are abnormally thick, long and ingrown. Fungal disease present.     Left foot:      Protective Sensation: 2 sites tested.  2 sites sensed.      Skin integrity: Erythema present.      Toenail Condition: Left toenails are abnormally thick, long and ingrown. Fungal disease present.  Skin:     General: Skin is warm.      Capillary Refill: Capillary refill takes more than 3 seconds.      Findings: Erythema present.   Neurological:      General: No focal deficit present.      Mental Status: She is alert.   Psychiatric:         Mood and Affect: Mood normal.         Behavior: Behavior normal.                                                                                Assessment:       1. Ingrown nail    2. Ulcer of both feet, limited to breakdown of skin    3. Hammer toes of both feet          Plan:       Patient presents today complaining about painfully ingrown toenails on both feet she also has severe hammertoes her toes are rubbing together and causing irritation patient was treated for breast cancer in May of this year.  Patient also states she has some new calluses that have formed from some new shoes that were too tight and rubbing her toes.Patient's family member states that they had no idea that the patient's toenails were so long and so ingrown.   Patient had severely elongated ingrowing toenails on multiple digits bilateral while the patient did not need a nail avulsion I was able to aggressively trim and remove the ingrowing toenails which the patient tolerated well patient noted to me to be immediate relief upon removing the ingrown toenail this was especially noticeable on the bilateral hallux.  Patient does have irritation 5th digit left and needs to make sure she is wearing shoes that accommodate for the digital contracture this irritation could  certainly lead to breakdown ulceration which can lead to complication with the patient's peripheral vascular disease.  Patient had several new areas of pre ulcerative callus formation the most significant was overlying the lateral aspect of the 5th metatarsal phalangeal joint right I did debride this area there is a little bit of a sore underlying this area and I advised the patient this needs to be monitored closely but should resolve as long as no further irritation presents over the area patient also has hyperkeratotic lesion sub 5th metatarsal left all of this is related to the patient's new shoes that were too narrow rubbing and irritating these areas she states she is no longer wearing the shoes and now that she is not wearing the shoes these areas are not as tender as they had been each of these areas were non excisionally debrided.  Recommended follow-up will be as needed if the patient is not getting complete relief of the ingrowing toenails in the next 4-5 days to contact us for follow-up further evaluation treatment but I do recommend keeping the patient's skin well moisturized well hydrated on a daily basis.  Total time including discussion evaluation treatment discussion of treatment options treatment plan removal of ingrown toenails as well as comprehensive evaluation equaled 25 minutes.  The area of greatest concern was the medial border right hallux which was very deep and ingrown and just started to bother the patient about 7 days ago.  Patient has been having a lot of cramping in her legs and feet I advised her that I would recommend the use of a magnesium cream this is especially bothersome in the patient's right foot.  Patient advised the magnesium cream has been very helpful at a dressing cramping in the lower extremities when applied especially before bed.  Patient states she is willing to try anything at this point because the cramping has been very bothersome.  This note was created using  M*Modal voice recognition software that occasionally misinterpreted phrases or words.

## (undated) DEVICE — SPONGE LAP 18X18 PREWASHED

## (undated) DEVICE — DRESSING TRANS 4X4 TEGADERM

## (undated) DEVICE — SUT MONOCRYL 4-0 PS-2

## (undated) DEVICE — SUT SILK 3-0 SH 18IN BLACK

## (undated) DEVICE — NDL HYPO A BEVEL 22X1 1/2

## (undated) DEVICE — DEVICE CARESITE LUER ACCESS

## (undated) DEVICE — GLOVE SURG ULTRA TOUCH 7.5

## (undated) DEVICE — SLEEVE SCD EXPRESS KNEE MEDIUM

## (undated) DEVICE — SUT 3-0 VICRYL SH CR/8 18

## (undated) DEVICE — EVACUATOR WOUND BULB 100CC

## (undated) DEVICE — NEOGUARD COVER 4X30CM STERILE

## (undated) DEVICE — CLOSURE SKIN STERI STRIP 1/2X4

## (undated) DEVICE — SEE MEDLINE ITEM 157131

## (undated) DEVICE — PACK SET UP 190 OMC-NS

## (undated) DEVICE — DRAPE C ARM 42 X 120 10/BX

## (undated) DEVICE — SUT VICRYL 3-0 27 SH

## (undated) DEVICE — SOL 9P NACL IRR PIC IL

## (undated) DEVICE — GLOVE SURG BIOGEL LATEX SZ 7.5

## (undated) DEVICE — SUT 2-0 VICRYL / SH (J417)

## (undated) DEVICE — BLADE 4IN EDGE INSULATED

## (undated) DEVICE — DRAPE LAP TIBURON 77X122IN

## (undated) DEVICE — UNDERGLOVES BIOGEL PI SZ 7 LF

## (undated) DEVICE — GLOVE SURG ULTRA TOUCH 7

## (undated) DEVICE — GOWN POLY REINF SET SLV XL

## (undated) DEVICE — LINER SUCTION 3000CC

## (undated) DEVICE — APPLIER CLIP LIAGCLIP 9.375IN

## (undated) DEVICE — SUT 2/0 30IN SILK BLK BRAI

## (undated) DEVICE — SUT 2-0 ETHILON 18 FS

## (undated) DEVICE — PACK CUSTOM UNIV BASIN SLI

## (undated) DEVICE — GLOVE SENSICARE PI ALOE 7.5

## (undated) DEVICE — SUT ETHILON 2-0 FS 18IN BLK

## (undated) DEVICE — CONTAINER SPECIMEN OR STER 4OZ

## (undated) DEVICE — NDL SAFETY 21G X 1 1/2 ECLPSE

## (undated) DEVICE — TOWEL OR DISP STRL BLUE 4/PK

## (undated) DEVICE — SPONGE GAUZE 16PLY 4X4

## (undated) DEVICE — SUT 3-0 VICRYL / SH (J416)

## (undated) DEVICE — ELECTRODE REM PLYHSV RETURN 9

## (undated) DEVICE — SYR 10CC LUER LOCK

## (undated) DEVICE — Device

## (undated) DEVICE — PENCIL SMK EVAC CONNECTOR 10FT

## (undated) DEVICE — DRAPE T THYROID STERILE

## (undated) DEVICE — SEE MEDLINE ITEM 146292

## (undated) DEVICE — ELECTRODE BLADE INSULATED 1 IN

## (undated) DEVICE — DRAPE STERI INSTRUMENT 1018

## (undated) DEVICE — SYR MEDALLION WHITE 1ML

## (undated) DEVICE — DRAPE T TRNSVRS LAP 102X78X121

## (undated) DEVICE — SUT 2-0 12-18IN SILK

## (undated) DEVICE — STRAP OR TABLE 5IN X 72IN

## (undated) DEVICE — HAND CONTROL ELECTRODE PENCIL

## (undated) DEVICE — ELECTRODE BLD EXT INSUL 1

## (undated) DEVICE — GLOVE SENSICARE PI GRN 6.5

## (undated) DEVICE — PACK SIRUS BASIC V SURG STRL

## (undated) DEVICE — SPONGE IV DRAIN 4X4 STERILE

## (undated) DEVICE — DRAIN SINGLE ROUND 3/16 15F

## (undated) DEVICE — SUT MCRYL PLUS 4-0 PS2 27IN

## (undated) DEVICE — SPONGE BULKEE II ABSRB 6X6.75

## (undated) DEVICE — TAPE MEDIPORE 3 X 10YD

## (undated) DEVICE — DRAPE HALF SURGICAL 40X58IN

## (undated) DEVICE — SEE MEDLINE ITEM 157117

## (undated) DEVICE — STAPLER SKIN ROTATING HEAD

## (undated) DEVICE — GLOVE SENSICARE PI GRN 6

## (undated) DEVICE — ELECTRODE MEGADYNE RETURN DUAL

## (undated) DEVICE — APPLICATOR CHLORAPREP ORN 26ML

## (undated) DEVICE — SUT SILK 2.0 BLK 18

## (undated) DEVICE — PAD ABD 8X10 STERILE

## (undated) DEVICE — NDL HYPO REG 25G X 1 1/2

## (undated) DEVICE — BLADE ELECTRO EDGE INSULATED

## (undated) DEVICE — SPONGE DERMACEA 4X4IN 12PLY

## (undated) DEVICE — SUT CTD VICRYL 3-0 CR/SH

## (undated) DEVICE — GOWN X-LG STERILE BACK